# Patient Record
Sex: FEMALE | Race: WHITE | NOT HISPANIC OR LATINO | Employment: FULL TIME | URBAN - METROPOLITAN AREA
[De-identification: names, ages, dates, MRNs, and addresses within clinical notes are randomized per-mention and may not be internally consistent; named-entity substitution may affect disease eponyms.]

---

## 2017-03-20 DIAGNOSIS — R10.2 PELVIC AND PERINEAL PAIN: ICD-10-CM

## 2017-03-21 ENCOUNTER — APPOINTMENT (OUTPATIENT)
Dept: LAB | Facility: HOSPITAL | Age: 27
End: 2017-03-21
Payer: COMMERCIAL

## 2017-03-21 DIAGNOSIS — R10.2 PELVIC AND PERINEAL PAIN: ICD-10-CM

## 2017-03-21 LAB — B-HCG SERPL-ACNC: <2 MIU/ML

## 2017-03-21 PROCEDURE — 36415 COLL VENOUS BLD VENIPUNCTURE: CPT

## 2017-03-21 PROCEDURE — 84702 CHORIONIC GONADOTROPIN TEST: CPT

## 2017-03-25 ENCOUNTER — HOSPITAL ENCOUNTER (OUTPATIENT)
Dept: ULTRASOUND IMAGING | Facility: HOSPITAL | Age: 27
Discharge: HOME | End: 2017-03-25
Attending: FAMILY MEDICINE
Payer: COMMERCIAL

## 2017-03-25 DIAGNOSIS — R10.2 PELVIC AND PERINEAL PAIN: ICD-10-CM

## 2017-03-25 PROCEDURE — 76830 TRANSVAGINAL US NON-OB: CPT

## 2017-03-25 PROCEDURE — 76856 US EXAM PELVIC COMPLETE: CPT

## 2017-03-29 ENCOUNTER — ALLSCRIPTS OFFICE VISIT (OUTPATIENT)
Dept: OTHER | Facility: OTHER | Age: 27
End: 2017-03-29

## 2017-04-04 PROCEDURE — G0145 SCR C/V CYTO,THINLAYER,RESCR: HCPCS | Performed by: PHYSICIAN ASSISTANT

## 2017-04-06 ENCOUNTER — LAB REQUISITION (OUTPATIENT)
Dept: LAB | Facility: HOSPITAL | Age: 27
End: 2017-04-06
Payer: COMMERCIAL

## 2017-04-06 DIAGNOSIS — Z12.4 ENCOUNTER FOR SCREENING FOR MALIGNANT NEOPLASM OF CERVIX: ICD-10-CM

## 2017-04-11 LAB
LAB AP GYN PRIMARY INTERPRETATION: NORMAL
Lab: NORMAL

## 2017-04-28 ENCOUNTER — HOSPITAL ENCOUNTER (EMERGENCY)
Facility: HOSPITAL | Age: 27
Discharge: HOME | End: 2017-04-28
Attending: EMERGENCY MEDICINE
Payer: COMMERCIAL

## 2017-04-28 ENCOUNTER — APPOINTMENT (EMERGENCY)
Dept: RADIOLOGY | Facility: HOSPITAL | Age: 27
End: 2017-04-28
Payer: COMMERCIAL

## 2017-04-28 VITALS
HEART RATE: 84 BPM | OXYGEN SATURATION: 98 % | HEIGHT: 64 IN | BODY MASS INDEX: 23.94 KG/M2 | SYSTOLIC BLOOD PRESSURE: 126 MMHG | RESPIRATION RATE: 18 BRPM | TEMPERATURE: 98.6 F | WEIGHT: 140.21 LBS | DIASTOLIC BLOOD PRESSURE: 84 MMHG

## 2017-04-28 DIAGNOSIS — R10.33 COLICKY PERIUMBILICAL ABDOMINAL PAIN: Primary | ICD-10-CM

## 2017-04-28 DIAGNOSIS — R11.2 NAUSEA AND VOMITING: ICD-10-CM

## 2017-04-28 LAB
ALBUMIN SERPL BCP-MCNC: 3.7 G/DL (ref 3.5–5)
ALP SERPL-CCNC: 76 U/L (ref 46–116)
ALT SERPL W P-5'-P-CCNC: 34 U/L (ref 12–78)
ANION GAP SERPL CALCULATED.3IONS-SCNC: 7 MMOL/L (ref 4–13)
AST SERPL W P-5'-P-CCNC: 35 U/L (ref 5–45)
BACTERIA UR QL AUTO: ABNORMAL /HPF
BASOPHILS # BLD AUTO: 0.02 THOUSANDS/ΜL (ref 0–0.1)
BASOPHILS NFR BLD AUTO: 0 % (ref 0–1)
BILIRUB SERPL-MCNC: 0.4 MG/DL (ref 0.2–1)
BILIRUB UR QL STRIP: ABNORMAL
BUN SERPL-MCNC: 11 MG/DL (ref 5–25)
CALCIUM SERPL-MCNC: 8.9 MG/DL (ref 8.3–10.1)
CAOX CRY URNS QL MICRO: ABNORMAL /HPF
CHLORIDE SERPL-SCNC: 101 MMOL/L (ref 100–108)
CLARITY UR: ABNORMAL
CO2 SERPL-SCNC: 26 MMOL/L (ref 21–32)
COLOR UR: YELLOW
CREAT SERPL-MCNC: 0.84 MG/DL (ref 0.6–1.3)
EOSINOPHIL # BLD AUTO: 0.08 THOUSAND/ΜL (ref 0–0.61)
EOSINOPHIL NFR BLD AUTO: 1 % (ref 0–6)
ERYTHROCYTE [DISTWIDTH] IN BLOOD BY AUTOMATED COUNT: 12.7 % (ref 11.6–15.1)
GFR SERPL CREATININE-BSD FRML MDRD: >60 ML/MIN/1.73SQ M
GLUCOSE SERPL-MCNC: 109 MG/DL (ref 65–140)
GLUCOSE UR STRIP-MCNC: NEGATIVE MG/DL
HCG UR QL: NEGATIVE
HCT VFR BLD AUTO: 49 % (ref 34.8–46.1)
HGB BLD-MCNC: 16.3 G/DL (ref 11.5–15.4)
HGB UR QL STRIP.AUTO: NEGATIVE
KETONES UR STRIP-MCNC: ABNORMAL MG/DL
LEUKOCYTE ESTERASE UR QL STRIP: ABNORMAL
LIPASE SERPL-CCNC: 148 U/L (ref 73–393)
LYMPHOCYTES # BLD AUTO: 1.5 THOUSANDS/ΜL (ref 0.6–4.47)
LYMPHOCYTES NFR BLD AUTO: 26 % (ref 14–44)
MCH RBC QN AUTO: 31.1 PG (ref 26.8–34.3)
MCHC RBC AUTO-ENTMCNC: 33.3 G/DL (ref 31.4–37.4)
MCV RBC AUTO: 94 FL (ref 82–98)
MONOCYTES # BLD AUTO: 0.56 THOUSAND/ΜL (ref 0.17–1.22)
MONOCYTES NFR BLD AUTO: 10 % (ref 4–12)
MUCOUS THREADS UR QL AUTO: ABNORMAL
NEUTROPHILS # BLD AUTO: 3.54 THOUSANDS/ΜL (ref 1.85–7.62)
NEUTS SEG NFR BLD AUTO: 63 % (ref 43–75)
NITRITE UR QL STRIP: NEGATIVE
NON-SQ EPI CELLS URNS QL MICRO: ABNORMAL /HPF
PH UR STRIP.AUTO: 5.5 [PH] (ref 4.5–8)
PLATELET # BLD AUTO: 275 THOUSANDS/UL (ref 149–390)
PMV BLD AUTO: 10 FL (ref 8.9–12.7)
POTASSIUM SERPL-SCNC: 3.4 MMOL/L (ref 3.5–5.3)
PROT SERPL-MCNC: 7.6 G/DL (ref 6.4–8.2)
PROT UR STRIP-MCNC: ABNORMAL MG/DL
RBC # BLD AUTO: 5.24 MILLION/UL (ref 3.81–5.12)
RBC #/AREA URNS AUTO: ABNORMAL /HPF
SODIUM SERPL-SCNC: 134 MMOL/L (ref 136–145)
SP GR UR STRIP.AUTO: 1.02 (ref 1–1.03)
UROBILINOGEN UR QL STRIP.AUTO: 2 E.U./DL
WBC # BLD AUTO: 5.7 THOUSAND/UL (ref 4.31–10.16)
WBC #/AREA URNS AUTO: ABNORMAL /HPF

## 2017-04-28 PROCEDURE — 81025 URINE PREGNANCY TEST: CPT | Performed by: EMERGENCY MEDICINE

## 2017-04-28 PROCEDURE — 87147 CULTURE TYPE IMMUNOLOGIC: CPT | Performed by: EMERGENCY MEDICINE

## 2017-04-28 PROCEDURE — 36415 COLL VENOUS BLD VENIPUNCTURE: CPT | Performed by: EMERGENCY MEDICINE

## 2017-04-28 PROCEDURE — 96374 THER/PROPH/DIAG INJ IV PUSH: CPT

## 2017-04-28 PROCEDURE — 83690 ASSAY OF LIPASE: CPT | Performed by: EMERGENCY MEDICINE

## 2017-04-28 PROCEDURE — 74022 RADEX COMPL AQT ABD SERIES: CPT

## 2017-04-28 PROCEDURE — 80053 COMPREHEN METABOLIC PANEL: CPT | Performed by: EMERGENCY MEDICINE

## 2017-04-28 PROCEDURE — 96361 HYDRATE IV INFUSION ADD-ON: CPT

## 2017-04-28 PROCEDURE — 81001 URINALYSIS AUTO W/SCOPE: CPT | Performed by: EMERGENCY MEDICINE

## 2017-04-28 PROCEDURE — 87086 URINE CULTURE/COLONY COUNT: CPT | Performed by: EMERGENCY MEDICINE

## 2017-04-28 PROCEDURE — 99284 EMERGENCY DEPT VISIT MOD MDM: CPT

## 2017-04-28 PROCEDURE — 85025 COMPLETE CBC W/AUTO DIFF WBC: CPT | Performed by: EMERGENCY MEDICINE

## 2017-04-28 RX ORDER — TRAMADOL HYDROCHLORIDE 50 MG/1
50 TABLET ORAL EVERY 6 HOURS PRN
Qty: 30 TABLET | Refills: 0 | Status: SHIPPED | OUTPATIENT
Start: 2017-04-28 | End: 2017-05-08

## 2017-04-28 RX ORDER — MAGNESIUM HYDROXIDE/ALUMINUM HYDROXICE/SIMETHICONE 120; 1200; 1200 MG/30ML; MG/30ML; MG/30ML
15 SUSPENSION ORAL ONCE
Status: COMPLETED | OUTPATIENT
Start: 2017-04-28 | End: 2017-04-28

## 2017-04-28 RX ORDER — KETOROLAC TROMETHAMINE 30 MG/ML
30 INJECTION, SOLUTION INTRAMUSCULAR; INTRAVENOUS ONCE
Status: COMPLETED | OUTPATIENT
Start: 2017-04-28 | End: 2017-04-28

## 2017-04-28 RX ORDER — ONDANSETRON 4 MG/1
4 TABLET, FILM COATED ORAL EVERY 8 HOURS PRN
COMMUNITY
End: 2020-09-03

## 2017-04-28 RX ADMIN — LIDOCAINE HYDROCHLORIDE 10 ML: 20 SOLUTION ORAL; TOPICAL at 04:27

## 2017-04-28 RX ADMIN — KETOROLAC TROMETHAMINE 30 MG: 30 INJECTION, SOLUTION INTRAMUSCULAR at 04:04

## 2017-04-28 RX ADMIN — ALUMINUM HYDROXIDE, MAGNESIUM HYDROXIDE, AND SIMETHICONE 15 ML: 200; 200; 20 SUSPENSION ORAL at 04:27

## 2017-04-28 RX ADMIN — SODIUM CHLORIDE 1000 ML: 0.9 INJECTION, SOLUTION INTRAVENOUS at 04:50

## 2017-04-29 LAB
BACTERIA UR CULT: NORMAL
BACTERIA UR CULT: NORMAL

## 2017-06-21 ENCOUNTER — HOSPITAL ENCOUNTER (OUTPATIENT)
Dept: ULTRASOUND IMAGING | Facility: HOSPITAL | Age: 27
Discharge: HOME | End: 2017-06-21
Attending: FAMILY MEDICINE
Payer: COMMERCIAL

## 2017-06-21 DIAGNOSIS — M79.89 OTHER DISORDERS OF SOFT TISSUE: ICD-10-CM

## 2017-06-21 PROCEDURE — 93971 EXTREMITY STUDY: CPT

## 2017-07-27 ENCOUNTER — LAB REQUISITION (OUTPATIENT)
Dept: LAB | Facility: HOSPITAL | Age: 27
End: 2017-07-27
Payer: COMMERCIAL

## 2017-07-27 DIAGNOSIS — Z00.8 ENCOUNTER FOR OTHER GENERAL EXAMINATION: ICD-10-CM

## 2017-07-27 LAB
CHOLEST SERPL-MCNC: 184 MG/DL (ref 50–200)
EST. AVERAGE GLUCOSE BLD GHB EST-MCNC: 108 MG/DL
HBA1C MFR BLD: 5.4 % (ref 4.2–6.3)
HDLC SERPL-MCNC: 54 MG/DL (ref 40–60)
LDLC SERPL CALC-MCNC: 99 MG/DL (ref 0–100)
TRIGL SERPL-MCNC: 155 MG/DL

## 2017-07-27 PROCEDURE — 83036 HEMOGLOBIN GLYCOSYLATED A1C: CPT | Performed by: PREVENTIVE MEDICINE

## 2017-07-27 PROCEDURE — 80061 LIPID PANEL: CPT | Performed by: PREVENTIVE MEDICINE

## 2018-01-12 VITALS
HEART RATE: 74 BPM | HEIGHT: 64 IN | WEIGHT: 134 LBS | RESPIRATION RATE: 16 BRPM | BODY MASS INDEX: 22.88 KG/M2 | OXYGEN SATURATION: 98 % | TEMPERATURE: 96.9 F | DIASTOLIC BLOOD PRESSURE: 80 MMHG | SYSTOLIC BLOOD PRESSURE: 122 MMHG

## 2018-01-18 NOTE — PROGRESS NOTES
Chief Complaint  Pt needs vitals for employee health screening  Active Problems    1  Abscess (682 9) (L02 91)   2  Anxiety (300 00) (F41 9)   3  Cat allergies (477 8) (J30 81)   4  Depression (311) (F32 9)   5  Fatigue (780 79) (R53 83)   6  Hair loss (704 00) (L65 9)   7  Hematuria (599 70) (R31 9)   8  History of kidney stones (V13 01) (Z87 442)   9  Infection due to Lactococcus species (041 89) (B96 89)   10  Screening for condition (V82 9) (Z13 9)   11  Seasonal allergies (477 9) (J30 2)   12  Sinusitis (473 9) (J32 9)   13  Urine frequency (788 41) (R35 0)   14  URTI (acute upper respiratory infection) (465 9) (J06 9)    Current Meds   1  ALPRAZolam 0 5 MG Oral Tablet; TAKE 1 TABLET TWICE DAILY; Therapy: 70Apg9110 to (Evaluate:13Jmt0306); Last Rx:07Jun2016 Ordered   2  Amoxicillin-Pot Clavulanate 875-125 MG Oral Tablet; TAKE 1 TABLET EVERY 12   HOURS DAILY; Therapy: 84VJD4397 to ([facility])  Requested for: 01RNY4755; Last   Rx:89Prm2001 Ordered   3  Azithromycin 250 MG Oral Tablet; TAKE 2 TABLETS ON DAY 1 THEN TAKE 1 TABLET A   DAY FOR 4 DAYS; Therapy: 96IZW3889 to (Last Rx:31Mar2016)  Requested for: 21XGR3782 Ordered   4  Ciprofloxacin HCl - 500 MG Oral Tablet; TAKE 1 TABLET EVERY 12 HOURS DAILY; Therapy: 43Fol2369 to (Evaluate:34Nyx7714)  Requested for: 83Fya9975; Last   Rx:81Vtf8766 Ordered   5  Clindamycin HCl - 300 MG Oral Capsule; TAKE 1 CAPSULE EVERY 6 HOURS DAILY X   10 days; Therapy: 96HOG1996 to (Last CJ:93NEF0068)  Requested for: 34JKE6430 Ordered   6  Diazepam 5 MG Oral Tablet; Take 1 tablet daily; Therapy: 71Fvp4241 to (Evaluate:21Wju4610); Last Rx:41Mie1161 Ordered   7  Fluconazole 100 MG Oral Tablet; TAKE 1 TABLET BY MOUTH ONCE DAILY MAY   REPEAT IN 2 DAYS; Therapy: 28Lze9255 to (Evaluate:83Fvc1735)  Requested for: 12Guc1116; Last   Rx:94Yhe9018 Ordered   8  Fluticasone Propionate 50 MCG/ACT Nasal Suspension; USE 2 SPRAYS IN EACH   NOSTRIL ONCE DAILY;    Therapy: 09WXO2847 to ([facility])  Requested for: 99OPV9442; Last   Rx:10Nov2015 Ordered   9  MethylPREDNISolone (Simon) 4 MG TABS; TAKE AS DIRECTED; Therapy: 76XSQ7228 to (Last Rx:31Mar2016)  Requested for: 63OXG5999 Ordered   10  Ondansetron HCl - 4 MG Oral Tablet; take as directed; Therapy: 96PRS7842 to (Last Rx:07Jun2016)  Requested for: 26QCN7636 Ordered   11  Promethazine-Codeine 6 25-10 MG/5ML Oral Syrup; TAKE 5 ML EVERY 4 TO 6 HOURS    AS NEEDED FOR COUGH; Therapy: 34RVX0848 to (Evaluate:66Iwe2245); Last Rx:31Mar2016 Ordered   12  Proventil  (90 Base) MCG/ACT Inhalation Aerosol Solution; 2 PUFFS Q 4 HRS; Therapy: 06JQI5184 to (Last Rx:31Mar2016)  Requested for: 95ZFS3011 Ordered   13  Sertraline HCl - 25 MG Oral Tablet; Take 1 tablet daily; Therapy: 31Rcr3878 to (Evaluate:29Mtw1554)  Requested for: 47Zah7616; Last    Rx:04Apr2016 Ordered   14  Sertraline HCl - 50 MG Oral Tablet; TAKE 1 TABLET DAILY AS DIRECTED; Therapy: 85Ljj7977 to (Evaluate:45Xsm4712)  Requested for: 78Ngk2407; Last    Rx:04Apr2016 Ordered    Allergies    1  Bactrim TABS    Vitals  Signs    Systolic: 985  Diastolic: 78  Heart Rate: 75  Respiration: 16  O2 Saturation: 98  Height: 5 ft 4 in  Weight: 130 lb   BMI Calculated: 22 31  BSA Calculated: 1 63    Assessment    1  Encounter for preventive health examination (V70 0) (Z00 00)    Signatures   Electronically signed by : KATIA Corral;  Aug  8 2016  4:39PM EST                       (Author)    Electronically signed by : Ana Luisa Humphries MD; Sep 23 2016  9:15PM EST                       (Author)

## 2018-05-08 ENCOUNTER — LAB REQUISITION (OUTPATIENT)
Dept: LAB | Facility: HOSPITAL | Age: 28
End: 2018-05-08
Payer: COMMERCIAL

## 2018-05-08 DIAGNOSIS — Z12.4 ENCOUNTER FOR SCREENING FOR MALIGNANT NEOPLASM OF CERVIX: ICD-10-CM

## 2018-05-08 PROCEDURE — G0145 SCR C/V CYTO,THINLAYER,RESCR: HCPCS | Performed by: PHYSICIAN ASSISTANT

## 2018-05-11 LAB
LAB AP GYN PRIMARY INTERPRETATION: NORMAL
Lab: NORMAL

## 2018-06-12 DIAGNOSIS — J32.9 SINUSITIS, UNSPECIFIED CHRONICITY, UNSPECIFIED LOCATION: Primary | ICD-10-CM

## 2018-06-12 RX ORDER — AZITHROMYCIN 250 MG/1
TABLET, FILM COATED ORAL
Qty: 6 TABLET | Refills: 0 | Status: SHIPPED | OUTPATIENT
Start: 2018-06-12 | End: 2018-06-16

## 2018-08-18 ENCOUNTER — APPOINTMENT (OUTPATIENT)
Dept: LAB | Facility: HOSPITAL | Age: 28
End: 2018-08-18

## 2018-08-18 ENCOUNTER — TRANSCRIBE ORDERS (OUTPATIENT)
Dept: ADMINISTRATIVE | Facility: HOSPITAL | Age: 28
End: 2018-08-18

## 2018-08-18 DIAGNOSIS — Z00.8 HEALTH EXAMINATION IN POPULATION SURVEYS: Primary | ICD-10-CM

## 2018-08-18 DIAGNOSIS — Z00.8 HEALTH EXAMINATION IN POPULATION SURVEYS: ICD-10-CM

## 2018-08-18 LAB
CHOLEST SERPL-MCNC: 224 MG/DL (ref 50–200)
EST. AVERAGE GLUCOSE BLD GHB EST-MCNC: 111 MG/DL
HBA1C MFR BLD: 5.5 % (ref 4.2–6.3)
HDLC SERPL-MCNC: 63 MG/DL (ref 40–60)
LDLC SERPL CALC-MCNC: 135 MG/DL (ref 0–100)
NONHDLC SERPL-MCNC: 161 MG/DL
TRIGL SERPL-MCNC: 131 MG/DL

## 2018-08-18 PROCEDURE — 36415 COLL VENOUS BLD VENIPUNCTURE: CPT

## 2018-08-18 PROCEDURE — 83036 HEMOGLOBIN GLYCOSYLATED A1C: CPT

## 2018-08-18 PROCEDURE — 80061 LIPID PANEL: CPT

## 2019-06-27 ENCOUNTER — APPOINTMENT (OUTPATIENT)
Dept: LAB | Facility: HOSPITAL | Age: 29
End: 2019-06-27
Attending: FAMILY MEDICINE
Payer: COMMERCIAL

## 2019-06-27 ENCOUNTER — OFFICE VISIT (OUTPATIENT)
Dept: FAMILY MEDICINE CLINIC | Facility: CLINIC | Age: 29
End: 2019-06-27
Payer: COMMERCIAL

## 2019-06-27 ENCOUNTER — HOSPITAL ENCOUNTER (OUTPATIENT)
Dept: CT IMAGING | Facility: HOSPITAL | Age: 29
Discharge: HOME | End: 2019-06-27
Attending: FAMILY MEDICINE
Payer: COMMERCIAL

## 2019-06-27 VITALS
BODY MASS INDEX: 23.97 KG/M2 | RESPIRATION RATE: 14 BRPM | OXYGEN SATURATION: 98 % | DIASTOLIC BLOOD PRESSURE: 64 MMHG | HEART RATE: 95 BPM | SYSTOLIC BLOOD PRESSURE: 114 MMHG | HEIGHT: 64 IN | WEIGHT: 140.4 LBS

## 2019-06-27 DIAGNOSIS — R59.1 LAD (LYMPHADENOPATHY): ICD-10-CM

## 2019-06-27 DIAGNOSIS — R59.1 LAD (LYMPHADENOPATHY): Primary | ICD-10-CM

## 2019-06-27 LAB
ALBUMIN SERPL BCP-MCNC: 3.9 G/DL (ref 3.5–5)
ALP SERPL-CCNC: 103 U/L (ref 46–116)
ALT SERPL W P-5'-P-CCNC: 38 U/L (ref 12–78)
ANION GAP SERPL CALCULATED.3IONS-SCNC: 7 MMOL/L (ref 4–13)
AST SERPL W P-5'-P-CCNC: 20 U/L (ref 5–45)
BASOPHILS # BLD AUTO: 0.03 THOUSANDS/ΜL (ref 0–0.1)
BASOPHILS NFR BLD AUTO: 1 % (ref 0–1)
BILIRUB SERPL-MCNC: 0.4 MG/DL (ref 0.2–1)
BUN SERPL-MCNC: 10 MG/DL (ref 5–25)
CALCIUM SERPL-MCNC: 8.7 MG/DL (ref 8.3–10.1)
CHLORIDE SERPL-SCNC: 105 MMOL/L (ref 100–108)
CO2 SERPL-SCNC: 26 MMOL/L (ref 21–32)
CREAT SERPL-MCNC: 0.72 MG/DL (ref 0.6–1.3)
EOSINOPHIL # BLD AUTO: 0.06 THOUSAND/ΜL (ref 0–0.61)
EOSINOPHIL NFR BLD AUTO: 1 % (ref 0–6)
ERYTHROCYTE [DISTWIDTH] IN BLOOD BY AUTOMATED COUNT: 12 % (ref 11.6–15.1)
GFR SERPL CREATININE-BSD FRML MDRD: 114 ML/MIN/1.73SQ M
GLUCOSE P FAST SERPL-MCNC: 92 MG/DL (ref 65–99)
HCT VFR BLD AUTO: 42.5 % (ref 34.8–46.1)
HGB BLD-MCNC: 13.9 G/DL (ref 11.5–15.4)
IMM GRANULOCYTES # BLD AUTO: 0.07 THOUSAND/UL (ref 0–0.2)
IMM GRANULOCYTES NFR BLD AUTO: 1 % (ref 0–2)
LDH SERPL-CCNC: 171 U/L (ref 81–234)
LYMPHOCYTES # BLD AUTO: 1.13 THOUSANDS/ΜL (ref 0.6–4.47)
LYMPHOCYTES NFR BLD AUTO: 20 % (ref 14–44)
MCH RBC QN AUTO: 31 PG (ref 26.8–34.3)
MCHC RBC AUTO-ENTMCNC: 32.7 G/DL (ref 31.4–37.4)
MCV RBC AUTO: 95 FL (ref 82–98)
MONOCYTES # BLD AUTO: 0.36 THOUSAND/ΜL (ref 0.17–1.22)
MONOCYTES NFR BLD AUTO: 6 % (ref 4–12)
NEUTROPHILS # BLD AUTO: 4.05 THOUSANDS/ΜL (ref 1.85–7.62)
NEUTS SEG NFR BLD AUTO: 71 % (ref 43–75)
NRBC BLD AUTO-RTO: 0 /100 WBCS
PLATELET # BLD AUTO: 266 THOUSANDS/UL (ref 149–390)
PMV BLD AUTO: 9.3 FL (ref 8.9–12.7)
POTASSIUM SERPL-SCNC: 4.1 MMOL/L (ref 3.5–5.3)
PROT SERPL-MCNC: 7.4 G/DL (ref 6.4–8.2)
RBC # BLD AUTO: 4.48 MILLION/UL (ref 3.81–5.12)
SODIUM SERPL-SCNC: 138 MMOL/L (ref 136–145)
TSH SERPL DL<=0.05 MIU/L-ACNC: 1.56 UIU/ML (ref 0.36–3.74)
WBC # BLD AUTO: 5.7 THOUSAND/UL (ref 4.31–10.16)

## 2019-06-27 PROCEDURE — 88184 FLOWCYTOMETRY/ TC 1 MARKER: CPT

## 2019-06-27 PROCEDURE — 83615 LACTATE (LD) (LDH) ENZYME: CPT

## 2019-06-27 PROCEDURE — 86611 BARTONELLA ANTIBODY: CPT

## 2019-06-27 PROCEDURE — 3008F BODY MASS INDEX DOCD: CPT | Performed by: FAMILY MEDICINE

## 2019-06-27 PROCEDURE — 85025 COMPLETE CBC W/AUTO DIFF WBC: CPT

## 2019-06-27 PROCEDURE — 84443 ASSAY THYROID STIM HORMONE: CPT

## 2019-06-27 PROCEDURE — 88185 FLOWCYTOMETRY/TC ADD-ON: CPT

## 2019-06-27 PROCEDURE — 80053 COMPREHEN METABOLIC PANEL: CPT

## 2019-06-27 PROCEDURE — 1036F TOBACCO NON-USER: CPT | Performed by: FAMILY MEDICINE

## 2019-06-27 PROCEDURE — 99213 OFFICE O/P EST LOW 20 MIN: CPT | Performed by: FAMILY MEDICINE

## 2019-06-27 PROCEDURE — 36415 COLL VENOUS BLD VENIPUNCTURE: CPT

## 2019-06-27 PROCEDURE — 70491 CT SOFT TISSUE NECK W/DYE: CPT

## 2019-06-27 RX ORDER — AZITHROMYCIN 250 MG/1
500 TABLET, FILM COATED ORAL EVERY 24 HOURS
Qty: 10 TABLET | Refills: 0 | Status: SHIPPED | OUTPATIENT
Start: 2019-06-27 | End: 2019-07-02

## 2019-06-27 RX ADMIN — IOHEXOL 85 ML: 350 INJECTION, SOLUTION INTRAVENOUS at 17:33

## 2019-07-01 LAB
B HENSELAE IGG TITR SER IF: NEGATIVE TITER
B HENSELAE IGM TITR SER IF: NEGATIVE TITER
B QUINTANA IGG TITR SER IF: NEGATIVE TITER
B QUINTANA IGM TITR SER IF: NEGATIVE TITER

## 2019-07-11 LAB — SCAN RESULT: NORMAL

## 2019-09-05 ENCOUNTER — LAB REQUISITION (OUTPATIENT)
Dept: LAB | Facility: HOSPITAL | Age: 29
End: 2019-09-05
Payer: COMMERCIAL

## 2019-09-05 DIAGNOSIS — Z12.4 ENCOUNTER FOR SCREENING FOR MALIGNANT NEOPLASM OF CERVIX: ICD-10-CM

## 2019-09-05 PROCEDURE — G0145 SCR C/V CYTO,THINLAYER,RESCR: HCPCS | Performed by: PHYSICIAN ASSISTANT

## 2019-09-10 LAB
LAB AP GYN PRIMARY INTERPRETATION: NORMAL
Lab: NORMAL

## 2019-12-10 DIAGNOSIS — N97.9 INFERTILITY, FEMALE: Primary | ICD-10-CM

## 2019-12-17 ENCOUNTER — TRANSCRIBE ORDERS (OUTPATIENT)
Dept: ADMINISTRATIVE | Facility: HOSPITAL | Age: 29
End: 2019-12-17

## 2019-12-17 ENCOUNTER — APPOINTMENT (OUTPATIENT)
Dept: LAB | Facility: HOSPITAL | Age: 29
End: 2019-12-17
Payer: COMMERCIAL

## 2019-12-17 DIAGNOSIS — D64.9 ANEMIA, UNSPECIFIED TYPE: ICD-10-CM

## 2019-12-17 DIAGNOSIS — Z31.41 FERTILITY TESTING: ICD-10-CM

## 2019-12-17 DIAGNOSIS — E66.8 OBESITY OF ENDOCRINE ORIGIN: ICD-10-CM

## 2019-12-17 DIAGNOSIS — R76.0 RAISED ANTIBODY TITER: ICD-10-CM

## 2019-12-17 DIAGNOSIS — Z22.4 CARRIER OR SUSPECTED CARRIER OF GONORRHEA: ICD-10-CM

## 2019-12-17 DIAGNOSIS — Z11.8 SCREENING EXAMINATION FOR TRACHOMA: ICD-10-CM

## 2019-12-17 DIAGNOSIS — E07.9 DISEASE OF THYROID GLAND: ICD-10-CM

## 2019-12-17 DIAGNOSIS — Z11.3 SCREENING EXAMINATION FOR VENEREAL DISEASE: ICD-10-CM

## 2019-12-17 DIAGNOSIS — Z01.83 ENCOUNTER FOR BLOOD TYPING: Primary | ICD-10-CM

## 2019-12-17 DIAGNOSIS — Z01.83 ENCOUNTER FOR BLOOD TYPING: ICD-10-CM

## 2019-12-17 LAB
ABO GROUP BLD: NORMAL
BASOPHILS # BLD AUTO: 0.06 THOUSANDS/ΜL (ref 0–0.1)
BASOPHILS NFR BLD AUTO: 1 % (ref 0–1)
BLD GP AB SCN SERPL QL: NEGATIVE
EOSINOPHIL # BLD AUTO: 0.13 THOUSAND/ΜL (ref 0–0.61)
EOSINOPHIL NFR BLD AUTO: 2 % (ref 0–6)
ERYTHROCYTE [DISTWIDTH] IN BLOOD BY AUTOMATED COUNT: 12.3 % (ref 11.6–15.1)
HBV CORE IGM SER QL: NORMAL
HBV SURFACE AG SER QL: NORMAL
HCT VFR BLD AUTO: 44.1 % (ref 34.8–46.1)
HCV AB SER QL: NORMAL
HGB BLD-MCNC: 14.5 G/DL (ref 11.5–15.4)
IMM GRANULOCYTES # BLD AUTO: 0.02 THOUSAND/UL (ref 0–0.2)
IMM GRANULOCYTES NFR BLD AUTO: 0 % (ref 0–2)
LYMPHOCYTES # BLD AUTO: 2.43 THOUSANDS/ΜL (ref 0.6–4.47)
LYMPHOCYTES NFR BLD AUTO: 39 % (ref 14–44)
MCH RBC QN AUTO: 31.3 PG (ref 26.8–34.3)
MCHC RBC AUTO-ENTMCNC: 32.9 G/DL (ref 31.4–37.4)
MCV RBC AUTO: 95 FL (ref 82–98)
MONOCYTES # BLD AUTO: 0.39 THOUSAND/ΜL (ref 0.17–1.22)
MONOCYTES NFR BLD AUTO: 6 % (ref 4–12)
NEUTROPHILS # BLD AUTO: 3.16 THOUSANDS/ΜL (ref 1.85–7.62)
NEUTS SEG NFR BLD AUTO: 52 % (ref 43–75)
NRBC BLD AUTO-RTO: 0 /100 WBCS
PLATELET # BLD AUTO: 307 THOUSANDS/UL (ref 149–390)
PMV BLD AUTO: 9.2 FL (ref 8.9–12.7)
PROLACTIN SERPL-MCNC: 10.2 NG/ML
RBC # BLD AUTO: 4.63 MILLION/UL (ref 3.81–5.12)
RH BLD: POSITIVE
RUBV IGG SERPL IA-ACNC: >175 IU/ML
SPECIMEN EXPIRATION DATE: NORMAL
T3FREE SERPL-MCNC: 3.43 PG/ML (ref 2.3–4.2)
T4 FREE SERPL-MCNC: 1.01 NG/DL (ref 0.76–1.46)
TSH SERPL DL<=0.05 MIU/L-ACNC: 1.74 UIU/ML (ref 0.36–3.74)
WBC # BLD AUTO: 6.19 THOUSAND/UL (ref 4.31–10.16)

## 2019-12-17 PROCEDURE — 86645 CMV ANTIBODY IGM: CPT

## 2019-12-17 PROCEDURE — 84481 FREE ASSAY (FT-3): CPT

## 2019-12-17 PROCEDURE — 86787 VARICELLA-ZOSTER ANTIBODY: CPT

## 2019-12-17 PROCEDURE — 80081 OBSTETRIC PANEL INC HIV TSTG: CPT

## 2019-12-17 PROCEDURE — 83516 IMMUNOASSAY NONANTIBODY: CPT

## 2019-12-17 PROCEDURE — 86705 HEP B CORE ANTIBODY IGM: CPT

## 2019-12-17 PROCEDURE — 36415 COLL VENOUS BLD VENIPUNCTURE: CPT

## 2019-12-17 PROCEDURE — 84443 ASSAY THYROID STIM HORMONE: CPT

## 2019-12-17 PROCEDURE — 84439 ASSAY OF FREE THYROXINE: CPT

## 2019-12-17 PROCEDURE — 87491 CHLMYD TRACH DNA AMP PROBE: CPT

## 2019-12-17 PROCEDURE — 86790 VIRUS ANTIBODY NOS: CPT

## 2019-12-17 PROCEDURE — 87661 TRICHOMONAS VAGINALIS AMPLIF: CPT

## 2019-12-17 PROCEDURE — 87591 N.GONORRHOEAE DNA AMP PROB: CPT

## 2019-12-17 PROCEDURE — 86632 CHLAMYDIA IGM ANTIBODY: CPT

## 2019-12-17 PROCEDURE — 86644 CMV ANTIBODY: CPT

## 2019-12-17 PROCEDURE — 86803 HEPATITIS C AB TEST: CPT

## 2019-12-17 PROCEDURE — 84146 ASSAY OF PROLACTIN: CPT

## 2019-12-18 LAB
C TRACH DNA SPEC QL NAA+PROBE: NEGATIVE
CMV IGG SERPL IA-ACNC: <0.6 U/ML (ref 0–0.59)
CMV IGM SERPL IA-ACNC: <30 AU/ML (ref 0–29.9)
HIV 1+2 AB+HIV1 P24 AG SERPL QL IA: NORMAL
N GONORRHOEA DNA SPEC QL NAA+PROBE: NEGATIVE
RPR SER QL: NORMAL

## 2019-12-19 LAB
C TRACH IGM TITR SER IF: <0.8 INDEX (ref 0–0.7)
HTLV I+II AB SER QL IA: NEGATIVE
VZV IGG SER IA-ACNC: NORMAL

## 2019-12-20 LAB — T VAGINALIS RRNA SPEC QL NAA+PROBE: NEGATIVE

## 2019-12-21 LAB — MIS SERPL-MCNC: 1.29 NG/ML

## 2020-01-08 ENCOUNTER — APPOINTMENT (OUTPATIENT)
Dept: LAB | Facility: HOSPITAL | Age: 30
End: 2020-01-08
Payer: COMMERCIAL

## 2020-01-08 ENCOUNTER — TRANSCRIBE ORDERS (OUTPATIENT)
Dept: ADMINISTRATIVE | Facility: HOSPITAL | Age: 30
End: 2020-01-08

## 2020-01-08 DIAGNOSIS — Z11.59 SPECIAL SCREENING EXAMINATION FOR VIRAL AND CHLAMYDIAL DISEASE: Primary | ICD-10-CM

## 2020-01-08 DIAGNOSIS — Z11.59 SCREENING FOR VIRAL AND CHLAMYDIAL DISEASES: ICD-10-CM

## 2020-01-08 DIAGNOSIS — Z11.8 SPECIAL SCREENING EXAMINATION FOR VIRAL AND CHLAMYDIAL DISEASE: ICD-10-CM

## 2020-01-08 DIAGNOSIS — Z11.8 SPECIAL SCREENING EXAMINATION FOR VIRAL AND CHLAMYDIAL DISEASE: Primary | ICD-10-CM

## 2020-01-08 DIAGNOSIS — Z11.8 SCREENING FOR VIRAL AND CHLAMYDIAL DISEASES: ICD-10-CM

## 2020-01-08 DIAGNOSIS — Z11.59 SPECIAL SCREENING EXAMINATION FOR VIRAL AND CHLAMYDIAL DISEASE: ICD-10-CM

## 2020-01-08 LAB — HBV CORE AB SER QL: NORMAL

## 2020-01-08 PROCEDURE — 86704 HEP B CORE ANTIBODY TOTAL: CPT

## 2020-01-08 PROCEDURE — 86631 CHLAMYDIA ANTIBODY: CPT

## 2020-01-08 PROCEDURE — 36415 COLL VENOUS BLD VENIPUNCTURE: CPT

## 2020-01-08 PROCEDURE — 86632 CHLAMYDIA IGM ANTIBODY: CPT

## 2020-01-10 LAB
C PNEUM IGA TITR SER IF: NORMAL {TITER}
C PNEUM IGA+IGG+IGM SER-IMP: NORMAL
C PNEUM IGG TITR SER IF: NORMAL {TITER}
C PNEUM IGM TITR SER IF: NORMAL {TITER}

## 2020-01-21 PROCEDURE — 88305 TISSUE EXAM BY PATHOLOGIST: CPT | Performed by: PATHOLOGY

## 2020-01-21 PROCEDURE — 88342 IMHCHEM/IMCYTCHM 1ST ANTB: CPT | Performed by: PATHOLOGY

## 2020-01-21 PROCEDURE — 86632 CHLAMYDIA IGM ANTIBODY: CPT | Performed by: OBSTETRICS & GYNECOLOGY

## 2020-01-21 PROCEDURE — 86631 CHLAMYDIA ANTIBODY: CPT | Performed by: OBSTETRICS & GYNECOLOGY

## 2020-01-22 ENCOUNTER — LAB REQUISITION (OUTPATIENT)
Dept: LAB | Facility: HOSPITAL | Age: 30
End: 2020-01-22
Payer: COMMERCIAL

## 2020-01-22 DIAGNOSIS — Z11.8 ENCOUNTER FOR SCREENING FOR OTHER INFECTIOUS AND PARASITIC DISEASES: ICD-10-CM

## 2020-01-22 DIAGNOSIS — N71.1 CHRONIC INFLAMMATORY DISEASE OF UTERUS: ICD-10-CM

## 2020-01-24 LAB
C TRACH IGM TITR SER IF: <0.8 INDEX (ref 0–0.7)
CHLAMYDIA IGG SER-ACNC: <0.91 RATIO (ref 0–0.9)

## 2020-02-12 ENCOUNTER — TRANSCRIBE ORDERS (OUTPATIENT)
Dept: LAB | Facility: HOSPITAL | Age: 30
End: 2020-02-12

## 2020-09-03 ENCOUNTER — TELEMEDICINE (OUTPATIENT)
Dept: FAMILY MEDICINE CLINIC | Facility: CLINIC | Age: 30
End: 2020-09-03
Payer: COMMERCIAL

## 2020-09-03 DIAGNOSIS — F41.9 ANXIETY: Primary | ICD-10-CM

## 2020-09-03 PROCEDURE — 99442 PR PHYS/QHP TELEPHONE EVALUATION 11-20 MIN: CPT | Performed by: FAMILY MEDICINE

## 2020-09-03 RX ORDER — BUSPIRONE HYDROCHLORIDE 5 MG/1
5 TABLET ORAL 2 TIMES DAILY
Qty: 60 TABLET | Refills: 5 | Status: SHIPPED | OUTPATIENT
Start: 2020-09-03 | End: 2021-05-18

## 2020-09-03 NOTE — PROGRESS NOTES
It was my intent to perform this visit via video technology but the patient was not able to do a video connection so the visit was completed via audio telephone only  Virtual Brief Visit    Assessment/Plan:    Problem List Items Addressed This Visit        Other    Anxiety - Primary    Relevant Medications    busPIRone (BUSPAR) 5 mg tablet                Reason for visit is No chief complaint on file  Encounter provider Anirudh Walker MD    Provider located at [address] [place] [address]    Recent Visits  No visits were found meeting these conditions  Showing recent visits within past 7 days and meeting all other requirements     Today's Visits  Date Type Provider Dept   09/03/20 Telemedicine MD Rell Orozco   Showing today's visits and meeting all other requirements     Future Appointments  No visits were found meeting these conditions  Showing future appointments within next 150 days and meeting all other requirements        After connecting through telephone, the patient was identified by name and date of birth  Momo Walker was informed that this is a telemedicine visit and that the visit is being conducted through telephone  My office door was closed  No one else was in the room  She acknowledged consent and understanding of privacy and security of the platform  The patient has agreed to participate and understands she can discontinue the visit at any time  Patient is aware this is a billable service  Subjective    Momo Walker is a 35 y o  female   The patient is experiencing significant anxiety and depression  She is having a difficult time functioning at work due to the inability to concentrate secondary to her symptoms  She has no homicidal or suicidal ideation  She would like to avoid sedating medications such as benzodiazepines  There is significant psychosocial factors contributing to her symptoms    These are situational but significant  I do not expect them to last much greater than 6 months, however  In the meantime, she would benefit from pharmacotherapy and some time away from work  No past medical history on file  Past Surgical History:   Procedure Laterality Date    KIDNEY STONE SURGERY      TONSILLECTOMY      WISDOM TOOTH EXTRACTION         Current Outpatient Medications   Medication Sig Dispense Refill    busPIRone (BUSPAR) 5 mg tablet Take 1 tablet (5 mg total) by mouth 2 (two) times a day 60 tablet 5     No current facility-administered medications for this visit  Allergies   Allergen Reactions    Sulfamethoxazole-Trimethoprim        Review of Systems   Psychiatric/Behavioral: The patient is nervous/anxious  There were no vitals filed for this visit  I spent 15 minutes directly with the patient during this visit    VIRTUAL VISIT DISCLAIMER    Melinda Cabrera acknowledges that she has consented to an online visit or consultation  She understands that the online visit is based solely on information provided by her, and that, in the absence of a face-to-face physical evaluation by the physician, the diagnosis she receives is both limited and provisional in terms of accuracy and completeness  This is not intended to replace a full medical face-to-face evaluation by the physician  Melinda Cabrera understands and accepts these terms

## 2020-09-24 ENCOUNTER — APPOINTMENT (OUTPATIENT)
Dept: LAB | Facility: HOSPITAL | Age: 30
End: 2020-09-24
Payer: COMMERCIAL

## 2020-09-24 ENCOUNTER — TRANSCRIBE ORDERS (OUTPATIENT)
Dept: ADMINISTRATIVE | Facility: HOSPITAL | Age: 30
End: 2020-09-24

## 2020-09-24 DIAGNOSIS — Z12.4 SCREENING FOR MALIGNANT NEOPLASM OF THE CERVIX: Primary | ICD-10-CM

## 2020-09-24 DIAGNOSIS — Z12.4 SCREENING FOR MALIGNANT NEOPLASM OF THE CERVIX: ICD-10-CM

## 2020-09-24 PROCEDURE — G0145 SCR C/V CYTO,THINLAYER,RESCR: HCPCS

## 2020-09-30 LAB
LAB AP GYN PRIMARY INTERPRETATION: NORMAL
Lab: NORMAL

## 2020-11-16 ENCOUNTER — OFFICE VISIT (OUTPATIENT)
Dept: FAMILY MEDICINE CLINIC | Facility: CLINIC | Age: 30
End: 2020-11-16
Payer: COMMERCIAL

## 2020-11-16 DIAGNOSIS — F41.9 ANXIETY: Primary | ICD-10-CM

## 2020-11-16 PROCEDURE — 99212 OFFICE O/P EST SF 10 MIN: CPT | Performed by: FAMILY MEDICINE

## 2020-11-30 ENCOUNTER — TELEPHONE (OUTPATIENT)
Dept: FAMILY MEDICINE CLINIC | Facility: CLINIC | Age: 30
End: 2020-11-30

## 2020-12-31 ENCOUNTER — LAB REQUISITION (OUTPATIENT)
Dept: LAB | Facility: HOSPITAL | Age: 30
End: 2020-12-31
Payer: COMMERCIAL

## 2020-12-31 DIAGNOSIS — Z31.41 ENCOUNTER FOR FERTILITY TESTING: ICD-10-CM

## 2020-12-31 LAB
BASOPHILS # BLD AUTO: 0.04 THOUSANDS/ΜL (ref 0–0.1)
BASOPHILS NFR BLD AUTO: 1 % (ref 0–1)
EOSINOPHIL # BLD AUTO: 0.09 THOUSAND/ΜL (ref 0–0.61)
EOSINOPHIL NFR BLD AUTO: 2 % (ref 0–6)
ERYTHROCYTE [DISTWIDTH] IN BLOOD BY AUTOMATED COUNT: 12.6 % (ref 11.6–15.1)
HCT VFR BLD AUTO: 46.1 % (ref 34.8–46.1)
HGB BLD-MCNC: 14.7 G/DL (ref 11.5–15.4)
IMM GRANULOCYTES # BLD AUTO: 0.01 THOUSAND/UL (ref 0–0.2)
IMM GRANULOCYTES NFR BLD AUTO: 0 % (ref 0–2)
LYMPHOCYTES # BLD AUTO: 2.1 THOUSANDS/ΜL (ref 0.6–4.47)
LYMPHOCYTES NFR BLD AUTO: 35 % (ref 14–44)
MCH RBC QN AUTO: 30.7 PG (ref 26.8–34.3)
MCHC RBC AUTO-ENTMCNC: 31.9 G/DL (ref 31.4–37.4)
MCV RBC AUTO: 96 FL (ref 82–98)
MONOCYTES # BLD AUTO: 0.36 THOUSAND/ΜL (ref 0.17–1.22)
MONOCYTES NFR BLD AUTO: 6 % (ref 4–12)
NEUTROPHILS # BLD AUTO: 3.39 THOUSANDS/ΜL (ref 1.85–7.62)
NEUTS SEG NFR BLD AUTO: 56 % (ref 43–75)
NRBC BLD AUTO-RTO: 0 /100 WBCS
PLATELET # BLD AUTO: 277 THOUSANDS/UL (ref 149–390)
PMV BLD AUTO: 9.8 FL (ref 8.9–12.7)
RBC # BLD AUTO: 4.79 MILLION/UL (ref 3.81–5.12)
WBC # BLD AUTO: 5.99 THOUSAND/UL (ref 4.31–10.16)

## 2020-12-31 PROCEDURE — 85025 COMPLETE CBC W/AUTO DIFF WBC: CPT | Performed by: OBSTETRICS & GYNECOLOGY

## 2020-12-31 PROCEDURE — 83516 IMMUNOASSAY NONANTIBODY: CPT | Performed by: OBSTETRICS & GYNECOLOGY

## 2021-01-06 LAB — MIS SERPL-MCNC: 1.52 NG/ML

## 2021-01-16 DIAGNOSIS — R50.9 FEVER AND CHILLS: Primary | ICD-10-CM

## 2021-01-18 DIAGNOSIS — R50.9 FEVER AND CHILLS: ICD-10-CM

## 2021-01-18 PROCEDURE — U0003 INFECTIOUS AGENT DETECTION BY NUCLEIC ACID (DNA OR RNA); SEVERE ACUTE RESPIRATORY SYNDROME CORONAVIRUS 2 (SARS-COV-2) (CORONAVIRUS DISEASE [COVID-19]), AMPLIFIED PROBE TECHNIQUE, MAKING USE OF HIGH THROUGHPUT TECHNOLOGIES AS DESCRIBED BY CMS-2020-01-R: HCPCS

## 2021-01-18 PROCEDURE — U0005 INFEC AGEN DETEC AMPLI PROBE: HCPCS

## 2021-01-19 LAB — SARS-COV-2 RNA RESP QL NAA+PROBE: NEGATIVE

## 2021-03-02 ENCOUNTER — LAB REQUISITION (OUTPATIENT)
Dept: LAB | Facility: HOSPITAL | Age: 31
End: 2021-03-02
Payer: COMMERCIAL

## 2021-03-02 DIAGNOSIS — Z13.228 ENCOUNTER FOR SCREENING FOR OTHER METABOLIC DISORDERS: ICD-10-CM

## 2021-03-02 LAB
BASOPHILS # BLD AUTO: 0.05 THOUSANDS/ΜL (ref 0–0.1)
BASOPHILS NFR BLD AUTO: 1 % (ref 0–1)
EOSINOPHIL # BLD AUTO: 0.07 THOUSAND/ΜL (ref 0–0.61)
EOSINOPHIL NFR BLD AUTO: 1 % (ref 0–6)
ERYTHROCYTE [DISTWIDTH] IN BLOOD BY AUTOMATED COUNT: 12.2 % (ref 11.6–15.1)
HCT VFR BLD AUTO: 45.1 % (ref 34.8–46.1)
HGB BLD-MCNC: 14.7 G/DL (ref 11.5–15.4)
IMM GRANULOCYTES # BLD AUTO: 0.02 THOUSAND/UL (ref 0–0.2)
IMM GRANULOCYTES NFR BLD AUTO: 0 % (ref 0–2)
LYMPHOCYTES # BLD AUTO: 2 THOUSANDS/ΜL (ref 0.6–4.47)
LYMPHOCYTES NFR BLD AUTO: 34 % (ref 14–44)
MCH RBC QN AUTO: 31.1 PG (ref 26.8–34.3)
MCHC RBC AUTO-ENTMCNC: 32.6 G/DL (ref 31.4–37.4)
MCV RBC AUTO: 96 FL (ref 82–98)
MONOCYTES # BLD AUTO: 0.43 THOUSAND/ΜL (ref 0.17–1.22)
MONOCYTES NFR BLD AUTO: 7 % (ref 4–12)
NEUTROPHILS # BLD AUTO: 3.37 THOUSANDS/ΜL (ref 1.85–7.62)
NEUTS SEG NFR BLD AUTO: 57 % (ref 43–75)
NRBC BLD AUTO-RTO: 0 /100 WBCS
PLATELET # BLD AUTO: 301 THOUSANDS/UL (ref 149–390)
PMV BLD AUTO: 10.3 FL (ref 8.9–12.7)
RBC # BLD AUTO: 4.72 MILLION/UL (ref 3.81–5.12)
WBC # BLD AUTO: 5.94 THOUSAND/UL (ref 4.31–10.16)

## 2021-03-02 PROCEDURE — 85025 COMPLETE CBC W/AUTO DIFF WBC: CPT | Performed by: OBSTETRICS & GYNECOLOGY

## 2021-04-27 ENCOUNTER — LAB REQUISITION (OUTPATIENT)
Dept: LAB | Facility: HOSPITAL | Age: 31
End: 2021-04-27
Payer: COMMERCIAL

## 2021-04-27 DIAGNOSIS — Z13.228 ENCOUNTER FOR SCREENING FOR OTHER METABOLIC DISORDERS: ICD-10-CM

## 2021-04-27 DIAGNOSIS — E28.9 OVARIAN DYSFUNCTION, UNSPECIFIED: ICD-10-CM

## 2021-04-27 LAB
BASOPHILS # BLD AUTO: 0.04 THOUSANDS/ΜL (ref 0–0.1)
BASOPHILS NFR BLD AUTO: 1 % (ref 0–1)
EOSINOPHIL # BLD AUTO: 0.09 THOUSAND/ΜL (ref 0–0.61)
EOSINOPHIL NFR BLD AUTO: 2 % (ref 0–6)
ERYTHROCYTE [DISTWIDTH] IN BLOOD BY AUTOMATED COUNT: 12.7 % (ref 11.6–15.1)
HCT VFR BLD AUTO: 45 % (ref 34.8–46.1)
HGB BLD-MCNC: 14.4 G/DL (ref 11.5–15.4)
IMM GRANULOCYTES # BLD AUTO: 0.01 THOUSAND/UL (ref 0–0.2)
IMM GRANULOCYTES NFR BLD AUTO: 0 % (ref 0–2)
LYMPHOCYTES # BLD AUTO: 1.55 THOUSANDS/ΜL (ref 0.6–4.47)
LYMPHOCYTES NFR BLD AUTO: 33 % (ref 14–44)
MCH RBC QN AUTO: 31.3 PG (ref 26.8–34.3)
MCHC RBC AUTO-ENTMCNC: 32 G/DL (ref 31.4–37.4)
MCV RBC AUTO: 98 FL (ref 82–98)
MONOCYTES # BLD AUTO: 0.32 THOUSAND/ΜL (ref 0.17–1.22)
MONOCYTES NFR BLD AUTO: 7 % (ref 4–12)
NEUTROPHILS # BLD AUTO: 2.68 THOUSANDS/ΜL (ref 1.85–7.62)
NEUTS SEG NFR BLD AUTO: 57 % (ref 43–75)
NRBC BLD AUTO-RTO: 0 /100 WBCS
PLATELET # BLD AUTO: 286 THOUSANDS/UL (ref 149–390)
PMV BLD AUTO: 10.4 FL (ref 8.9–12.7)
RBC # BLD AUTO: 4.6 MILLION/UL (ref 3.81–5.12)
WBC # BLD AUTO: 4.69 THOUSAND/UL (ref 4.31–10.16)

## 2021-04-27 PROCEDURE — 85025 COMPLETE CBC W/AUTO DIFF WBC: CPT | Performed by: OBSTETRICS & GYNECOLOGY

## 2021-05-01 ENCOUNTER — APPOINTMENT (OUTPATIENT)
Dept: LAB | Facility: MEDICAL CENTER | Age: 31
End: 2021-05-01

## 2021-05-01 ENCOUNTER — TRANSCRIBE ORDERS (OUTPATIENT)
Dept: LAB | Facility: MEDICAL CENTER | Age: 31
End: 2021-05-01

## 2021-05-01 DIAGNOSIS — Z00.8 ENCOUNTER FOR OTHER GENERAL EXAMINATION: ICD-10-CM

## 2021-05-01 DIAGNOSIS — Z00.8 ENCOUNTER FOR OTHER GENERAL EXAMINATION: Primary | ICD-10-CM

## 2021-05-01 LAB
CHOLEST SERPL-MCNC: 171 MG/DL (ref 50–200)
EST. AVERAGE GLUCOSE BLD GHB EST-MCNC: 100 MG/DL
HBA1C MFR BLD: 5.1 %
HDLC SERPL-MCNC: 54 MG/DL
LDLC SERPL CALC-MCNC: 91 MG/DL (ref 0–100)
NONHDLC SERPL-MCNC: 117 MG/DL
TRIGL SERPL-MCNC: 130 MG/DL

## 2021-05-01 PROCEDURE — 36415 COLL VENOUS BLD VENIPUNCTURE: CPT

## 2021-05-01 PROCEDURE — 80061 LIPID PANEL: CPT

## 2021-05-01 PROCEDURE — 83036 HEMOGLOBIN GLYCOSYLATED A1C: CPT

## 2021-05-18 ENCOUNTER — OFFICE VISIT (OUTPATIENT)
Dept: FAMILY MEDICINE CLINIC | Facility: CLINIC | Age: 31
End: 2021-05-18
Payer: COMMERCIAL

## 2021-05-18 VITALS
WEIGHT: 136 LBS | TEMPERATURE: 98.3 F | HEART RATE: 75 BPM | DIASTOLIC BLOOD PRESSURE: 68 MMHG | BODY MASS INDEX: 23.22 KG/M2 | OXYGEN SATURATION: 98 % | SYSTOLIC BLOOD PRESSURE: 106 MMHG | HEIGHT: 64 IN

## 2021-05-18 DIAGNOSIS — Z00.00 WELL ADULT EXAM: Primary | ICD-10-CM

## 2021-05-18 PROCEDURE — 99395 PREV VISIT EST AGE 18-39: CPT | Performed by: FAMILY MEDICINE

## 2021-05-18 RX ORDER — AZITHROMYCIN 250 MG/1
TABLET, FILM COATED ORAL
COMMUNITY
Start: 2021-03-03 | End: 2021-05-18

## 2021-05-18 RX ORDER — ESTRADIOL 2 MG/1
TABLET ORAL
COMMUNITY
Start: 2021-03-12 | End: 2021-08-24 | Stop reason: ALTCHOICE

## 2021-05-18 RX ORDER — PROGESTERONE 50 MG/ML
INJECTION, SOLUTION INTRAMUSCULAR
COMMUNITY
Start: 2021-03-12 | End: 2021-08-24 | Stop reason: ALTCHOICE

## 2021-05-18 RX ORDER — LEUPROLIDE ACETATE 1 MG/0.2ML
KIT SUBCUTANEOUS
COMMUNITY
Start: 2021-03-12 | End: 2021-08-24 | Stop reason: ALTCHOICE

## 2021-05-18 RX ORDER — PEN NEEDLE, DIABETIC 29 G X1/2"
NEEDLE, DISPOSABLE MISCELLANEOUS
COMMUNITY
Start: 2021-03-12 | End: 2021-08-24 | Stop reason: ALTCHOICE

## 2021-05-18 RX ORDER — SYRINGE WITH NEEDLE, 1 ML 25GX5/8"
SYRINGE, EMPTY DISPOSABLE MISCELLANEOUS
COMMUNITY
Start: 2021-03-12 | End: 2021-08-24 | Stop reason: ALTCHOICE

## 2021-05-18 RX ORDER — SYRINGE WITH NEEDLE, 1 ML 28GX1/2"
SYRINGE, EMPTY DISPOSABLE MISCELLANEOUS
COMMUNITY
Start: 2021-03-12 | End: 2021-08-24 | Stop reason: ALTCHOICE

## 2021-05-18 RX ORDER — NEEDLES, DISPOSABLE 25GX5/8"
NEEDLE, DISPOSABLE MISCELLANEOUS
COMMUNITY
Start: 2021-03-12 | End: 2021-09-07

## 2021-05-18 RX ORDER — CETRORELIX ACETATE 0.25 MG
KIT SUBCUTANEOUS
COMMUNITY
Start: 2021-03-29 | End: 2021-08-24 | Stop reason: ALTCHOICE

## 2021-05-18 RX ORDER — CHORIOGONADOTROPIN ALFA 10000 UNIT
KIT INTRAMUSCULAR
COMMUNITY
Start: 2021-03-12 | End: 2021-08-24 | Stop reason: ALTCHOICE

## 2021-05-18 RX ORDER — DIAZEPAM 5 MG/1
TABLET ORAL
COMMUNITY
Start: 2021-03-03 | End: 2021-05-18

## 2021-05-18 RX ORDER — DESOGESTREL AND ETHINYL ESTRADIOL 0.15-0.03
1 KIT ORAL DAILY
COMMUNITY
Start: 2021-03-01 | End: 2021-08-24 | Stop reason: ALTCHOICE

## 2021-05-18 RX ORDER — FOLIC ACID 1 MG/1
TABLET ORAL
COMMUNITY
End: 2021-08-24 | Stop reason: ALTCHOICE

## 2021-05-18 RX ORDER — NEEDLES, DISPOSABLE 25GX5/8"
NEEDLE, DISPOSABLE MISCELLANEOUS
COMMUNITY
Start: 2021-03-12 | End: 2021-08-24 | Stop reason: ALTCHOICE

## 2021-05-18 NOTE — PROGRESS NOTES
Assessment/Plan     Healthy female exam        2  Patient Counseling:  --Nutrition: Stressed importance of moderation in sodium/caffeine intake, saturated fat and cholesterol, caloric balance, sufficient intake of fresh fruits, vegetables, fiber, calcium, iron, and 1 mg of folate supplement per day (for females capable of pregnancy)  --Discussed the issue of estrogen replacement, calcium supplement, and the daily use of baby aspirin  --Exercise: Stressed the importance of regular exercise  --Substance Abuse: Discussed cessation/primary prevention of tobacco, alcohol, or other drug use; driving or other dangerous activities under the influence; availability of treatment for abuse  --Sexuality: Discussed sexually transmitted diseases, partner selection, use of condoms, avoidance of unintended pregnancy  and contraceptive alternatives  --Injury prevention: Discussed safety belts, safety helmets, smoke detector, smoking near bedding or upholstery  --Dental health: Discussed importance of regular tooth brushing, flossing, and dental visits  --Immunizations reviewed  --Discussed benefits of screening colonoscopy  --After hours service discussed with patient    3  Discussed the patient's BMI with her  The BMI is in the acceptable range  4  Follow up as needed for acute illness  Subjective     Jaylene Epps is a 35 y o  female and is here for a comprehensive physical exam  The patient reports no problems  Do you take any herbs or supplements that were not prescribed by a doctor? no  Are you taking calcium supplements? no  Are you taking aspirin daily? no     History:  n/a    The following portions of the patient's history were reviewed and updated as appropriate:   She  has no past medical history on file  She   Patient Active Problem List    Diagnosis Date Noted    Anxiety 09/03/2020     She  has a past surgical history that includes [place] tooth extraction;  Tonsillectomy; and Kidney stone surgery  Her family history includes Alzheimer's disease in her family; Breast cancer in her maternal grandmother and paternal grandmother; Cervical cancer in her maternal aunt; Lymphoma in her mother; Osteoporosis in her family; Ovarian cancer in her maternal grandmother  She  reports that she has never smoked  She has never used smokeless tobacco  She reports current alcohol use  She reports that she does not use drugs  Current Outpatient Medications   Medication Sig Dispense Refill    BD [employer] Home MISC Use as directed      folic acid (FOLVITE) 1 mg tablet Take by mouth      B-D 3CC LUER-MARY SYR 12ZI9-1/2 22G X 1-1/2" 3 ML MISC USE WITH PROGESTERONE IN OIL      BD Disp Needles 18G X 1-1/2" MISC USE TO DRAW UP PROGESTERONE IN OIL      BD Disp Needles 27G X 1/2" MISC USE WITH HCG TRIGGER      BD Insulin Syringe U/F 30G X 1/2" 0 5 ML MISC Use as directed      Cetrotide 0 25 MG KIT INJECT 1 SYRINGE UNDER THE SKIN EVERY 24 HOURS      Enskyce 0 15-30 MG-MCG per tablet Take 1 tablet by mouth daily      estradiol (ESTRACE) 2 MG tablet TAKE 1 TABLET BY MOUTH 2 TIMES A DAY START DAY AFTER TRANSFER      Gonal-f RFF Rediject 300 UNIT/0 5ML SOLN INJECT 300IU UNDER THE SKIN EVERY EVENING (SHIP 6 PENS)      leuprolide 1 mg/0 2 mL INJECT UNDER THE SKIN 36 HOURS PRIOR TO RETRIEVAL AS DIRECTED      Pregnyl 37791 units injection INJECT UNDER THE SKIN 36 HOURS PRIOR TO RETRIEVAL      progesterone 50 mg/mL injection INJECT 1 ML EVERY OTHER DAY (IN THE MORNING)       No current facility-administered medications for this visit        Current Outpatient Medications on File Prior to Visit   Medication Sig    BD [employer] Home MISC Use as directed    folic acid (FOLVITE) 1 mg tablet Take by mouth    B-D 3CC LUER-MARY SYR 34LX3-1/2 22G X 1-1/2" 3 ML MISC USE WITH PROGESTERONE IN OIL    BD Disp Needles 18G X 1-1/2" MISC USE TO DRAW UP PROGESTERONE IN OIL    BD Disp Needles 27G X 1/2" MISC USE WITH HCG TRIGGER    BD Insulin Syringe U/F 30G X 1/2" 0 5 ML MISC Use as directed    Cetrotide 0 25 MG KIT INJECT 1 SYRINGE UNDER THE SKIN EVERY 24 HOURS    Enskyce 0 15-30 MG-MCG per tablet Take 1 tablet by mouth daily    estradiol (ESTRACE) 2 MG tablet TAKE 1 TABLET BY MOUTH 2 TIMES A DAY START DAY AFTER TRANSFER    Gonal-f RFF Rediject 300 UNIT/0 5ML SOLN INJECT 300IU UNDER THE SKIN EVERY EVENING (SHIP 6 PENS)    leuprolide 1 mg/0 2 mL INJECT UNDER THE SKIN 36 HOURS PRIOR TO RETRIEVAL AS DIRECTED    Pregnyl 34300 units injection INJECT UNDER THE SKIN 36 HOURS PRIOR TO RETRIEVAL    progesterone 50 mg/mL injection INJECT 1 ML EVERY OTHER DAY (IN THE MORNING)    [DISCONTINUED] azithromycin (ZITHROMAX) 250 mg tablet take 2 tablets by mouth twice a day with meals    [DISCONTINUED] busPIRone (BUSPAR) 5 mg tablet Take 1 tablet (5 mg total) by mouth 2 (two) times a day (Patient not taking: Reported on 5/18/2021)    [DISCONTINUED] diazepam (VALIUM) 5 mg tablet TAKE 1 TABLET BY MOUTH 30 MINUTES PRIOR TO PROCEDURE     No current facility-administered medications on file prior to visit  She is allergic to sulfamethoxazole-trimethoprim       Review of Systems  Do you have pain that bothers you in your daily life? no  Pertinent items are noted in HPI       Objective     /68 (BP Location: Left arm, Patient Position: Sitting, Cuff Size: Standard)   Pulse 75   Temp 98 3 °F (36 8 °C)   Ht 5' 4" (1 626 m)   Wt 61 7 kg (136 lb)   SpO2 98%   BMI 23 34 kg/m²     General Appearance:    Alert, cooperative, no distress, appears stated age   Head:    Normocephalic, without obvious abnormality, atraumatic   Eyes:    PERRL, conjunctiva/corneas clear, EOM's intact, fundi     benign, both eyes   Ears:    Normal TM's and external ear canals, both ears   Nose:   Nares normal, septum midline, mucosa normal, no drainage    or sinus tenderness   Throat:   Lips, mucosa, and tongue normal; teeth and gums normal   Neck:   Supple, symmetrical, trachea midline, no adenopathy;     thyroid:  no enlargement/tenderness/nodules; no carotid    bruit or JVD   Back:     Symmetric, no curvature, ROM normal, no CVA tenderness   Lungs:     Clear to auscultation bilaterally, respirations unlabored   Chest Wall:    No tenderness or deformity    Heart:    Regular rate and rhythm, S1 and S2 normal, no murmur, rub   or gallop   Breast Exam:    No tenderness, masses, or nipple abnormality   Abdomen:     Soft, non-tender, bowel sounds active all four quadrants,     no masses, no organomegaly           Extremities:   Extremities normal, atraumatic, no cyanosis or edema   Pulses:   2+ and symmetric all extremities   Skin:   Skin color, texture, turgor normal, no rashes or lesions   Lymph nodes:   Cervical, supraclavicular, and axillary nodes normal   Neurologic:   CNII-XII intact, normal strength, sensation and reflexes     throughout

## 2021-07-19 ENCOUNTER — APPOINTMENT (OUTPATIENT)
Dept: LAB | Facility: HOSPITAL | Age: 31
End: 2021-07-19
Payer: COMMERCIAL

## 2021-07-19 DIAGNOSIS — E28.9 DISORDER OF ENDOCRINE OVARY: ICD-10-CM

## 2021-07-19 DIAGNOSIS — Z32.00 PREGNANCY EXAMINATION OR TEST, PREGNANCY UNCONFIRMED: ICD-10-CM

## 2021-07-19 DIAGNOSIS — N97.9 PRIMARY FEMALE INFERTILITY: ICD-10-CM

## 2021-07-19 LAB
B-HCG SERPL-ACNC: 6641 MIU/ML
PROGEST SERPL-MCNC: 34.8 NG/ML

## 2021-07-19 PROCEDURE — 36415 COLL VENOUS BLD VENIPUNCTURE: CPT

## 2021-07-19 PROCEDURE — 84702 CHORIONIC GONADOTROPIN TEST: CPT

## 2021-07-19 PROCEDURE — 84144 ASSAY OF PROGESTERONE: CPT

## 2021-07-19 PROCEDURE — 82672 ASSAY OF ESTROGEN: CPT

## 2021-07-24 LAB — ESTROGEN SERPL-MCNC: 3940 PG/ML

## 2021-08-05 ENCOUNTER — TELEPHONE (OUTPATIENT)
Dept: OBGYN CLINIC | Facility: MEDICAL CENTER | Age: 31
End: 2021-08-05

## 2021-08-19 NOTE — PATIENT INSTRUCTIONS
Pregnancy at 11 to 14 Weeks   AMBULATORY CARE:   Changes happening to your body: You are now at the end of your first trimester and entering your second trimester  Morning sickness usually goes away by this time  You may have other symptoms such as fatigue, frequent urination, and headaches  You may have gained 2 to 4 pounds by now  Seek care immediately if:   · You have pain or cramping in your abdomen or low back  · You have heavy vaginal bleeding or clotting  · You pass material that looks like tissue or large clots  Collect the material and bring it with you  Call your doctor or obstetrician if:   · You cannot keep food or drinks down, and you are losing weight  · You have light vaginal bleeding  · You have chills or a fever  · You have vaginal itching, burning, or pain  · You have yellow, green, white, or foul-smelling vaginal discharge  · You have pain or burning when you urinate, less urine than usual, or pink or bloody urine  · You have questions or concerns about your condition or care  How to care for yourself at this stage of your pregnancy:   · Get plenty of rest   You may feel more tired than normal  You may need to take naps or go to bed earlier  · Manage nausea and vomiting  Avoid fatty and spicy foods  Eat small meals throughout the day instead of large meals  Ashlee may help to decrease nausea  Ask your healthcare provider about other ways of decreasing nausea and vomiting  · Eat a variety of healthy foods  Healthy foods include fruits, vegetables, whole-grain breads, low-fat dairy foods, beans, lean meats, and fish  Drink liquids as directed  Ask how much liquid to drink each day and which liquids are best for you  Limit caffeine to less than 200 milligrams each day  Limit your intake of fish to 2 servings each week  Choose fish low in mercury such as canned light tuna, shrimp, salmon, cod, or tilapia   Do not  eat fish high in mercury such as swordfish, tilefish, citlaly mackerel, and shark  · Take prenatal vitamins as directed  Your need for certain vitamins and minerals, such as folic acid, increases during pregnancy  Prenatal vitamins provide some of the extra vitamins and minerals you need  Prenatal vitamins may also help to decrease the risk of certain birth defects  · Do not smoke  Smoking increases your risk of a miscarriage and other health problems during your pregnancy  Smoking can cause your baby to be born too early or weigh less at birth  Ask your healthcare provider for information if you need help quitting  · Do not drink alcohol  Alcohol passes from your body to your baby through the placenta  It can affect your baby's brain development and cause fetal alcohol syndrome (FAS)  FAS is a group of conditions that causes mental, behavior, and growth problems  · Talk to your healthcare provider before you take any medicines  Many medicines may harm your baby if you take them when you are pregnant  Do not take any medicines, vitamins, herbs, or supplements without first talking to your healthcare provider  Never use illegal or street drugs (such as marijuana or cocaine) while you are pregnant  Safety tips during pregnancy:   · Avoid hot tubs and saunas  Do not use a hot tub or sauna while you are pregnant, especially during your first trimester  Hot tubs and saunas may raise your baby's temperature and increase the risk of birth defects  · Avoid toxoplasmosis  This is an infection caused by eating raw meat or being around infected cat feces  It can cause birth defects, miscarriages, and other problems  Wash your hands after you touch raw meat  Make sure any meat is well-cooked before you eat it  Avoid raw eggs and unpasteurized milk  Use gloves or ask someone else to clean your cat's litter box while you are pregnant  Changes happening with your baby: Your baby has fully formed fingernails and toenails   Your baby's heartbeat can now be heard  Ask your healthcare provider if you can listen to your baby's heartbeat  By week 14, your baby is over 4 inches long from the top of the head to the rump (baby's bottom)  Your baby weighs over 3 ounces  Prenatal care:  Prenatal care is a series of visits with your healthcare provider throughout your pregnancy  During the first 28 weeks of your pregnancy, you will see your healthcare provider 1 time each month  Prenatal care can help prevent problems during pregnancy and childbirth  Your healthcare provider will check your blood pressure and weight  Your baby's heart rate will also be checked  You may also need the following at some visits:  · A pelvic exam  allows your healthcare provider to see your cervix (the bottom part of your uterus)  Your healthcare provider will use a speculum to open your vagina  He or she will check the size and shape of your uterus  · Blood tests  may be done to check for any of the following:     ? Gestational diabetes or anemia (low iron level)    ? Blood type or Rh factor, or certain birth defects    ? Immunity to certain diseases, such as chickenpox or rubella    ? An infection, such as a sexually transmitted infection, HIV, or hepatitis B    · Hepatitis B  may need to be prevented or treated  Hepatitis B is inflammation of the liver caused by the hepatitis B virus (HBV)  HBV can spread from a mother to her baby during delivery  You will be checked for HBV as early as possible in the first trimester of each pregnancy  You need the test even if you received the hepatitis B vaccine or were tested before  You may need to have an HBV infection treated before you give birth  · Urine tests  may also be done to check for sugar and protein  These can be signs of gestational diabetes or preeclampsia  Urine tests may also be done to check for signs of infection  · A fetal ultrasound  shows pictures of your baby inside your uterus   The pictures are used to check your baby's development, movement, and position  · Genetic disorder screening tests  may be offered to you  These tests check your baby's risk for genetic disorders such as Down syndrome  A screening test includes a blood test and ultrasound  Follow up with your doctor or obstetrician as directed:  Go to all prenatal visits  Write down your questions so you remember to ask them during your visits  © Copyright [employer] 2021 Information is for End User's use only and may not be sold, redistributed or otherwise used for commercial purposes  All illustrations and images included in CareNotes® are the copyrighted property of [employer]  or [facility]   The above information is an  only  It is not intended as medical advice for individual conditions or treatments  Talk to your doctor, nurse or pharmacist before following any medical regimen to see if it is safe and effective for you

## 2021-08-24 ENCOUNTER — INITIAL PRENATAL (OUTPATIENT)
Dept: OBGYN CLINIC | Facility: MEDICAL CENTER | Age: 31
End: 2021-08-24

## 2021-08-24 DIAGNOSIS — Z34.91 ENCOUNTER FOR PREGNANCY RELATED EXAMINATION IN FIRST TRIMESTER: Primary | ICD-10-CM

## 2021-08-24 PROCEDURE — OBC: Performed by: OBSTETRICS & GYNECOLOGY

## 2021-08-24 RX ORDER — MULTIVIT-MIN/IRON/FOLIC ACID/K 18-600-40
1 CAPSULE ORAL DAILY
COMMUNITY
End: 2021-09-07

## 2021-08-24 RX ORDER — ASPIRIN 81 MG/1
81 TABLET, CHEWABLE ORAL DAILY
COMMUNITY
End: 2021-09-07

## 2021-08-24 RX ORDER — DOXYLAMINE SUCCINATE AND PYRIDOXINE HYDROCHLORIDE 20; 20 MG/1; MG/1
1 TABLET, EXTENDED RELEASE ORAL DAILY
COMMUNITY
End: 2021-10-30 | Stop reason: SDUPTHER

## 2021-08-24 NOTE — PROGRESS NOTES
OB INTAKE INTERVIEW      Pt presents for OB intake  Pre pregnancy weight= 130 pounds    OB History    Para Term  AB Living   1             SAB TAB Ectopic Multiple Live Births                  # Outcome Date GA Lbr Carlos/2nd Weight Sex Delivery Anes PTL Lv   1 Current                  Hx of  delivery prior to 36 weeks 6 days:  NO     Last Menstrual Period:   No LMP recorded  Patient is pregnant  Ultrasound date:  2021 5 weeks 6 days  Estimated date of delivery:   Estimated Date of Delivery: 2022  confirmed by embryo transfer date  ? History of Diabetes: no  History of Hypertension: no      Infection Screening: Does the pt have a hx of MRSA? no    H&P visit scheduled  ?  Interview education  Handouts given: How to Access Pregnancy Essentials Guide   Warning Signs During Pregnancy   Taking Medications During Pregnancy   Childbirth and Parenting Classes brochure  [facility] support center information sheet  [facility] information sheet  COVID-19: has received 2 doses of Moderna Covid Vaccine      [facility]  Discussed genetic testing-    - referral to [facility] given         - Has completed expanded carrier screening with [facility] provider  All results negative    Discussed Tdap and Influenza vaccines           SBIRT Screen: negative  Depression Screening Follow-up Plan: Patient's depression screening was Negative with an Amalia score of  3                  The patient was oriented to our practice and all questions were answered    Interviewed by: Bren Maxwell RN 21

## 2021-09-02 ENCOUNTER — APPOINTMENT (OUTPATIENT)
Dept: LAB | Facility: MEDICAL CENTER | Age: 31
End: 2021-09-02
Payer: COMMERCIAL

## 2021-09-02 DIAGNOSIS — Z34.91 ENCOUNTER FOR PREGNANCY RELATED EXAMINATION IN FIRST TRIMESTER: ICD-10-CM

## 2021-09-02 LAB
ABO GROUP BLD: NORMAL
BACTERIA UR QL AUTO: ABNORMAL /HPF
BASOPHILS # BLD AUTO: 0.05 THOUSANDS/ΜL (ref 0–0.1)
BASOPHILS NFR BLD AUTO: 1 % (ref 0–1)
BILIRUB UR QL STRIP: NEGATIVE
BLD GP AB SCN SERPL QL: NEGATIVE
CAOX CRY URNS QL MICRO: ABNORMAL /HPF
CLARITY UR: ABNORMAL
COLOR UR: YELLOW
EOSINOPHIL # BLD AUTO: 0.07 THOUSAND/ΜL (ref 0–0.61)
EOSINOPHIL NFR BLD AUTO: 1 % (ref 0–6)
ERYTHROCYTE [DISTWIDTH] IN BLOOD BY AUTOMATED COUNT: 12.4 % (ref 11.6–15.1)
GLUCOSE UR STRIP-MCNC: NEGATIVE MG/DL
HBV SURFACE AG SER QL: NORMAL
HCT VFR BLD AUTO: 42.8 % (ref 34.8–46.1)
HGB BLD-MCNC: 13.9 G/DL (ref 11.5–15.4)
HGB UR QL STRIP.AUTO: NEGATIVE
IMM GRANULOCYTES # BLD AUTO: 0.04 THOUSAND/UL (ref 0–0.2)
IMM GRANULOCYTES NFR BLD AUTO: 0 % (ref 0–2)
KETONES UR STRIP-MCNC: NEGATIVE MG/DL
LEUKOCYTE ESTERASE UR QL STRIP: ABNORMAL
LYMPHOCYTES # BLD AUTO: 1.96 THOUSANDS/ΜL (ref 0.6–4.47)
LYMPHOCYTES NFR BLD AUTO: 22 % (ref 14–44)
MCH RBC QN AUTO: 31 PG (ref 26.8–34.3)
MCHC RBC AUTO-ENTMCNC: 32.5 G/DL (ref 31.4–37.4)
MCV RBC AUTO: 96 FL (ref 82–98)
MONOCYTES # BLD AUTO: 0.41 THOUSAND/ΜL (ref 0.17–1.22)
MONOCYTES NFR BLD AUTO: 5 % (ref 4–12)
NEUTROPHILS # BLD AUTO: 6.38 THOUSANDS/ΜL (ref 1.85–7.62)
NEUTS SEG NFR BLD AUTO: 71 % (ref 43–75)
NITRITE UR QL STRIP: NEGATIVE
NON-SQ EPI CELLS URNS QL MICRO: ABNORMAL /HPF
NRBC BLD AUTO-RTO: 0 /100 WBCS
PH UR STRIP.AUTO: 7 [PH]
PLATELET # BLD AUTO: 286 THOUSANDS/UL (ref 149–390)
PMV BLD AUTO: 10.4 FL (ref 8.9–12.7)
PROT UR STRIP-MCNC: NEGATIVE MG/DL
RBC # BLD AUTO: 4.48 MILLION/UL (ref 3.81–5.12)
RBC #/AREA URNS AUTO: ABNORMAL /HPF
RH BLD: POSITIVE
RUBV IGG SERPL IA-ACNC: >175 IU/ML
SP GR UR STRIP.AUTO: 1.02 (ref 1–1.03)
SPECIMEN EXPIRATION DATE: NORMAL
UROBILINOGEN UR QL STRIP.AUTO: 1 E.U./DL
WBC # BLD AUTO: 8.91 THOUSAND/UL (ref 4.31–10.16)
WBC #/AREA URNS AUTO: ABNORMAL /HPF

## 2021-09-02 PROCEDURE — 81001 URINALYSIS AUTO W/SCOPE: CPT

## 2021-09-02 PROCEDURE — 87086 URINE CULTURE/COLONY COUNT: CPT

## 2021-09-02 PROCEDURE — 80081 OBSTETRIC PANEL INC HIV TSTG: CPT

## 2021-09-02 PROCEDURE — 36415 COLL VENOUS BLD VENIPUNCTURE: CPT

## 2021-09-03 LAB
BACTERIA UR CULT: NORMAL
HIV 1+2 AB+HIV1 P24 AG SERPL QL IA: NORMAL
RPR SER QL: NORMAL

## 2021-09-03 NOTE — PROGRESS NOTES
Prenatal H&P     Denies loss of fluid, vaginal discharge and abdominal pain  Confirms fetal movement, flutters  Tolerating prenatal vitamin well  Prenatal panel reviewed-WNL  Plans for genetic screening [facility] appointment 9/17/21  Planned  pregnancy  OB history:     G1 -  embryo transfer with Dr Boni Doshi     Dating:          Embryo transfer date 7/1/21     US on 7/23/21 @  5w6d [facility] 3/19/22  Working [facility] 3/19/22    Surgical history:      Exploratory laparotomy, hysteroscopy, lithotripsy, tonsil and wisdom teeth removal     Medical History:     Anxiety, infertility and abnormal pap smear (2014)     Social history:     Alcohol/ tobacco/ illicit drug -rare alcohol use prior to pregnancy/denies tobacco and drug use     Denies history of STD/STI     Work/ housing/ support - [employer] / house - stable/ FOB, family and friends supportive     PCP/ Dental- May PCP/ last dental cleaning 9 months      SBIRT- screen negative     She denies a hx of recent cough, chills, fever,  STD/STI, denies a personal history  of TB, COVID-19 and Zika virus or close contacts with persons with TB or COVID -19  She denies a family history of inheritable conditions such as physical or intellectual disabilities, birth defects, blood disorders, heart or neural tube defects  She has never had MRSA  She denies recent travel or travel planned in the near future  Plan:  1  Continue prenatal vitamin daily  2  Genetic screening/ [facility]  Appointment 9/17/21   3  Weight gain in pregnancy-  Who BMI recommends  25-35 lb weight gain this pregnancy ; Healthy diet and daily exercise reviewed and encouraged  4  Unit regimen reviewed visit every 4 weeks until 28 weeks, every 2 weeks until 36 weeks and then weekly until delivery  Reviewed with patient urine will be obtained at every visit  5  Gonorrhea/ chlamydia collected  Pap UTD   6   Reviewed with patient [place] considers pregnancy high risk therefore  pregnant patients are eligible for COVID-19 vaccinations  Had vaccine  7  Reviewed with patient  risk factors for preeclampsia include primip  no longer necessary to take low-dose aspirin  8  Common discomforts of pregnancy and precautions reviewed  Signs and symptoms to report reviewed  Encouraged social distancing, good hand hygiene, masking, avoiding crowds and written information provided about COVID-19    RTO 4 weeks

## 2021-09-03 NOTE — PATIENT INSTRUCTIONS
First off, a study released 3/25/21 demonstrating positive vaccine-generated antibodies present in umbilical cord blood and breastmilk of vaccinated pregnant women  This is great news  The research group evaluated 131 reproductive-age women (84 pregnant, 31 lactating, and 16 non-pregnant) and evaluated several antibody types, measuring them in blood and breastmilk, at several time points (baseline, second vaccine dose, 2-6 weeks post second vaccine, and at delivery)  They also evaluated the cord blood of 10 babies  They compared these to women who had antibodies after coronavirus infection  Antibody levels were equivalent when pregnant and lactating women were compared to non-pregnant women, suggesting a good immune response in pregnancy and lactation  Antibody levels were higher after vaccination than after infection  Vaccine-generated antibodies were present in all cord blood and breastmilk samples  The link to the abstract can be found here:  https://www [email or web address]    Other resources are here:    1  The V-safe program   [employer]  V-safe is a smartphone-based tool that uses text messaging and web surveys to provide personalized health check-ins after you receive a COVID-19 vaccine  As of 4/5/21, a total of [id number],960 pregnant women have enrolled  2   The v-safe COVID-19 Vaccine Pregnancy Registry  [employer]  This registry is for v-safe participants who report vaccination in the periconception period or during pregnancy  The registry activities are in addition to the v-safe after vaccination health check-ins that participants receive via text message  Pregnant participants in the registry will be contacted to answer questions about their pregnancy and medical history    Participants will also be asked for permission to contact their healthcare provider(s)  3   As an FYI, [employer]) is currently running a randomized placebo-controlled trial of pregnant women: https://clinicaltrials gov/ct2/show/AAB07801443        4   This study (COVID-PRICE): https://clinicaltrials gov/ct2/show/ERI81074293 is being conducted nearby ([facility] in [place])  It involves collection of maternal blood, cord blood, and breastmilk  We [facility] are not a study site, but if you are interested in participating, I can assist with connecting you with study staff  5   This study (C-VIPER) NotebookPreviews it is available though doesn't appear to have yet started recruiting  These are just studies listed through clinicaltrials gov  There are likely other studies available that may not be nationally registered  Lastly, here is a special video from two prominent [place]  physicians all about the vaccine in pregnancy: [email or web address]      COVID-19 and Pregnancy   AMBULATORY CARE:   What you need to know about coronavirus disease 2019 (COVID-19) and pregnancy:  Pregnancy increases your risk for severe COVID-19 illness  COVID-19 can also lead to  delivery of your baby  Most babies who become infected with the new virus do not develop serious effects, but some do  It is important for you and your baby to stay safe during pregnancy and delivery  Signs and symptoms of COVID-19 in newborns: The following signs and symptoms may be from COVID-19, but they are also common in newborns  Your 's healthcare provider may recommend testing to confirm or rule out COVID-19  Your  may need a second test if the first is negative    · Fever    · Not moving arms or legs much, or being too sleepy to feed    · A runny nose or cough    · Fast breathing, or trouble breathing    · Vomiting, diarrhea, or not feeding well    If you think you, your baby, or someone in your home may be infected:  Do the following to protect others:  · If emergency care is needed,  tell the  about the possible infection, or call ahead and tell the emergency department  · Call a healthcare provider  for instructions if symptoms are mild  Anyone who may be infected should not  arrive without calling first  The provider will need to protect staff members and other patients  · The person who may be infected needs to wear a face covering  while getting medical care  This will help lower the risk of infecting others  Coverings are not used for anyone who is younger than 2 years, has breathing problems, or cannot remove it  The provider can give you instructions for anyone who cannot wear a covering  Call your local emergency number (911 in the [address] [place] [address],[address]) or go to the emergency department if:   · You have trouble breathing or shortness of breath at rest     · You have chest pain or pressure that lasts longer than 5 minutes  · You become confused or hard to wake  · Your lips or face are blue  · You have a fever of 104°F (40°C) or higher  Call your doctor if:   · You have signs or symptoms of COVID-19  Try to call within 24 hours of when you start to feel sick  · You do not  have symptoms of COVID-19 but had close physical contact within 14 days with someone who tested positive  · You have questions or concerns about your condition or care  How the 2019 coronavirus spreads: The virus spreads quickly and easily  The virus can be passed starting 2 days before symptoms begin or before a positive test if symptoms never begin  The following are ways the virus is thought to spread, but more information may be coming:  · Droplets are the main way all coronaviruses spread  The virus travels in droplets that form when a person talks, coughs, or sneezes  The droplets can also float in the air for minutes or hours  Infection happens when you breathe in the droplets or get them in your eyes or nose  Close personal contact with an infected person increases your risk for infection  This means being within 6 feet (2 meters) of the person for at least 15 minutes over 24 hours  · Person-to-person contact can spread the virus  For example, a person with the virus on his or her hands can spread it by shaking hands with someone  · The virus can stay on objects and surfaces for a short time  You may become infected by touching the object or surface and then touching your eyes or mouth  · An infected animal may be able to infect a person who touches it  This may happen at live markets or on a farm  Protect yourself and your baby while you are pregnant: If you have COVID-19 during your pregnancy, healthcare providers will monitor you and your baby closely  Work with your healthcare provider or obstetrician  If you do not have either, experts recommend you contact a local [facility] health center or health department  The best way to prevent infection is to avoid anyone who is infected, but this can be hard to do  An infected person can spread the virus before signs or symptoms develop, or even if signs or symptoms never develop  The following can help keep you and your baby safe:     · Wash your hands throughout the day  Use soap and water  Rub your soapy hands together, lacing your fingers  Wash the front and back of each hand, and in between your fingers  Use the fingers of one hand to scrub under the fingernails of the other hand  Wash for at least 20 seconds  Rinse with warm, running water for several seconds  Dry your hands with a clean towel or paper towel  Use hand  that contains alcohol if soap and water are not available  If you must go out, wash your hands before you leave your home and when you get home  Wash your hands after you put items away  Be careful about what you touch while you are out  · Protect yourself from sneezes and coughs    Turn your face away and cover your mouth and nose if you are around someone who is sneezing or coughing  This helps protect you from the person's droplets  Cover your mouth and nose with a tissue when you need to sneeze or cough  Use the bend of your arm if you do not have a tissue  Throw the tissue away  Then wash your hands or use hand   · Make a habit of not touching your face  If you get the virus on your hands, you can transfer it to your eyes, nose, or mouth and become infected  · Follow worldwide, national, and local social distancing guidelines  Social distancing means staying far enough away physically from others that the virus cannot spread from one person to another  If you must go out, avoid crowds and large gatherings  Gatherings or crowds of 10 or more individuals can cause the virus to spread  Avoid places such as g[place]vara, beaches, sporting events, and tourist attractions  For events such as parties, holiday meals, [employer]ism services, and conferences, attend virtually (on a computer), if possible  · Wear a face covering (mask) around anyone who does not live in your home  A covering helps protect the person wearing it from being infected or passing the virus to others  Do not  wear a plastic face shield instead of a covering  You can use both together for extra protection  Use a disposable non-medical mask, or make a cloth covering with at least 2 layers  You can also create layers by putting a cloth covering over a disposable non-medical mask  Cover your mouth and your nose  Securely fasten it under your chin and on the sides of your face  A face covering is not a substitute for other safety measures  Continue social distancing and washing your hands often  Do not put a face shield or covering on your   These increase the risk for sudden infant death syndrome (SIDS)  · Stay at least 6 feet (2 meters) away from anyone who does not live in your home    Keep this distance every time you go out of your home and are around another person  Do not shake hands with, hug, or kiss a person as a greeting  Stand or walk as far from others as possible, especially around anyone who is sneezing or coughing  If you must use public transportation (such as a bus or subway), try to sit or stand away from others  Do not go to someone else's home unless it is necessary  Do not go over to visit, even if you are lonely, or the person is  Only go if you need to help him or her  · Stay safe if you must go out to work  Keep physical distance between you and other workers as much as possible  Follow your employer's rules so everyone stays safe  · Clean and disinfect high-touch surfaces and objects in your home often  Use disinfecting wipes or make a solution of 4 teaspoons of bleach in 1 quart (4 cups) of water  Clean surfaces and objects in the room where your baby will be sleeping, especially right before you give birth  Wash your hands after you clean and disinfect  Be careful with cleaning products  Read the labels to make sure they are safe to use during pregnancy  Open windows to make sure you have good ventilation  What you can do to have a healthy pregnancy during the COVID-19 outbreak:   · Keep all prenatal and  appointments  You may be able to have certain prenatal appointments without having to go into the provider's office  Some providers offer phone, video, or other types of appointments  You may also be able to get prescriptions for a few months at a time  This will help lower the number of trips you need to make to the pharmacy for refills  If you do need to go into your provider's office, take precautions  Put a face covering on before you go into the office  Do not stand or sit within 6 feet (2 meters) of anyone in the waiting room, if possible  Do not stand or sit near anyone who is not wearing a face covering  · Get recommended vaccines    Your healthcare provider can tell you if you need vaccines not listed below, and when to get them  ? Ask about the COVID-19 vaccine  Your healthcare provider may recommend that you get the vaccine now if you are at high risk for COVID-19  Make sure you understand the risks and benefits of getting the vaccine during pregnancy  Do not get a COVID-19 vaccine until you and your healthcare provider decide it is right for you  Even after you get the vaccine, continue wearing a face covering, handwashing, and social distancing  These are still the best ways to prevent infection  ? Get the influenza (flu) vaccine  Try to get the vaccine as soon as recommended, usually starting in September or October  ? Get the Tdap vaccine  The Tdap vaccine protects you from tetanus, diphtheria, and pertussis  If possible, get the vaccine during weeks 27 to 36 of your pregnancy  You should get a dose of Tdap with each pregnancy  · Take prenatal vitamins as directed  Your prenatal vitamins should contain folic acid  You need about 600 micrograms (mcg) of folic acid each day during pregnancy  Folic acid helps to form your baby's brain and spinal cord in early pregnancy  · Eat a variety of healthy foods  Healthy foods are important, even if you take a prenatal vitamin  Healthy foods contain nutrients that help keep your immune system strong  Examples of healthy foods include vegetables, fruits, whole-grain breads and cereals, lean meats and poultry, fish, low-fat dairy products, and cooked beans  Do not have raw, undercooked, or unpasteurized food or drinks  Unpasteurized foods are foods that have not gone through the heating process (pasteurization) that destroys bacteria  Your healthcare provider or a dietitian can help you create healthy meal plans  · Talk to your healthcare provider about exercise  Moderate exercise can help keep your immune system strong  Your healthcare provider can help you plan an exercise program that is safe for you during pregnancy   You may need to exercise at home if you cannot exercise outdoors, such as walking in a park  If you want to do pregnancy yoga or other group activities, be safe  Stay at least 6 feet (2 meters) away from others in the class, and the instructor  Wash your hands before you leave the building  Follow the facility's instructions for preventing infections  · Try to lower your stress  You may be feeling more stressed than usual because of the COVID-19 outbreak  You may also feel stress from not being able to share your pregnancy with others  For example, you may not be able to have someone with you during prenatal visits or ultrasounds  Talk to your healthcare providers about ways to manage stress during this time  Pick 1 or 2 times a day to watch the news  Constant news watching about COVID-19 can increase your stress levels  Set a sleep schedule to go to bed and wake up at the same times each day  · Do not smoke cigarettes, drink alcohol, or use drugs  Nicotine and other chemicals in cigarettes and cigars can harm your baby and your health  Alcohol can increase your risk for a miscarriage  Your baby may also be born too small or have other health problems  Certain drugs can be passed to your baby before he or she is born  Some can be passed through breast milk  It is best to quit cigarettes, alcohol, and drugs before you become pregnant and not start again after your baby is born  Ask your healthcare provider for information if you currently use any of these and need help to quit  Protect your  during delivery and while you are in the hospital:  It is not known for sure if an unborn baby can be infected with the virus that causes COVID-19  Some newborns have tested positive for the virus  The newborns may have been infected before, during, or after birth  The greatest risk is for a  to be in close contact with an infected person   Your baby may be tested for the virus soon after being born if you have COVID-19  He or she may be tested again before you leave the hospital  This depends on whether your baby has any signs or symptoms of COVID-19  You will be able to make choices for you and your baby during your hospital stay  Talk to healthcare providers about the following:  · Ask about temporary separation if you have COVID-19  Temporary separation means your  is moved to a different room from you  You will be able to make the decision if you want to do this  Separation will help lower your 's risk for being infected  You will still be able to give your  breast milk  You may need to pump the milk from your breasts  Someone who does not have COVID-19 will then feed the pumped milk to your   You may instead choose to have your baby brought to you when you want to breastfeed  Take precautions to keep your baby safe  Wash your hands and the skin around your nipples before you hold your baby  Wear a face covering while you breastfeed  · Be careful if you have COVID-19 and do not choose temporary separation  Healthcare providers will keep your  at least 6 feet (2 meters) away from you as much as possible  Your  may be placed in an incubator  The incubator will help protect your  from infection  Always wash your hands and put on a face covering when you hold, touch, or have close contact with your   · Ask about visitors  The facility may not be allowing any visitors to newborns during this time  If you are allowed visitors, you may need to limit how many you can have at a time  Do not allow anyone who has known or suspected COVID-19 to visit  Even without signs or symptoms, the person can infect your  or others in the room  All visitors need to wash their hands and put on clean face coverings before entering your room  The face covering needs to stay on during the whole visit   Do not let anyone take the face covering down to make faces at your baby, talk, sneeze, or cough  Do not let anyone kiss you or your baby  Protect your  at home:   · You can choose to continue temporary separation if you have COVID-19  You can do this if an adult who does not have COVID-19 can care for your   Your healthcare provider can give you instructions on how to do this safely at home  Only have close contact with your  when needed  Remember to wash your hands and put on a clean face covering first  You may need to continue pumping your breast milk  A healthy adult can feed the pumped breast milk to your   You may instead choose to have your baby brought to you when you want to breastfeed  Take precautions to keep your baby safe  Wash your hands and the skin around your nipples before you hold your baby  You will also need to wear a face covering while you breastfeed  · Use face coverings safely  Everyone who has COVID-19 needs to wear a clean face covering while being within 6 feet (2 meters) of your   This includes other children in your home who are 2 years or older  Do not put a face covering or plastic face shield on your   Any covering increases your 's risk for sudden infant death syndrome (SIDS)  Do not use coverings on children younger than 2 years or on anyone who has breathing problems or cannot remove it  · Be careful about visitors  Continue precautions you used in the hospital  Do not allow anyone who has known or suspected COVID-19 to come over to see your   Have visitors put on clean face coverings before they enter your home  Have them wash their hands as soon as they come in  The face covering needs to stay on during the whole visit  · Keep all checkup appointments  You may be able to have some appointments by phone or video meeting  Other appointments will need to be in person, such as for vaccines  Vaccines are normally given to babies at certain ages   Until COVID-19 is under control, your 's provider will give you a vaccine schedule  It is important for your  to get all recommended vaccines  What you need to know about breastfeeding:  Breastfeeding for the first 6 months decreases your baby's risk for respiratory (lung) infections, allergies, asthma, and stomach problems  Breast milk also helps your baby develop a strong immune system  Breast milk is considered safe, even if you have COVID-19  Experts currently believe the virus that causes COVID-19 does not spread in breast milk  Do the following to help protect your baby:  · Wash your hands before every breastfeeding or pumping session  Even if you do not have COVID-19, you can transfer the virus from your hands to your baby or the pump  Use soap and water to wash your hands whenever possible  Use hand  that contains alcohol if soap and water are not available  · Clean and sanitize your breast pump after each use  Follow the 's directions for cleaning and sanitizing the pump  It is important not to use it until it is clean and sanitized  · If you have COVID-19:      ? Wear a face covering while you breastfeed or pump  This will help prevent you from passing the virus through droplets when you talk, cough, sneeze, or laugh  The virus can stay on surfaces such as a breast pump for hours to days  ? Have someone who is not infected bottle feed your baby, if possible  Have the person wash his or her hands with soap and water before each feeding  The person can feed your  pumped breast milk or formula  Follow up with your doctor or obstetrician as directed:  Write down your questions so you remember to ask them during your visits    For more information:   · Centers for Disease Control and Prevention  [address] , [address]  Phone: 0- 870 - 973-5855  Web Address: [employer]     © 4821 [facility]  Information is for End User's use only and may not be sold, redistributed or otherwise used for commercial purposes  All illustrations and images included in CareNotes® are the copyrighted property of A D A M , Inc  or [employer]   The above information is an  only  It is not intended as medical advice for individual conditions or treatments  Talk to your doctor, nurse or pharmacist before following any medical regimen to see if it is safe and effective for you  Pregnancy at 15 to 18 Weeks   [address]:   What changes are happening in my body? Now that you are in your second trimester, you have more energy  You may also feel hungrier than usual  You may start to experience other symptoms, such as heartburn or dizziness  You may be gaining about ½ to 1 pound a week, and your pregnancy is beginning to show  You may need to start wearing maternity clothes  How do I care for myself at this stage of my pregnancy? · Manage heartburn  by eating 4 or 5 small meals each day instead of large meals  Avoid spicy foods  Avoid eating right before bedtime  · Manage nausea and vomiting  Avoid fatty and spicy foods  Eat small meals throughout the day instead of large meals  Ashlee may help to decrease nausea  Ask your healthcare provider about other ways of decreasing nausea and vomiting  · Eat a variety of healthy foods  Healthy foods include fruits, vegetables, whole-grain breads, low-fat dairy foods, beans, lean meats, and fish  Drink liquids as directed  Ask how much liquid to drink each day and which liquids are best for you  Limit caffeine to less than 200 milligrams each day  Limit your intake of fish to 2 servings each week  Choose fish low in mercury such as canned light tuna, shrimp, salmon, cod, or tilapia  Do not  eat fish high in mercury such as swordfish, tilefish, citlaly mackerel, and shark  · Take prenatal vitamins as directed    Your need for certain vitamins and minerals, such as folic acid, increases during pregnancy  Prenatal vitamins provide some of the extra vitamins and minerals you need  Prenatal vitamins may also help to decrease the risk of certain birth defects  · Do not smoke  Smoking increases your risk of a miscarriage and other health problems during your pregnancy  Smoking can cause your baby to be born too early or weigh less at birth  Ask your healthcare provider for information if you need help quitting  · Do not drink alcohol  Alcohol passes from your body to your baby through the placenta  It can affect your baby's brain development and cause fetal alcohol syndrome (FAS)  FAS is a group of conditions that causes mental, behavior, and growth problems  · Talk to your healthcare provider before you take any medicines  Many medicines may harm your baby if you take them when you are pregnant  Do not take any medicines, vitamins, herbs, or supplements without first talking to your healthcare provider  Never use illegal or street drugs (such as marijuana or cocaine) while you are pregnant  What are some safety tips during pregnancy? · Avoid hot tubs and saunas  Do not use a hot tub or sauna while you are pregnant, especially during your first trimester  Hot tubs and saunas may raise your baby's temperature and increase the risk of birth defects  · Avoid toxoplasmosis  This is an infection caused by eating raw meat or being around infected cat feces  It can cause birth defects, miscarriages, and other problems  Wash your hands after you touch raw meat  Make sure any meat is well-cooked before you eat it  Avoid raw eggs and unpasteurized milk  Use gloves or ask someone else to clean your cat's litter box while you are pregnant  What changes are happening with my baby? By 18 weeks, your baby may be about 6 inches long from the top of the head to the rump (baby's bottom)  Your baby may weigh about 11 ounces  You may be able to feel your baby's movement at about 18 weeks or later   The first movements may not be that noticeable  They may feel like a fluttering sensation  Your baby also makes sucking movements and can hear certain sounds  What do I need to know about prenatal care? During the first 28 weeks of your pregnancy, you will see your healthcare provider once a month  Your healthcare provider will check your blood pressure and weight  You may also need any of the following:  · A urine test  may also be done to check for sugar and protein  These can be signs of gestational diabetes or infection  · A blood test  may be done to check for anemia (low iron level)  · Fundal height check  is a measurement of your uterus to check your baby's growth  This number is usually the same as the number of weeks that you have been pregnant  · An ultrasound  may be done to check your baby's development  Your healthcare provider may be able to tell you what your baby's gender is during the ultrasound  · Your baby's heart rate  will be checked  When should I seek immediate care? · You have pain or cramping in your abdomen or low back  · You have heavy vaginal bleeding or clotting  · You pass material that looks like tissue or large clots  Collect the material and bring it with you  When should I contact my healthcare provider? · You cannot keep food or drinks down, and you are losing weight  · You have light bleeding  · You have chills or a fever  · You have vaginal itching, burning, or pain  · You have yellow, green, white, or foul-smelling vaginal discharge  · You have pain or burning when you urinate, less urine than usual, or pink or bloody urine  · You have questions or concerns about your condition or care  CARE AGREEMENT:   You have the right to help plan your care  Learn about your health condition and how it may be treated  Discuss treatment options with your healthcare providers to decide what care you want to receive   You always have the right to refuse treatment  The above information is an  only  It is not intended as medical advice for individual conditions or treatments  Talk to your doctor, nurse or pharmacist before following any medical regimen to see if it is safe and effective for you  © Copyright [employer] 2021 Information is for End User's use only and may not be sold, redistributed or otherwise used for commercial purposes  All illustrations and images included in CareNotes® are the copyrighted property of [employer] , [employer]  or [address]  Pregnancy at 11 to [address]:   What changes are happening in my body? You are now at the end of your first trimester and entering your second trimester  Morning sickness usually goes away by this time  You may have other symptoms such as fatigue, frequent urination, and headaches  You may have gained 2 to 4 pounds by now  How do I care for myself at this stage of my pregnancy? · Get plenty of rest   You may feel more tired than normal  You may need to take naps or go to bed earlier  · Manage nausea and vomiting  Avoid fatty and spicy foods  Eat small meals throughout the day instead of large meals  Ashlee may help to decrease nausea  Ask your healthcare provider about other ways of decreasing nausea and vomiting  · Eat a variety of healthy foods  Healthy foods include fruits, vegetables, whole-grain breads, low-fat dairy foods, beans, lean meats, and fish  Drink liquids as directed  Ask how much liquid to drink each day and which liquids are best for you  Limit caffeine to less than 200 milligrams each day  Limit your intake of fish to 2 servings each week  Choose fish low in mercury such as canned light tuna, shrimp, salmon, cod, or tilapia  Do not  eat fish high in mercury such as swordfish, tilefish, citlaly mackerel, and shark  · Take prenatal vitamins as directed    Your need for certain vitamins and minerals, such as folic acid, increases during pregnancy  Prenatal vitamins provide some of the extra vitamins and minerals you need  Prenatal vitamins may also help to decrease the risk of certain birth defects  · Do not smoke  Smoking increases your risk of a miscarriage and other health problems during your pregnancy  Smoking can cause your baby to be born too early or weigh less at birth  Ask your healthcare provider for information if you need help quitting  · Do not drink alcohol  Alcohol passes from your body to your baby through the placenta  It can affect your baby's brain development and cause fetal alcohol syndrome (FAS)  FAS is a group of conditions that causes mental, behavior, and growth problems  · Talk to your healthcare provider before you take any medicines  Many medicines may harm your baby if you take them when you are pregnant  Do not take any medicines, vitamins, herbs, or supplements without first talking to your healthcare provider  Never use illegal or street drugs (such as marijuana or cocaine) while you are pregnant  What are some safety tips during pregnancy? · Avoid hot tubs and saunas  Do not use a hot tub or sauna while you are pregnant, especially during your first trimester  Hot tubs and saunas may raise your baby's temperature and increase the risk of birth defects  · Avoid toxoplasmosis  This is an infection caused by eating raw meat or being around infected cat feces  It can cause birth defects, miscarriages, and other problems  Wash your hands after you touch raw meat  Make sure any meat is well-cooked before you eat it  Avoid raw eggs and unpasteurized milk  Use gloves or ask someone else to clean your cat's litter box while you are pregnant  What changes are happening with my baby? Your baby has fully formed fingernails and toenails  Your baby's heartbeat can now be heard  Ask your healthcare provider if you can listen to your baby's heartbeat   By week 14, your baby is over 4 inches long from the top of the head to the rump (baby's bottom)  Your baby weighs over 3 ounces  What do I need to know about prenatal care? Prenatal care is a series of visits with your healthcare provider throughout your pregnancy  During the first 28 weeks of your pregnancy, you will see your healthcare provider 1 time each month  Prenatal care can help prevent problems during pregnancy and childbirth  Your healthcare provider will check your blood pressure and weight  Your baby's heart rate will also be checked  You may also need the following at some visits:  · A pelvic exam  allows your healthcare provider to see your cervix (the bottom part of your uterus)  Your healthcare provider will use a speculum to open your vagina  He or she will check the size and shape of your uterus  · Blood tests  may be done to check for any of the following:     ? Gestational diabetes or anemia (low iron level)    ? Blood type or Rh factor, or certain birth defects    ? Immunity to certain diseases, such as chickenpox or rubella    ? An infection, such as a sexually transmitted infection, HIV, or hepatitis B    · Hepatitis B  may need to be prevented or treated  Hepatitis B is inflammation of the liver caused by the hepatitis B virus (HBV)  HBV can spread from a mother to her baby during delivery  You will be checked for HBV as early as possible in the first trimester of each pregnancy  You need the test even if you received the hepatitis B vaccine or were tested before  You may need to have an HBV infection treated before you give birth  · Urine tests  may also be done to check for sugar and protein  These can be signs of gestational diabetes or preeclampsia  Urine tests may also be done to check for signs of infection  · A fetal ultrasound  shows pictures of your baby inside your uterus  The pictures are used to check your baby's development, movement, and position  · Genetic disorder screening tests  may be offered to you  These tests check your baby's risk for genetic disorders such as Down syndrome  A screening test includes a blood test and ultrasound  When should I seek immediate care? · You have pain or cramping in your abdomen or low back  · You have heavy vaginal bleeding or clotting  · You pass material that looks like tissue or large clots  Collect the material and bring it with you  When should I call my doctor or obstetrician? · You cannot keep food or drinks down, and you are losing weight  · You have light bleeding  · You have chills or a fever  · You have vaginal itching, burning, or pain  · You have yellow, green, white, or foul-smelling vaginal discharge  · You have pain or burning when you urinate, less urine than usual, or pink or bloody urine  · You have questions or concerns about your condition or care  CARE AGREEMENT:   You have the right to help plan your care  Learn about your health condition and how it may be treated  Discuss treatment options with your healthcare providers to decide what care you want to receive  You always have the right to refuse treatment  The above information is an  only  It is not intended as medical advice for individual conditions or treatments  Talk to your doctor, nurse or pharmacist before following any medical regimen to see if it is safe and effective for you  © Copyright [employer] 2021 Information is for End User's use only and may not be sold, redistributed or otherwise used for commercial purposes   All illustrations and images included in CareNotes® are the copyrighted property of A D A [employer] , Inc  or [address] [place] [address]

## 2021-09-07 ENCOUNTER — INITIAL PRENATAL (OUTPATIENT)
Dept: OBGYN CLINIC | Facility: MEDICAL CENTER | Age: 31
End: 2021-09-07
Payer: COMMERCIAL

## 2021-09-07 VITALS — WEIGHT: 140 LBS | BODY MASS INDEX: 24.03 KG/M2 | DIASTOLIC BLOOD PRESSURE: 40 MMHG | SYSTOLIC BLOOD PRESSURE: 100 MMHG

## 2021-09-07 DIAGNOSIS — Z11.3 ROUTINE SCREENING FOR STI (SEXUALLY TRANSMITTED INFECTION): ICD-10-CM

## 2021-09-07 DIAGNOSIS — O09.811 ENCOUNTER FOR SUPERVISION OF PREGNANCY RESULTING FROM ASSISTED REPRODUCTIVE TECHNOLOGY IN FIRST TRIMESTER: Primary | ICD-10-CM

## 2021-09-07 DIAGNOSIS — Z3A.12 12 WEEKS GESTATION OF PREGNANCY: ICD-10-CM

## 2021-09-07 PROCEDURE — 87491 CHLMYD TRACH DNA AMP PROBE: CPT | Performed by: NURSE PRACTITIONER

## 2021-09-07 PROCEDURE — 99214 OFFICE O/P EST MOD 30 MIN: CPT | Performed by: NURSE PRACTITIONER

## 2021-09-07 PROCEDURE — 87591 N.GONORRHOEAE DNA AMP PROB: CPT | Performed by: NURSE PRACTITIONER

## 2021-09-08 LAB
C TRACH DNA SPEC QL NAA+PROBE: NEGATIVE
N GONORRHOEA DNA SPEC QL NAA+PROBE: NEGATIVE

## 2021-09-10 ENCOUNTER — TELEPHONE (OUTPATIENT)
Dept: PERINATAL CARE | Facility: CLINIC | Age: 31
End: 2021-09-10

## 2021-09-10 NOTE — TELEPHONE ENCOUNTER
Phone call to patient and confirmed MFM NT US appt  Explained TEE sent an important message via [facility]  Message contains a video link by [facility] that reviews genetic testing verus screening and information on how to check insurance coverage for specialty genetic labs  Our perinatology Doctors encourage patients to review this information prior to C/ Kieran Aguilera appointment, after the ultrasound is complete the Dr  will do a consult with you and discuss lab ordering  After viewing video patient instructed to call [facility] nurse line with any questions @ # [phone]  Patient verbalized understanding of all information

## 2021-09-17 ENCOUNTER — APPOINTMENT (OUTPATIENT)
Dept: LAB | Facility: HOSPITAL | Age: 31
End: 2021-09-17
Attending: OBSTETRICS & GYNECOLOGY
Payer: COMMERCIAL

## 2021-09-17 ENCOUNTER — ROUTINE PRENATAL (OUTPATIENT)
Dept: PERINATAL CARE | Facility: CLINIC | Age: 31
End: 2021-09-17
Payer: COMMERCIAL

## 2021-09-17 VITALS
HEIGHT: 64 IN | DIASTOLIC BLOOD PRESSURE: 60 MMHG | BODY MASS INDEX: 24.04 KG/M2 | SYSTOLIC BLOOD PRESSURE: 102 MMHG | WEIGHT: 140.8 LBS | HEART RATE: 108 BPM

## 2021-09-17 DIAGNOSIS — Z36.82 ENCOUNTER FOR ANTENATAL SCREENING FOR NUCHAL TRANSLUCENCY: ICD-10-CM

## 2021-09-17 DIAGNOSIS — O09.811 PREGNANCY RESULTING FROM IN VITRO FERTILIZATION, FIRST TRIMESTER: Primary | ICD-10-CM

## 2021-09-17 DIAGNOSIS — Z33.1 PREGNANT STATE, INCIDENTAL: ICD-10-CM

## 2021-09-17 DIAGNOSIS — Z36.9 UNSPECIFIED ANTENATAL SCREENING: ICD-10-CM

## 2021-09-17 DIAGNOSIS — Z3A.13 13 WEEKS GESTATION OF PREGNANCY: ICD-10-CM

## 2021-09-17 PROCEDURE — 76813 OB US NUCHAL MEAS 1 GEST: CPT | Performed by: OBSTETRICS & GYNECOLOGY

## 2021-09-17 PROCEDURE — 36415 COLL VENOUS BLD VENIPUNCTURE: CPT

## 2021-09-17 PROCEDURE — 99242 OFF/OP CONSLTJ NEW/EST SF 20: CPT | Performed by: OBSTETRICS & GYNECOLOGY

## 2021-09-17 RX ORDER — ASPIRIN 81 MG/1
162 TABLET, CHEWABLE ORAL DAILY
Qty: 180 TABLET | Refills: 1 | Status: SHIPPED | OUTPATIENT
Start: 2021-09-17 | End: 2022-02-22 | Stop reason: ALTCHOICE

## 2021-09-17 NOTE — PROGRESS NOTES
[employer] employee (or [employer] employee dependent) chose [id number] screen  Patient  provided with [employer] brochure, test kit, [employer] and test requisition form  Patient  instructed [id number] lab must be drawn at an outpatient [facility] laboratory  Patient informed blood specimen is sent via [address] [place] [address] and screen processed/completed by [facility] in- network lab- [facility]  Patient instructed to check with [facility] insurance provider before having lab drawn to check her OOP copay/deductible, she must meet [facility] $250 00 lab deductible  If patient receives bill for more than $250 00 ( [facility] lab deductible) advised to notify M office  Patient verbalized understanding of all instructions and has office # to call back any questions

## 2021-09-17 NOTE — LETTER
September 17, 2021     Terrance Pace MD  [address]    Patient: Martine Nunez   YOB: 1990   Date of Visit: 9/17/2021       Dear Dr Naida Jacobs:    Thank you for referring Martine Nunez to me for evaluation  Below are my notes for this consultation  If you have questions, please do not hesitate to call me  I look forward to following your patient along with you  Sincerely,        Mira Vance MD        CC: No Recipients  Mira Vance MD  9/17/2021  7:24 AM  Sign when Signing Visit   Please refer to the [facility] ultrasound report in Ob Procedures for additional information regarding today's visit

## 2021-09-17 NOTE — PROGRESS NOTES
Please refer to the [facility] ultrasound report in Ob Procedures for additional information regarding today's visit

## 2021-09-19 LAB — MISCELLANEOUS LAB TEST RESULT: NORMAL

## 2021-09-23 ENCOUNTER — TELEPHONE (OUTPATIENT)
Dept: PERINATAL CARE | Facility: CLINIC | Age: 31
End: 2021-09-23

## 2021-09-23 NOTE — TELEPHONE ENCOUNTER
----- Message from Damien Huang MD sent at 9/23/2021  6:46 AM EDT -----   I reviewed the lab study today and the results revealed low risks for trisomy 21, 25, 15, and sex chromosome aneuploidies

## 2021-09-23 NOTE — TELEPHONE ENCOUNTER
Spoke with pt and provided her with the results of the [id number], gender NOT provided  PT instructed on MSAFP being ordered by OB and timing of test   PT receptive on information and declines questions at this time

## 2021-10-10 PROBLEM — O09.812 ENCOUNTER FOR SUPERVISION OF PREGNANCY RESULTING FROM ASSISTED REPRODUCTIVE TECHNOLOGY IN SECOND TRIMESTER, ANTEPARTUM: Status: ACTIVE | Noted: 2021-09-07

## 2021-10-10 PROBLEM — Z3A.17 17 WEEKS GESTATION OF PREGNANCY: Status: ACTIVE | Noted: 2021-09-07

## 2021-10-11 ENCOUNTER — APPOINTMENT (OUTPATIENT)
Dept: LAB | Facility: HOSPITAL | Age: 31
End: 2021-10-11
Payer: COMMERCIAL

## 2021-10-11 ENCOUNTER — ROUTINE PRENATAL (OUTPATIENT)
Dept: OBGYN CLINIC | Facility: CLINIC | Age: 31
End: 2021-10-11

## 2021-10-11 VITALS — WEIGHT: 146 LBS | DIASTOLIC BLOOD PRESSURE: 68 MMHG | BODY MASS INDEX: 25.06 KG/M2 | SYSTOLIC BLOOD PRESSURE: 120 MMHG

## 2021-10-11 DIAGNOSIS — Z34.92 SECOND TRIMESTER PREGNANCY: ICD-10-CM

## 2021-10-11 DIAGNOSIS — Z3A.17 17 WEEKS GESTATION OF PREGNANCY: ICD-10-CM

## 2021-10-11 DIAGNOSIS — Z3A.17 17 WEEKS GESTATION OF PREGNANCY: Primary | ICD-10-CM

## 2021-10-11 DIAGNOSIS — O09.812 ENCOUNTER FOR SUPERVISION OF PREGNANCY RESULTING FROM ASSISTED REPRODUCTIVE TECHNOLOGY IN SECOND TRIMESTER, ANTEPARTUM: ICD-10-CM

## 2021-10-11 PROCEDURE — 36415 COLL VENOUS BLD VENIPUNCTURE: CPT

## 2021-10-11 PROCEDURE — PNV: Performed by: ADVANCED PRACTICE MIDWIFE

## 2021-10-11 PROCEDURE — 82105 ALPHA-FETOPROTEIN SERUM: CPT

## 2021-10-13 LAB
2ND TRIMESTER 4 SCREEN SERPL-IMP: NORMAL
AFP ADJ MOM SERPL: 0.44
AFP INTERP AMN-IMP: NORMAL
AFP INTERP SERPL-IMP: NORMAL
AFP INTERP SERPL-IMP: NORMAL
AFP SERPL-MCNC: 42.6 NG/ML
AGE AT DELIVERY: 30.6 YR
GA METHOD: NORMAL
GA: 23.9 WEEKS
IDDM PATIENT QL: NO
MULTIPLE PREGNANCY: NO
NEURAL TUBE DEFECT RISK FETUS: NORMAL %

## 2021-10-29 ENCOUNTER — TELEPHONE (OUTPATIENT)
Dept: OBGYN CLINIC | Facility: MEDICAL CENTER | Age: 31
End: 2021-10-29

## 2021-10-29 PROBLEM — Z00.00 WELL ADULT EXAM: Status: RESOLVED | Noted: 2021-05-18 | Resolved: 2021-10-29

## 2021-10-29 PROBLEM — Z3A.20 20 WEEKS GESTATION OF PREGNANCY: Status: ACTIVE | Noted: 2021-09-07

## 2021-10-30 ENCOUNTER — TELEPHONE (OUTPATIENT)
Dept: LABOR AND DELIVERY | Facility: HOSPITAL | Age: 31
End: 2021-10-30

## 2021-10-30 ENCOUNTER — NURSE TRIAGE (OUTPATIENT)
Dept: OTHER | Facility: OTHER | Age: 31
End: 2021-10-30

## 2021-10-30 DIAGNOSIS — O21.9 NAUSEA/VOMITING IN PREGNANCY: Primary | ICD-10-CM

## 2021-10-30 RX ORDER — DOXYLAMINE SUCCINATE AND PYRIDOXINE HYDROCHLORIDE 20; 20 MG/1; MG/1
1 TABLET, EXTENDED RELEASE ORAL DAILY
Qty: 30 TABLET | Refills: 1 | Status: SHIPPED | OUTPATIENT
Start: 2021-10-30 | End: 2021-12-29 | Stop reason: SDUPTHER

## 2021-11-02 ENCOUNTER — ROUTINE PRENATAL (OUTPATIENT)
Dept: PERINATAL CARE | Facility: OTHER | Age: 31
End: 2021-11-02
Payer: COMMERCIAL

## 2021-11-02 VITALS
HEIGHT: 64 IN | BODY MASS INDEX: 25.54 KG/M2 | SYSTOLIC BLOOD PRESSURE: 144 MMHG | DIASTOLIC BLOOD PRESSURE: 76 MMHG | HEART RATE: 86 BPM | WEIGHT: 149.6 LBS

## 2021-11-02 DIAGNOSIS — O10.919 CHRONIC HYPERTENSION AFFECTING PREGNANCY: ICD-10-CM

## 2021-11-02 DIAGNOSIS — Z36.86 ENCOUNTER FOR ANTENATAL SCREENING FOR CERVICAL LENGTH: Primary | ICD-10-CM

## 2021-11-02 DIAGNOSIS — Z36.89 ENCOUNTER FOR FETAL ANATOMIC SURVEY: ICD-10-CM

## 2021-11-02 DIAGNOSIS — Z3A.20 20 WEEKS GESTATION OF PREGNANCY: ICD-10-CM

## 2021-11-02 PROBLEM — R03.0 ELEVATED BLOOD PRESSURE READING WITHOUT DIAGNOSIS OF HYPERTENSION: Status: ACTIVE | Noted: 2021-11-02

## 2021-11-02 PROCEDURE — 76805 OB US >/= 14 WKS SNGL FETUS: CPT | Performed by: OBSTETRICS & GYNECOLOGY

## 2021-11-02 PROCEDURE — 99213 OFFICE O/P EST LOW 20 MIN: CPT | Performed by: OBSTETRICS & GYNECOLOGY

## 2021-11-02 PROCEDURE — 76817 TRANSVAGINAL US OBSTETRIC: CPT | Performed by: OBSTETRICS & GYNECOLOGY

## 2021-11-02 RX ORDER — FERROUS SULFATE 325(65) MG
325 TABLET ORAL
COMMUNITY
End: 2022-05-20 | Stop reason: ALTCHOICE

## 2021-11-08 ENCOUNTER — ROUTINE PRENATAL (OUTPATIENT)
Dept: OBGYN CLINIC | Facility: MEDICAL CENTER | Age: 31
End: 2021-11-08

## 2021-11-08 VITALS — WEIGHT: 154 LBS | BODY MASS INDEX: 26.43 KG/M2

## 2021-11-08 DIAGNOSIS — O09.812 ENCOUNTER FOR SUPERVISION OF PREGNANCY RESULTING FROM ASSISTED REPRODUCTIVE TECHNOLOGY IN SECOND TRIMESTER, ANTEPARTUM: ICD-10-CM

## 2021-11-08 DIAGNOSIS — Z3A.21 21 WEEKS GESTATION OF PREGNANCY: Primary | ICD-10-CM

## 2021-11-08 DIAGNOSIS — O10.919 CHRONIC HYPERTENSION AFFECTING PREGNANCY: ICD-10-CM

## 2021-11-08 PROCEDURE — PNV: Performed by: STUDENT IN AN ORGANIZED HEALTH CARE EDUCATION/TRAINING PROGRAM

## 2021-11-26 ENCOUNTER — ROUTINE PRENATAL (OUTPATIENT)
Dept: PERINATAL CARE | Facility: OTHER | Age: 31
End: 2021-11-26
Payer: COMMERCIAL

## 2021-11-26 VITALS
HEART RATE: 101 BPM | DIASTOLIC BLOOD PRESSURE: 83 MMHG | HEIGHT: 64 IN | WEIGHT: 158.6 LBS | SYSTOLIC BLOOD PRESSURE: 125 MMHG | BODY MASS INDEX: 27.08 KG/M2

## 2021-11-26 DIAGNOSIS — Z3A.23 23 WEEKS GESTATION OF PREGNANCY: ICD-10-CM

## 2021-11-26 DIAGNOSIS — O09.812 ENCOUNTER FOR SUPERVISION OF PREGNANCY RESULTING FROM ASSISTED REPRODUCTIVE TECHNOLOGY IN SECOND TRIMESTER, ANTEPARTUM: Primary | ICD-10-CM

## 2021-11-26 DIAGNOSIS — O10.919 CHRONIC HYPERTENSION AFFECTING PREGNANCY: ICD-10-CM

## 2021-11-26 PROCEDURE — 99214 OFFICE O/P EST MOD 30 MIN: CPT | Performed by: OBSTETRICS & GYNECOLOGY

## 2021-11-26 PROCEDURE — 76825 ECHO EXAM OF FETAL HEART: CPT | Performed by: OBSTETRICS & GYNECOLOGY

## 2021-11-26 PROCEDURE — 76827 ECHO EXAM OF FETAL HEART: CPT | Performed by: OBSTETRICS & GYNECOLOGY

## 2021-11-26 PROCEDURE — 93325 DOPPLER ECHO COLOR FLOW MAPG: CPT | Performed by: OBSTETRICS & GYNECOLOGY

## 2021-11-26 RX ORDER — FAMOTIDINE 10 MG
10 TABLET ORAL 2 TIMES DAILY PRN
COMMUNITY
End: 2022-03-16 | Stop reason: HOSPADM

## 2021-12-07 ENCOUNTER — ROUTINE PRENATAL (OUTPATIENT)
Dept: OBGYN CLINIC | Facility: MEDICAL CENTER | Age: 31
End: 2021-12-07

## 2021-12-07 VITALS — DIASTOLIC BLOOD PRESSURE: 76 MMHG | BODY MASS INDEX: 27.81 KG/M2 | WEIGHT: 162 LBS | SYSTOLIC BLOOD PRESSURE: 120 MMHG

## 2021-12-07 DIAGNOSIS — Z3A.25 25 WEEKS GESTATION OF PREGNANCY: Primary | ICD-10-CM

## 2021-12-07 DIAGNOSIS — R12 HEARTBURN DURING PREGNANCY IN THIRD TRIMESTER: ICD-10-CM

## 2021-12-07 DIAGNOSIS — O26.893 HEARTBURN DURING PREGNANCY IN THIRD TRIMESTER: ICD-10-CM

## 2021-12-07 DIAGNOSIS — O10.919 CHRONIC HYPERTENSION AFFECTING PREGNANCY: ICD-10-CM

## 2021-12-07 DIAGNOSIS — O09.812 ENCOUNTER FOR SUPERVISION OF PREGNANCY RESULTING FROM ASSISTED REPRODUCTIVE TECHNOLOGY IN SECOND TRIMESTER, ANTEPARTUM: ICD-10-CM

## 2021-12-07 PROCEDURE — PNV: Performed by: OBSTETRICS & GYNECOLOGY

## 2021-12-07 RX ORDER — LANSOPRAZOLE 30 MG/1
30 CAPSULE, DELAYED RELEASE ORAL DAILY
Qty: 30 CAPSULE | Refills: 3 | Status: SHIPPED | OUTPATIENT
Start: 2021-12-07 | End: 2022-02-01

## 2022-01-04 ENCOUNTER — ROUTINE PRENATAL (OUTPATIENT)
Dept: OBGYN CLINIC | Facility: MEDICAL CENTER | Age: 32
End: 2022-01-04
Payer: COMMERCIAL

## 2022-01-04 VITALS — WEIGHT: 166 LBS | BODY MASS INDEX: 28.49 KG/M2 | DIASTOLIC BLOOD PRESSURE: 80 MMHG | SYSTOLIC BLOOD PRESSURE: 115 MMHG

## 2022-01-04 DIAGNOSIS — Z3A.29 29 WEEKS GESTATION OF PREGNANCY: Primary | ICD-10-CM

## 2022-01-04 DIAGNOSIS — O10.919 CHRONIC HYPERTENSION AFFECTING PREGNANCY: ICD-10-CM

## 2022-01-04 DIAGNOSIS — Z23 NEED FOR TDAP VACCINATION: ICD-10-CM

## 2022-01-04 DIAGNOSIS — O09.813 SUPERVISION OF PREGNANCY RESULTING FROM ASSISTED REPRODUCTIVE TECHNOLOGY IN THIRD TRIMESTER: ICD-10-CM

## 2022-01-04 LAB
BASOPHILS # BLD AUTO: 0.06 THOUSANDS/ΜL (ref 0–0.1)
BASOPHILS NFR BLD AUTO: 1 % (ref 0–1)
EOSINOPHIL # BLD AUTO: 0.06 THOUSAND/ΜL (ref 0–0.61)
EOSINOPHIL NFR BLD AUTO: 1 % (ref 0–6)
ERYTHROCYTE [DISTWIDTH] IN BLOOD BY AUTOMATED COUNT: 13.2 % (ref 11.6–15.1)
GLUCOSE 1H P 50 G GLC PO SERPL-MCNC: 143 MG/DL (ref 40–134)
HCT VFR BLD AUTO: 39.5 % (ref 34.8–46.1)
HGB BLD-MCNC: 12.4 G/DL (ref 11.5–15.4)
IMM GRANULOCYTES # BLD AUTO: 0.25 THOUSAND/UL (ref 0–0.2)
IMM GRANULOCYTES NFR BLD AUTO: 2 % (ref 0–2)
LYMPHOCYTES # BLD AUTO: 1.54 THOUSANDS/ΜL (ref 0.6–4.47)
LYMPHOCYTES NFR BLD AUTO: 14 % (ref 14–44)
MCH RBC QN AUTO: 31.7 PG (ref 26.8–34.3)
MCHC RBC AUTO-ENTMCNC: 31.4 G/DL (ref 31.4–37.4)
MCV RBC AUTO: 101 FL (ref 82–98)
MONOCYTES # BLD AUTO: 0.53 THOUSAND/ΜL (ref 0.17–1.22)
MONOCYTES NFR BLD AUTO: 5 % (ref 4–12)
NEUTROPHILS # BLD AUTO: 8.32 THOUSANDS/ΜL (ref 1.85–7.62)
NEUTS SEG NFR BLD AUTO: 77 % (ref 43–75)
NRBC BLD AUTO-RTO: 0 /100 WBCS
PLATELET # BLD AUTO: 282 THOUSANDS/UL (ref 149–390)
PMV BLD AUTO: 10.4 FL (ref 8.9–12.7)
RBC # BLD AUTO: 3.91 MILLION/UL (ref 3.81–5.12)
WBC # BLD AUTO: 10.76 THOUSAND/UL (ref 4.31–10.16)

## 2022-01-04 PROCEDURE — 36415 COLL VENOUS BLD VENIPUNCTURE: CPT | Performed by: STUDENT IN AN ORGANIZED HEALTH CARE EDUCATION/TRAINING PROGRAM

## 2022-01-04 PROCEDURE — 90715 TDAP VACCINE 7 YRS/> IM: CPT | Performed by: STUDENT IN AN ORGANIZED HEALTH CARE EDUCATION/TRAINING PROGRAM

## 2022-01-04 PROCEDURE — 85025 COMPLETE CBC W/AUTO DIFF WBC: CPT | Performed by: STUDENT IN AN ORGANIZED HEALTH CARE EDUCATION/TRAINING PROGRAM

## 2022-01-04 PROCEDURE — PNV: Performed by: STUDENT IN AN ORGANIZED HEALTH CARE EDUCATION/TRAINING PROGRAM

## 2022-01-04 PROCEDURE — 90471 IMMUNIZATION ADMIN: CPT | Performed by: STUDENT IN AN ORGANIZED HEALTH CARE EDUCATION/TRAINING PROGRAM

## 2022-01-04 PROCEDURE — 82950 GLUCOSE TEST: CPT | Performed by: STUDENT IN AN ORGANIZED HEALTH CARE EDUCATION/TRAINING PROGRAM

## 2022-01-04 NOTE — PROGRESS NOTES
Routine Prenatal Visit  [facility] [address] [place] [address]    Assessment/Plan:  Fabby Kramer is a 28y o  year old  at 28w2d who presents for routine prenatal visit  1  29 weeks gestation of pregnancy  Assessment & Plan:  - Glucose tolerance test and CBC collected in the office today  - Delivery consent discussed in detail and signed in the office today  - Patient was given [facility] Birth Plan Form, list of pediatric providers, and fetal kick count education  - Tdap given      Orders:  -     Glucose, 1H PG  -     CBC and differential  -     Anemia Panel w/Reflex, OB    2  Chronic hypertension affecting pregnancy  Assessment & Plan:  - Based on mild range blood pressures in the first trimester  Well controlled on no medications  3  Supervision of pregnancy resulting from assisted reproductive technology in third trimester  Assessment & Plan:  - Normal fetal echocardiogram  - Serial growth ultrasounds recommended by MFM  - Weekly NSTs recommended by MFM starting at 36 weeks          Subjective:     CC: Prenatal care    Alma Rosa Rodriguez is a 28 y o   female who presents for routine prenatal care at 29w3d  Pregnancy ROS: Denies leakage of fluid, pelvic pain, or vaginal bleeding  Reports normal fetal movement  Having occasional dyspnea of pregnancy and restless legs      The following portions of the patient's history were reviewed and updated as appropriate: allergies, current medications, past family history, past medical history, obstetric history, gynecologic history, past social history, past surgical history and problem list       Objective:  /80   Wt 75 3 kg (166 lb)   BMI 28 49 kg/m²   Pregravid Weight/BMI: 59 9 kg (132 lb) (BMI 22 65)  Current Weight: 75 3 kg (166 lb)   Total Weight Gain: 15 4 kg (34 lb)   Pre- Vitals      Most Recent Value   Prenatal Assessment    Fetal Heart Rate 140   Movement Present   Prenatal Vitals    Blood Pressure 115/80   Weight - Scale 75 3 kg (166 lb)   Urine Albumin/Glucose    Dilation/Effacement/Station    Vaginal Drainage    Edema    LLE Edema None   RLE Edema None           General: Well appearing, no distress  Respiratory: Unlabored breathing  Cardiovascular: Regular rate  Abdomen: Soft, gravid, nontender  Fundal Height: Appropriate for gestational age  Extremities: Warm and well perfused  Non tender      Megan Adorno MD  [address] [place] [address]  1/4/2022 8:32 AM

## 2022-01-04 NOTE — ASSESSMENT & PLAN NOTE
- Glucose tolerance test and CBC collected in the office today  - Delivery consent discussed in detail and signed in the office today  - Patient was given Ashish Fraser, list of pediatric providers, and fetal kick count education  - Tdap given

## 2022-01-04 NOTE — ASSESSMENT & PLAN NOTE
- Normal fetal echocardiogram  - Serial growth ultrasounds recommended by MFM  - Weekly NSTs recommended by M starting at 36 weeks

## 2022-01-04 NOTE — PATIENT INSTRUCTIONS
Dear Shade Watson,    It was a pleasure taking care of you today  I appreciate you and welcome your feedback  If you receive a survey from [employer], please take a few moments to let me know how I am doing      Sincerely,  Dr Jeremy Durand  [phone]

## 2022-01-12 ENCOUNTER — TELEPHONE (OUTPATIENT)
Dept: OBGYN CLINIC | Facility: MEDICAL CENTER | Age: 32
End: 2022-01-12

## 2022-01-13 ENCOUNTER — APPOINTMENT (OUTPATIENT)
Dept: LAB | Facility: HOSPITAL | Age: 32
End: 2022-01-13
Attending: STUDENT IN AN ORGANIZED HEALTH CARE EDUCATION/TRAINING PROGRAM
Payer: COMMERCIAL

## 2022-01-13 DIAGNOSIS — Z3A.29 29 WEEKS GESTATION OF PREGNANCY: ICD-10-CM

## 2022-01-13 LAB
GLUCOSE 1H P 100 G GLC PO SERPL-MCNC: 197 MG/DL (ref 65–179)
GLUCOSE 2H P 100 G GLC PO SERPL-MCNC: 162 MG/DL (ref 65–154)
GLUCOSE 3H P 100 G GLC PO SERPL-MCNC: 151 MG/DL (ref 65–139)
GLUCOSE P FAST SERPL-MCNC: 90 MG/DL (ref 65–99)

## 2022-01-13 PROCEDURE — 82951 GLUCOSE TOLERANCE TEST (GTT): CPT

## 2022-01-13 PROCEDURE — 82952 GTT-ADDED SAMPLES: CPT

## 2022-01-13 PROCEDURE — 36415 COLL VENOUS BLD VENIPUNCTURE: CPT

## 2022-01-18 ENCOUNTER — ROUTINE PRENATAL (OUTPATIENT)
Dept: OBGYN CLINIC | Facility: MEDICAL CENTER | Age: 32
End: 2022-01-18

## 2022-01-18 VITALS — DIASTOLIC BLOOD PRESSURE: 75 MMHG | WEIGHT: 168 LBS | BODY MASS INDEX: 28.84 KG/M2 | SYSTOLIC BLOOD PRESSURE: 115 MMHG

## 2022-01-18 DIAGNOSIS — Z3A.31 31 WEEKS GESTATION OF PREGNANCY: ICD-10-CM

## 2022-01-18 DIAGNOSIS — Z34.93 THIRD TRIMESTER PREGNANCY: Primary | ICD-10-CM

## 2022-01-18 DIAGNOSIS — O09.813 SUPERVISION OF PREGNANCY RESULTING FROM ASSISTED REPRODUCTIVE TECHNOLOGY IN THIRD TRIMESTER: ICD-10-CM

## 2022-01-18 DIAGNOSIS — O10.919 CHRONIC HYPERTENSION AFFECTING PREGNANCY: ICD-10-CM

## 2022-01-18 PROCEDURE — PNV: Performed by: OBSTETRICS & GYNECOLOGY

## 2022-01-18 NOTE — PROGRESS NOTES
Assessment  28 y o   at 31w3d presenting for routine prenatal visit  Plan  Diagnoses and all orders for this visit:    Third trimester pregnancy  31 weeks gestation of pregnancy  -  labor precautions  - FKC  - Discussed normal aches and pains of pregnancy  - Return in 2wks for PN    Chronic hypertension affecting pregnancy  - Continue ASA  - Continue routine BP and symptom monitoring    Supervision of pregnancy resulting from assisted reproductive technology in third trimester  - Follows with MFM  - Continue growth surveillance       ____________________________________________________________        Subjective    Zeny Figueredo is a 28 y o   at 31w3d who presents for routine prenatal visit  She is reporting 1 day with back cramping  Lasted about 2hr before resolving  Also had 1d of suprapubic pain, described like a deep bruise or having been kicked in this area  Not present this AM  Denies contractions, loss of fluid, or vaginal bleeding  She feels regular fetal movements  Pregnancy Problems:  Patient Active Problem List   Diagnosis    Anxiety    Supervision of pregnancy resulting from assisted reproductive technology in third trimester    31 weeks gestation of pregnancy    Chronic hypertension affecting pregnancy         Objective  /75   Wt 76 2 kg (168 lb)   BMI 28 84 kg/m²     FHT: 154 BPM   Uterine Size: 31 cm     Physical Exam:  Physical Exam  Constitutional:       General: She is not in acute distress  Appearance: Normal appearance  She is well-developed  She is not ill-appearing, toxic-appearing or diaphoretic  HENT:      Head: Normocephalic and atraumatic  Eyes:      General: No scleral icterus  Right eye: No discharge  Left eye: No discharge  Conjunctiva/sclera: Conjunctivae normal    Pulmonary:      Effort: Pulmonary effort is normal  No accessory muscle usage or respiratory distress  Abdominal:      General: There is distension (gravid)  Tenderness: There is no abdominal tenderness  There is no guarding or rebound  Comments: No tenderness to palpation over suprapubic area  No pelvic girdle instability or pubic symphysis laxity noted  Skin:     General: Skin is warm and dry  Coloration: Skin is not jaundiced  Findings: No bruising, erythema or rash  Neurological:      Mental Status: She is alert  Psychiatric:         Mood and Affect: Mood normal          Behavior: Behavior normal          Thought Content:  Thought content normal          Judgment: Judgment normal

## 2022-01-19 ENCOUNTER — TELEPHONE (OUTPATIENT)
Dept: PERINATAL CARE | Facility: CLINIC | Age: 32
End: 2022-01-19

## 2022-01-19 PROBLEM — O10.913 MATERNAL CHRONIC HYPERTENSION, THIRD TRIMESTER: Status: ACTIVE | Noted: 2022-01-19

## 2022-01-19 PROBLEM — O09.813 PREGNANCY RESULTING FROM IN VITRO FERTILIZATION, THIRD TRIMESTER: Status: ACTIVE | Noted: 2022-01-19

## 2022-01-19 NOTE — PROGRESS NOTES
Please refer to the [facility] ultrasound report in Ob Procedures for additional information regarding today's visit

## 2022-01-20 ENCOUNTER — ULTRASOUND (OUTPATIENT)
Dept: PERINATAL CARE | Facility: OTHER | Age: 32
End: 2022-01-20
Payer: COMMERCIAL

## 2022-01-20 VITALS
SYSTOLIC BLOOD PRESSURE: 138 MMHG | HEART RATE: 99 BPM | DIASTOLIC BLOOD PRESSURE: 82 MMHG | WEIGHT: 166.6 LBS | BODY MASS INDEX: 28.44 KG/M2 | HEIGHT: 64 IN

## 2022-01-20 DIAGNOSIS — O09.813 PREGNANCY RESULTING FROM IN VITRO FERTILIZATION, THIRD TRIMESTER: ICD-10-CM

## 2022-01-20 DIAGNOSIS — O10.913 MATERNAL CHRONIC HYPERTENSION, THIRD TRIMESTER: ICD-10-CM

## 2022-01-20 DIAGNOSIS — Z3A.31 31 WEEKS GESTATION OF PREGNANCY: Primary | ICD-10-CM

## 2022-01-20 DIAGNOSIS — O24.410 DIET CONTROLLED GESTATIONAL DIABETES MELLITUS (GDM) IN THIRD TRIMESTER: ICD-10-CM

## 2022-01-20 PROCEDURE — 99213 OFFICE O/P EST LOW 20 MIN: CPT | Performed by: OBSTETRICS & GYNECOLOGY

## 2022-01-20 PROCEDURE — 76816 OB US FOLLOW-UP PER FETUS: CPT | Performed by: OBSTETRICS & GYNECOLOGY

## 2022-01-20 NOTE — PATIENT INSTRUCTIONS
Kick Counts in Pregnancy   WHAT YOU NEED TO KNOW:   Kick counts measure how much your baby is moving in your womb  A kick from your baby can be felt as a twist, turn, swish, roll, or jab  It is common to feel your baby kicking at 26 to 28 weeks of pregnancy  You may feel your baby kick as early as 20 weeks of pregnancy  You may want to start counting at 28 weeks  DISCHARGE INSTRUCTIONS:   Contact your doctor immediately if:   · You feel a change in the number of kicks or movements of your baby  · You feel fewer than 10 kicks within 2 hours  · You have questions or concerns about your baby's movements  Why measure kick counts:  Your baby's movement may provide information about your baby's health  He or she may move less, or not at all, if there are problems  Your baby may move less if he or she is not getting enough oxygen or nutrition from the placenta  Do not smoke while you are pregnant  Smoking decreases the amount of oxygen that gets to your baby  Talk to your healthcare provider if you need help to quit smoking  Tell your healthcare provider as soon as you feel a change in your baby's movements  When to measure kick counts:   · Measure kick counts at the same time every day  · Measure kick counts when your baby is awake and most active  Your baby may be most active in the evening  How to measure kick counts:  Check that your baby is awake before you measure kick counts  You can wake up your baby by lightly pushing on your belly, walking, or drinking something cold  Your healthcare provider may tell you different ways to measure kick counts  You may be told to do the following:  · Use a chart or clock to keep track of the time you start and finish counting  · Sit in a chair or lie on your left side  · Place your hands on the largest part of your belly  · Count until you reach 10 kicks  Write down how much time it takes to count 10 kicks       · It may take 30 minutes to 2 hours to count 10 kicks  It should not take more than 2 hours to count 10 kicks  Follow up with your doctor as directed:  Write down your questions so you remember to ask them during your visits  © Copyright [employer] 2021 Information is for End User's use only and may not be sold, redistributed or otherwise used for commercial purposes  All illustrations and images included in CareNotes® are the copyrighted property of A D A M , Inc  or [facility] St[facility] Cross   The above information is an  only  It is not intended as medical advice for individual conditions or treatments  Talk to your doctor, nurse or pharmacist before following any medical regimen to see if it is safe and effective for you

## 2022-01-20 NOTE — LETTER
January 20, 2022     VIJAYA Soriano  [address]  [address]    Patient: Misti Warren   YOB: 1990   Date of Visit: 1/20/2022       Dear Dr Vanessa Lancaster: Thank you for referring Misti Warren to me for evaluation  Below are my notes for this consultation  If you have questions, please do not hesitate to call me  I look forward to following your patient along with you  Sincerely,        Maria Neville MD        CC: No Recipients  Maria Neville MD  1/19/2022  5:24 PM  Sign when Signing Visit  Please refer to the [facility] ultrasound report in Ob Procedures for additional information regarding today's visit

## 2022-01-27 ENCOUNTER — TELEMEDICINE (OUTPATIENT)
Dept: PERINATAL CARE | Facility: CLINIC | Age: 32
End: 2022-01-27
Payer: COMMERCIAL

## 2022-01-27 ENCOUNTER — DOCUMENTATION (OUTPATIENT)
Dept: PERINATAL CARE | Facility: CLINIC | Age: 32
End: 2022-01-27

## 2022-01-27 DIAGNOSIS — O24.419 GESTATIONAL DIABETES MELLITUS (GDM) IN THIRD TRIMESTER, GESTATIONAL DIABETES METHOD OF CONTROL UNSPECIFIED: Primary | ICD-10-CM

## 2022-01-27 DIAGNOSIS — Z3A.32 32 WEEKS GESTATION OF PREGNANCY: ICD-10-CM

## 2022-01-27 DIAGNOSIS — O10.919 CHRONIC HYPERTENSION AFFECTING PREGNANCY: ICD-10-CM

## 2022-01-27 PROCEDURE — G0108 DIAB MANAGE TRN  PER INDIV: HCPCS

## 2022-01-27 RX ORDER — LANCETS
EACH MISCELLANEOUS
Qty: 100 EACH | Refills: 3 | Status: SHIPPED | OUTPATIENT
Start: 2022-01-27 | End: 2022-03-27

## 2022-01-27 RX ORDER — BLOOD SUGAR DIAGNOSTIC
STRIP MISCELLANEOUS
Qty: 100 STRIP | Refills: 3 | Status: SHIPPED | OUTPATIENT
Start: 2022-01-27 | End: 2022-03-16 | Stop reason: HOSPADM

## 2022-01-27 RX ORDER — BLOOD-GLUCOSE METER
EACH MISCELLANEOUS
Qty: 1 KIT | Refills: 0 | Status: SHIPPED | OUTPATIENT
Start: 2022-01-27 | End: 2022-03-27

## 2022-01-27 NOTE — PROGRESS NOTES
Virtual Regular Visit    Verification of patient location:    Patient is located in the following state in which I hold an active license [place]      Assessment/Plan:    Problem List Items Addressed This Visit        Cardiovascular and Mediastinum    Chronic hypertension affecting pregnancy       Other    31 weeks gestation of pregnancy      Other Visit Diagnoses     Gestational diabetes mellitus (GDM) in third trimester, gestational diabetes method of control unspecified    -  Primary               Reason for visit is   Chief Complaint   Patient presents with    Gestational Diabetes    Patient Education    Virtual Regular Visit        Encounter provider Gerald Huang    Provider located at [address] [place] [id number] [phone]      Recent Visits  No visits were found meeting these conditions  Showing recent visits within past 7 days and meeting all other requirements  Today's Visits  Date Type Provider Dept   01/27/22 1401 [address]   Showing today's visits and meeting all other requirements  Future Appointments  No visits were found meeting these conditions  Showing future appointments within next 150 days and meeting all other requirements       The patient was identified by name and date of birth  Misti Warren was informed that this is a telemedicine visit and that the visit is being conducted through [address] [facility] [facility] [address] [facility] and patient was informed that this is a secure, HIPAA-compliant platform  She agrees to proceed     My office door was closed  No one else was in the room  She acknowledged consent and understanding of privacy and security of the video platform  The patient has agreed to participate and understands they can discontinue the visit at any time  Patient is aware this is a billable service  Subjective  Misti Warren is a 28 y  o pregnant female         HPI     Past Medical History:   Diagnosis Date    Abnormal Pap smear of cervix     Chronic hypertension affecting pregnancy     Female infertility     Gestational diabetes     HPV in female     Kidney stone     Varicella        Past Surgical History:   Procedure Laterality Date    COLPOSCOPY      EXPLORATORY LAPAROTOMY      HYSTEROSCOPY W/ POLYPECTOMY      KIDNEY STONE SURGERY      TONSILLECTOMY      WISDOM TOOTH EXTRACTION         Current Outpatient Medications   Medication Sig Dispense Refill    Doxylamine-Pyridoxine ER (Bonjesta) 20-20 MG TBCR Take 1 tablet by mouth daily (Patient taking differently: Take 1 tablet by mouth as needed  ) 30 tablet 2    famotidine (PEPCID) 10 mg tablet Take 10 mg by mouth 2 (two) times a day as needed for heartburn      ferrous sulfate 325 (65 Fe) mg tablet Take 325 mg by mouth daily with breakfast      Prenatal MV-Min-Fe Fum-FA-DHA (PRENATAL 1 PO) Take 1 tablet by mouth      aspirin 81 mg chewable tablet Chew 2 tablets (162 mg total) daily 180 tablet 1    lansoprazole (PREVACID) 30 mg capsule Take 1 capsule (30 mg total) by mouth daily (Patient not taking: Reported on 1/20/2022 ) 30 capsule 3     No current facility-administered medications for this visit  Allergies   Allergen Reactions    Sulfamethoxazole-Trimethoprim Rash       Review of Systems    Video Exam    There were no vitals filed for this visit  Physical Exam     I spent 60 minutes with patient today in which greater than 50% of the time was spent in counseling/coordination of care regarding gestational diabetes  VIRTUAL VISIT DISCLAIMER      Jaylene Epps verbally agrees to participate in [employer]  Pt is aware that [employer] could be limited without vital signs or the ability to perform a full hands-on physical Jethro Adorno understands she or the provider may request at any time to terminate the video visit and request the patient to seek care or treatment in person          Thank you for referring your patient to [facility] Diabetes in Pregnancy Program      Momo Walker is a  28 y o  female who presents today for Class 1  Patient is at 32w5d gestation, Estimated Date of Delivery: 3/19/22  Reviewed and updated the following from patients medical record: PMH, Problem List, Allergies, and Current Medications  Visit Diagnosis:  GDM in pregnancy method of control unspecified    Discussed with patient pathophysiology of GDM, untreated hyperglycemia in pregnancy and maternal fetal complications including fetal macrosomia,  hypoglycemia, polyhydramnios, increased incidence of  section,  labor, and in severe cases fetal demise and still birth   Discussed importance of blood glucose monitoring, nutrition, and medication if necessary in achieving BG goals       Additional Pregnancy Complications:  None    Labs:    Lab Results   Component Value Date    HTL1EBTT57DH 143 (H) 2022       Lab Results   Component Value Date    GLUF 90 2022    [id number] 197 (H) 2022    APQADFH0EX 162 (H) 2022    LLTZDQK1BC 151 (H) 2022        No components found for: HGA1C    Medications:  No diabetes related medications    Anthropometrics:  Ht Readings from Last 3 Encounters:   22 5' 4" (1 626 m)   21 5' 4" (1 626 m)   21 5' 4" (1 626 m)     Wt Readings from Last 3 Encounters:   22 75 6 kg (166 lb 9 6 oz)   22 76 2 kg (168 lb)   22 75 3 kg (166 lb)     Pre-gravid weight: 59 9 kg (132 lb)  Pre-gravid BMI: 22 65  Weight Change: 15 7 kg (34 lb 9 6 oz)  Weight gain recommendations: BMI (18 5-24 9) 25-35 lbs  Comments: Encouraged patient to maintain weight during the remainder of pregnancy    Recent Ultra Sound Results:  Date: 22  Fetal Growth: Normal  CONG: Normal  Next US date: 2/15/22    Blood Glucose Monitoring:   Glucose Meter: Contour Next EZ  Instructed on testing blood sugars: 4 x per day (Fasting, 2 hour after start of each meal)    Gave instruction on site selection, skin preparation, loading strips and lancet device, meter activation, obtaining blood sample, test strip and lancet disposal and storage, and recording log book entries  Patient has good understanding of material covered and was able to test their own blood sugar in office today  Instruction for reporting blood sugar results weekly via:  Phone: [phone]   OR  My Chart (Message with image attachment, or Glucose Flowsheet)    Goal Blood Sugar Ranges:   Fastin-90 mg/dL  1 hour after the start of each meal: 140 mg/dL or less  2 hours after start of each meal: 120 mg/dL or less    Meal Plan (daily calorie and protein needs):  Calories: 2100 calorie    Type of Diet:Regular  Additional Nutrition Concerns: none    Meal Plan Tips:  1  Patient was provided with a meal plan including 3 meals and 3 snacks  2  Discussed appropriate amounts of CHO, PRO, and Fat at each meal and snack  3  Reviewed CHO exchange list, and portion sizes for both CHO and PRO via food models  4  Instruction on how to read a food label  5  Provided suggested meal/snack options to increase nutrition and maintain consistent meal and snack intakes  6  Instructed on how to keep a 3-day food diary to be brought to follow- up appointment  7  Encouraged  patient to eat every 2 0-3 5 hours while awake  8  Encouraged patient to go no longer than 8-10 hours fasting overnight until first meal of the day  Physical Activity:  Discussed benefits of physical activity to optimize blood glucose control, encouraged activity at patient is physically able  Always consult a physician prior to starting an exercise program  Recommend 20-30 minutes daily      Patient Stated Goal: "I will eat 3 meals and 3 snacks each day, including protein at each"    Diabetes Self Management Support Plan outside of ongoing care: Spouse/Family    Learner/s Present:Learners Present: Patient   Barriers to Learning/Change: No Barriers  Expected Compliance: very good    Date to report blood sugars: Requested patient report blood sugars weekly on Wednesday or Thursday     Class 2 (date): 2/3/22    Begin Time: 11:00 am  End Time: 12:00 PM    It was a pleasure working with them today  Please feel free to call with any questions or concerns      Anastasia Montgomery RD,LDN,CDE  Diabetes Educator  [facility]  Diabetes in Pregnancy Program  [facility], [address] [place] [address],[address]  [address]

## 2022-01-27 NOTE — PROGRESS NOTES
Demographics:  Language: English  Ethnicity: White/   Country of Origin: [place]  Highest grade completed: Some College  Occupation: OtherMedical Assistant  Employer Name:[employer]  Hours worked per week: Full Time (40 hours/week)  Shift worked: Other (8 am to 4 pm)    Personal & Family History:  Personal history of diabetes? No  Family members with diabetes: Father  How many total pregnancies have you had? 1  Are you having swelling or fluid retention? no  Have you been placed on bedrest? no    Nutrition & Physical Activity:  Do you exercise? no  How many meals do you eat daily? 3  List times of meals: Breakfast: 6:30 am/ Lunch: noon/ Dinner: 6:00 PM  How many snacks do you eat daily? 2  List times of snacks: 10 am; 2:30 PM  What type of diet are you following at home? Regular  Do you have special Yarsanism or ethnic dietary preferences? no  Do you have any food allergies or intolerances? no  Do you receive WIC or food stamps? no    Learning preferences: How do you learn best? Interactive Small Groups  How do you rate your health? Good  Who is your primary support person? Spouse  How do you cope with stress? Walk or listen to music  How do you feel about being pregnant with diabetes? I am a little concerned per patient

## 2022-01-28 PROBLEM — Z3A.33 33 WEEKS GESTATION OF PREGNANCY: Status: ACTIVE | Noted: 2021-09-07

## 2022-01-28 PROBLEM — O99.810 ABNORMAL GLUCOSE TOLERANCE TEST (GTT) DURING PREGNANCY, ANTEPARTUM: Status: ACTIVE | Noted: 2022-01-28

## 2022-01-28 NOTE — PATIENT INSTRUCTIONS
Thank you for visiting [facility]  You may be invited to complete a survey about you visit  Your responses will help us improve care we provide  A 10 means the care you received at your visit met your expectations  If you are unable to give a 10 please list reasons so we can work on improving your patient experience  COVID-19 and Pregnancy   AMBULATORY CARE:   What you need to know about coronavirus disease 2019 (COVID-19) and pregnancy:  Pregnancy increases your risk for severe COVID-19 illness  COVID-19 can also lead to  delivery of your baby  Most babies who become infected with the new virus do not develop serious effects, but some do  It is important for you and your baby to stay safe during pregnancy and delivery  Signs and symptoms of COVID-19 in newborns: The following signs and symptoms may be from COVID-19, but they are also common in newborns  Your 's healthcare provider may recommend testing to confirm or rule out COVID-19  Your  may need a second test if the first is negative  · Fever    · Not moving arms or legs much, or being too sleepy to feed    · A runny nose or cough    · Fast breathing, or trouble breathing    · Vomiting, diarrhea, or not feeding well    If you think you, your baby, or someone in your home may be infected:  Do the following to protect others:  · If emergency care is needed,  tell the  about the possible infection, or call ahead and tell the emergency department  · Call a healthcare provider  for instructions if symptoms are mild  Anyone who may be infected should not  arrive without calling first  The provider will need to protect staff members and other patients  · The person who may be infected needs to wear a face covering  while getting medical care  This will help lower the risk of infecting others  Coverings are not used for anyone who is younger than 2 years, has breathing problems, or cannot remove it   The provider can give you instructions for anyone who cannot wear a covering  Call your local emergency number (911 in the [address]) or go to the emergency department if:   · You have trouble breathing or shortness of breath at rest     · You have chest pain or pressure that lasts longer than 5 minutes  · You become confused or hard to wake  · Your lips or face are blue  · You have a fever of 104°F (40°C) or higher  Call your doctor if:   · You have signs or symptoms of COVID-19  Try to call within 24 hours of when you start to feel sick  · You do not  have symptoms of COVID-19 but had close physical contact within 14 days with someone who tested positive  · You have questions or concerns about your condition or care  How the 2019 coronavirus spreads: The virus spreads quickly and easily  The virus can be passed starting 2 days before symptoms begin or before a positive test if symptoms never begin  The following are ways the virus is thought to spread, but more information may be coming:  · Droplets are the main way all coronaviruses spread  The virus travels in droplets that form when a person talks, coughs, or sneezes  The droplets can also float in the air for minutes or hours  Infection happens when you breathe in the droplets or get them in your eyes or nose  Close personal contact with an infected person increases your risk for infection  This means being within 6 feet (2 meters) of the person for at least 15 minutes over 24 hours  · Person-to-person contact can spread the virus  For example, a person with the virus on his or her hands can spread it by shaking hands with someone  · The virus can stay on objects and surfaces for a short time  You may become infected by touching the object or surface and then touching your eyes or mouth  · An infected animal may be able to infect a person who touches it  This may happen at live markets or on a farm      Protect yourself and your baby while you are pregnant: If you have COVID-19 during your pregnancy, healthcare providers will monitor you and your baby closely  Work with your healthcare provider or obstetrician  If you do not have either, experts recommend you contact a local [facility] health center or health department  The best way to prevent infection is to avoid anyone who is infected, but this can be hard to do  An infected person can spread the virus before signs or symptoms develop, or even if signs or symptoms never develop  The following can help keep you and your baby safe:     · Wash your hands throughout the day  Use soap and water  Rub your soapy hands together, lacing your fingers  Wash the front and back of each hand, and in between your fingers  Use the fingers of one hand to scrub under the fingernails of the other hand  Wash for at least 20 seconds  Rinse with warm, running water for several seconds  Dry your hands with a clean towel or paper towel  Use hand  that contains alcohol if soap and water are not available  If you must go out, wash your hands before you leave your home and when you get home  Wash your hands after you put items away  Be careful about what you touch while you are out  · Protect yourself from sneezes and coughs  Turn your face away and cover your mouth and nose if you are around someone who is sneezing or coughing  This helps protect you from the person's droplets  Cover your mouth and nose with a tissue when you need to sneeze or cough  Use the bend of your arm if you do not have a tissue  Throw the tissue away  Then wash your hands or use hand   · Make a habit of not touching your face  If you get the virus on your hands, you can transfer it to your eyes, nose, or mouth and become infected  · Follow worldwide, national, and local social distancing guidelines    Social distancing means staying far enough away physically from others that the virus cannot spread from one person to another  If you must go out, avoid crowds and large gatherings  Gatherings or crowds of 10 or more individuals can cause the virus to spread  Avoid places such as g[place], beaches, sporting events, and tourist attractions  For events such as parties, holiday meals, [employer] services, and conferences, attend virtually (on a computer), if possible  · Wear a face covering (mask) around anyone who does not live in your home  A covering helps protect the person wearing it from being infected or passing the virus to others  Do not  wear a plastic face shield instead of a covering  You can use both together for extra protection  Use a disposable non-medical mask, or make a cloth covering with at least 2 layers  You can also create layers by putting a cloth covering over a disposable non-medical mask  Cover your mouth and your nose  Securely fasten it under your chin and on the sides of your face  A face covering is not a substitute for other safety measures  Continue social distancing and washing your hands often  Do not put a face shield or covering on your   These increase the risk for sudden infant death syndrome (SIDS)  · Stay at least 6 feet (2 meters) away from anyone who does not live in your home  Keep this distance every time you go out of your home and are around another person  Do not shake hands with, hug, or kiss a person as a greeting  Stand or walk as far from others as possible, especially around anyone who is sneezing or coughing  If you must use public transportation (such as a bus or subway), try to sit or stand away from others  Do not go to someone else's home unless it is necessary  Do not go over to visit, even if you are lonely, or the person is  Only go if you need to help him or her  · Stay safe if you must go out to work  Keep physical distance between you and other workers as much as possible  Follow your employer's rules so everyone stays safe      · Clean and disinfect high-touch surfaces and objects in your home often  Use disinfecting wipes or make a solution of 4 teaspoons of bleach in 1 quart (4 cups) of water  Clean surfaces and objects in the room where your baby will be sleeping, especially right before you give birth  Wash your hands after you clean and disinfect  Be careful with cleaning products  Read the labels to make sure they are safe to use during pregnancy  Open windows to make sure you have good ventilation  What you can do to have a healthy pregnancy during the COVID-19 outbreak:       · Keep all prenatal and  appointments  You may be able to have certain prenatal appointments without having to go into the provider's office  Some providers offer phone, video, or other types of appointments  You may also be able to get prescriptions for a few months at a time  This will help lower the number of trips you need to make to the pharmacy for refills  If you do need to go into your provider's office, take precautions  Put a face covering on before you go into the office  Do not stand or sit within 6 feet (2 meters) of anyone in the waiting room, if possible  Do not stand or sit near anyone who is not wearing a face covering  · Get recommended vaccines  Your healthcare provider can tell you if you need vaccines not listed below, and when to get them  ? COVID-19 vaccines are recommended for use during pregnancy  Talk to your obstetrician or doctor about the risks and benefits of getting a COVID-19 vaccine during pregnancy  The current vaccines are given as a shot in 1 or 2 doses  A third dose is recommended for adults with a weakened immune system who get a 2-dose vaccine  The third dose is given at least 28 days after the second  Even after you get the vaccine, continue wearing a face covering, handwashing, and social distancing  These are still the best ways to prevent infection  ? Get the influenza (flu) vaccine    Try to get the vaccine as soon as recommended, usually starting in September or October  ? Get the Tdap vaccine  The Tdap vaccine protects you from tetanus, diphtheria, and pertussis  If possible, get the vaccine during weeks 27 to 36 of your pregnancy  You should get a dose of Tdap with each pregnancy  · Take prenatal vitamins as directed  Your prenatal vitamins should contain folic acid  You need about 600 micrograms (mcg) of folic acid each day during pregnancy  Folic acid helps to form your baby's brain and spinal cord in early pregnancy  · Eat a variety of healthy foods  Healthy foods are important, even if you take a prenatal vitamin  Healthy foods contain nutrients that help keep your immune system strong  Examples of healthy foods include vegetables, fruits, whole-grain breads and cereals, lean meats and poultry, fish, low-fat dairy products, and cooked beans  Do not have raw, undercooked, or unpasteurized food or drinks  Unpasteurized foods are foods that have not gone through the heating process (pasteurization) that destroys bacteria  Your healthcare provider or a dietitian can help you create healthy meal plans  · Talk to your healthcare provider about exercise  Moderate exercise can help keep your immune system strong  Your healthcare provider can help you plan an exercise program that is safe for you during pregnancy  You may need to exercise at home if you cannot exercise outdoors, such as walking in a park  If you want to do pregnancy yoga or other group activities, be safe  Stay at least 6 feet (2 meters) away from others in the class, and the instructor  Wash your hands before you leave the building  Follow the facility's instructions for preventing infections  · Try to lower your stress  You may be feeling more stressed than usual because of the COVID-19 outbreak  You may also feel stress from not being able to share your pregnancy with others   For example, you may not be able to have someone with you during prenatal visits or ultrasounds  Talk to your healthcare providers about ways to manage stress during this time  Pick 1 or 2 times a day to watch the news  Constant news watching about COVID-19 can increase your stress levels  Set a sleep schedule to go to bed and wake up at the same times each day  · Do not smoke cigarettes, drink alcohol, or use drugs  Nicotine and other chemicals in cigarettes and cigars can harm your baby and your health  Alcohol can increase your risk for a miscarriage  Your baby may also be born too small or have other health problems  Certain drugs can be passed to your baby before he or she is born  Some can be passed through breast milk  It is best to quit cigarettes, alcohol, and drugs before you become pregnant and not start again after your baby is born  Ask your healthcare provider for information if you currently use any of these and need help to quit  Protect your  during delivery and while you are in the hospital:  It is not known for sure if an unborn baby can be infected with the virus that causes COVID-19  Some newborns have tested positive for the virus  The newborns may have been infected before, during, or after birth  The greatest risk is for a  to be in close contact with an infected person  Your baby may be tested for the virus soon after being born if you have COVID-23  He or she may be tested again before you leave the hospital  This depends on whether your baby has any signs or symptoms of COVID-19  You will be able to make choices for you and your baby during your hospital stay  Talk to healthcare providers about the following:  · Ask about temporary separation if you have COVID-19  Temporary separation means your  is moved to a different room from you  You will be able to make the decision if you want to do this  Separation will help lower your 's risk for being infected   You will still be able to give your  breast milk  You may need to pump the milk from your breasts  Someone who does not have COVID-19 will then feed the pumped milk to your   You may instead choose to have your baby brought to you when you want to breastfeed  Take precautions to keep your baby safe  Wash your hands and the skin around your nipples before you hold your baby  Wear a face covering while you breastfeed  · Be careful if you have COVID-19 and do not choose temporary separation  Healthcare providers will keep your  at least 6 feet (2 meters) away from you as much as possible  Your  may be placed in an incubator  The incubator will help protect your  from infection  Always wash your hands and put on a face covering when you hold, touch, or have close contact with your   · Ask about visitors  The facility may not be allowing any visitors to newborns during this time  If you are allowed visitors, you may need to limit how many you can have at a time  Do not allow anyone who has known or suspected COVID-19 to visit  Even without signs or symptoms, the person can infect your  or others in the room  All visitors need to wash their hands and put on clean face coverings before entering your room  The face covering needs to stay on during the whole visit  Do not let anyone take the face covering down to make faces at your baby, talk, sneeze, or cough  Do not let anyone kiss you or your baby  Protect your  at home:   · You can choose to continue temporary separation if you have COVID-19  You can do this if an adult who does not have COVID-19 can care for your   Your healthcare provider can give you instructions on how to do this safely at home  Only have close contact with your  when needed  Remember to wash your hands and put on a clean face covering first  You may need to continue pumping your breast milk   A healthy adult can feed the pumped breast milk to your   You may instead choose to have your baby brought to you when you want to breastfeed  Take precautions to keep your baby safe  Wash your hands and the skin around your nipples before you hold your baby  You will also need to wear a face covering while you breastfeed  · Use face coverings safely  Everyone who has COVID-19 needs to wear a clean face covering while being within 6 feet (2 meters) of your   This includes other children in your home who are 2 years or older  Do not put a face covering or plastic face shield on your   Any covering increases your 's risk for sudden infant death syndrome (SIDS)  Do not use coverings on children younger than 2 years or on anyone who has breathing problems or cannot remove it  · Be careful about visitors  Continue precautions you used in the hospital  Do not allow anyone who has known or suspected COVID-19 to come over to see your   Have visitors put on clean face coverings before they enter your home  Have them wash their hands as soon as they come in  The face covering needs to stay on during the whole visit  · Keep all checkup appointments  You may be able to have some appointments by phone or video meeting  Other appointments will need to be in person, such as for vaccines  Vaccines are normally given to babies at certain ages  Until COVID-19 is under control, your 's provider will give you a vaccine schedule  It is important for your  to get all recommended vaccines  What you need to know about breastfeeding:  Breastfeeding for the first 6 months decreases your baby's risk for respiratory (lung) infections, allergies, asthma, and stomach problems  Breast milk also helps your baby develop a strong immune system  Breast milk is considered safe, even if you have COVID-19  Experts currently believe the virus that causes COVID-19 does not spread in breast milk   Do the following to help protect your baby:  · Wash your hands before every breastfeeding or pumping session  Even if you do not have COVID-19, you can transfer the virus from your hands to your baby or the pump  Use soap and water to wash your hands whenever possible  Use hand  that contains alcohol if soap and water are not available  · Clean and sanitize your breast pump after each use  Follow the 's directions for cleaning and sanitizing the pump  It is important not to use it until it is clean and sanitized  · If you have COVID-19:      ? Wear a face covering while you breastfeed or pump  This will help prevent you from passing the virus through droplets when you talk, cough, sneeze, or laugh  The virus can stay on surfaces such as a breast pump for hours to days  ? Have someone who is not infected bottle feed your baby, if possible  Have the person wash his or her hands with soap and water before each feeding  The person can feed your  pumped breast milk or formula  Follow up with your doctor or obstetrician as directed:  Write down your questions so you remember to ask them during your visits  For more information:   · Centers for Disease Control and Prevention  [address] , [address]  Phone: 7- 869 - 056-7129  Web Address: [employer]    © [address] [place] [address]  Information is for End User's use only and may not be sold, redistributed or otherwise used for commercial purposes  All illustrations and images included in CareNotes® are the copyrighted property of A D A M , Inc  or [address] [place] [address]  The above information is an  only  It is not intended as medical advice for individual conditions or treatments  Talk to your doctor, nurse or pharmacist before following any medical regimen to see if it is safe and effective for you  Kick Counts in Pregnancy   AMBULATORY CARE:   Kick counts  measure how much your baby is moving in your womb   A kick from your baby can be felt as a twist, turn, swish, roll, or jab  It is common to feel your baby kicking at 26 to 28 weeks of pregnancy  You may feel your baby kick as early as 20 weeks of pregnancy  You may want to start counting at 28 weeks  Contact your doctor immediately if:   · You feel a change in the number of kicks or movements of your baby  · You feel fewer than 10 kicks within 2 hours  · You have questions or concerns about your baby's movements  Why measure kick counts:  Your baby's movement may provide information about your baby's health  He or she may move less, or not at all, if there are problems  Your baby may move less if he or she is not getting enough oxygen or nutrition from the placenta  Do not smoke while you are pregnant  Smoking decreases the amount of oxygen that gets to your baby  Talk to your healthcare provider if you need help to quit smoking  Tell your healthcare provider as soon as you feel a change in your baby's movements  When to measure kick counts:   · Measure kick counts at the same time every day  · Measure kick counts when your baby is awake and most active  Your baby may be most active in the evening  How to measure kick counts:  Check that your baby is awake before you measure kick counts  You can wake up your baby by lightly pushing on your belly, walking, or drinking something cold  Your healthcare provider may tell you different ways to measure kick counts  You may be told to do the following:  · Use a chart or clock to keep track of the time you start and finish counting  · Sit in a chair or lie on your left side  · Place your hands on the largest part of your belly  · Count until you reach 10 kicks  Write down how much time it takes to count 10 kicks  · It may take 30 minutes to 2 hours to count 10 kicks  It should not take more than 2 hours to count 10 kicks      Follow up with your doctor as directed:  Write down your questions so you remember to ask them during your visits  © Copyright [employer] 2021 Information is for End User's use only and may not be sold, redistributed or otherwise used for commercial purposes  All illustrations and images included in CareNotes® are the copyrighted property of A [employer] M [employer] Inc  or Harry Chiu  The above information is an  only  It is not intended as medical advice for individual conditions or treatments  Talk to your doctor, nurse or pharmacist before following any medical regimen to see if it is safe and effective for you  Pregnancy at [address] to [address]:   What changes are happening with my body? You may continue to have symptoms such as shortness of breath, heartburn, contractions, or swelling of your ankles and feet  You may be gaining about 1 pound a week now  How do I care for myself at this stage of my pregnancy? · Eat a variety of healthy foods  Healthy foods include fruits, vegetables, whole-grain breads, low-fat dairy foods, beans, lean meats, and fish  Drink liquids as directed  Ask how much liquid to drink each day and which liquids are best for you  Limit caffeine to less than 200 milligrams each day  Limit your intake of fish to 2 servings each week  Choose fish low in mercury such as canned light tuna, shrimp, salmon, cod, or tilapia  Do not  eat fish high in mercury such as swordfish, tilefish, citlaly mackerel, and shark  · Manage heartburn  by eating 4 or 5 small meals each day instead of large meals  Avoid spicy food  · Manage swelling  by lying down and putting your feet up  · Take prenatal vitamins as directed  Your need for certain vitamins and minerals, such as folic acid, increases during pregnancy  Prenatal vitamins provide some of the extra vitamins and minerals you need  Prenatal vitamins may also help to decrease the risk of certain birth defects  · Talk to your healthcare provider about exercise    Moderate exercise can help you stay fit  Your healthcare provider will help you plan an exercise program that is safe for you during pregnancy  · Do not smoke  Smoking increases your risk of a miscarriage and other health problems during your pregnancy  Smoking can cause your baby to be born too early or weigh less at birth  Ask your healthcare provider for information if you need help quitting  · Do not drink alcohol  Alcohol passes from your body to your baby through the placenta  It can affect your baby's brain development and cause fetal alcohol syndrome (FAS)  FAS is a group of conditions that causes mental, behavior, and growth problems  · Talk to your healthcare provider before you take any medicines  Many medicines may harm your baby if you take them when you are pregnant  Do not take any medicines, vitamins, herbs, or supplements without first talking to your healthcare provider  Never use illegal or street drugs (such as marijuana or cocaine) while you are pregnant  What are some safety tips during pregnancy? · Avoid hot tubs and saunas  Do not use a hot tub or sauna while you are pregnant, especially during your first trimester  Hot tubs and saunas may raise your baby's temperature and increase the risk of birth defects  · Avoid toxoplasmosis  This is an infection caused by eating raw meat or being around infected cat feces  It can cause birth defects, miscarriages, and other problems  Wash your hands after you touch raw meat  Make sure any meat is well-cooked before you eat it  Avoid raw eggs and unpasteurized milk  Use gloves or ask someone else to clean your cat's litter box while you are pregnant  What changes are happening with my baby? By 34 weeks, your baby may weigh more than 5 pounds  Your baby will be about 12 ½ inches long from the top of the head to the rump (baby's bottom)  Your baby is gaining about ½ pound a week  Your baby's eyes open and close now   Your baby's kicks and movements are more forceful at this time  What do I need to know about prenatal care? Your healthcare provider will check your blood pressure and weight  You may also need the following:  · A urine test  may also be done to check for sugar and protein  These can be signs of gestational diabetes or infection  Protein in your urine may also be a sign of preeclampsia  Preeclampsia is a condition that can develop during week 20 or later of your pregnancy  It causes high blood pressure, and it can cause problems with your kidneys and other organs  · A Tdap vaccine  may be recommended by your healthcare provider  · Fundal height  is a measurement of your uterus to check your baby's growth  This number is usually the same as the number of weeks that you have been pregnant  Your healthcare provider may also check your baby's position  · Your baby's heart rate  will be checked  When should I seek immediate care? · You develop a severe headache that does not go away  · You have new or increased vision changes, such as blurred or spotted vision  · You have new or increased swelling in your face or hands  · You have vaginal spotting or bleeding  · Your water broke or you feel warm water gushing or trickling from your vagina  When should I call my obstetrician? · You have more than 5 contractions in 1 hour  · You notice any changes in your baby's movements  · You have abdominal cramps, pressure, or tightening  · You have a change in vaginal discharge  · You have chills or a fever  · You have vaginal itching, burning, or pain  · You have yellow, green, white, or foul-smelling vaginal discharge  · You have pain or burning when you urinate, less urine than usual, or pink or bloody urine  · You have questions or concerns about your condition or care  CARE AGREEMENT:   You have the right to help plan your care   Learn about your health condition and how it may be treated  Discuss treatment options with your healthcare providers to decide what care you want to receive  You always have the right to refuse treatment  The above information is an  only  It is not intended as medical advice for individual conditions or treatments  Talk to your doctor, nurse or pharmacist before following any medical regimen to see if it is safe and effective for you  © Copyright [employer] 2021 Information is for End User's use only and may not be sold, redistributed or otherwise used for commercial purposes   All illustrations and images included in CareNotes® are the copyrighted property of [employer]  or [address] [place] [address] [employer]

## 2022-02-01 ENCOUNTER — ROUTINE PRENATAL (OUTPATIENT)
Dept: OBGYN CLINIC | Facility: MEDICAL CENTER | Age: 32
End: 2022-02-01

## 2022-02-01 VITALS — BODY MASS INDEX: 29.18 KG/M2 | WEIGHT: 170 LBS | DIASTOLIC BLOOD PRESSURE: 70 MMHG | SYSTOLIC BLOOD PRESSURE: 112 MMHG

## 2022-02-01 DIAGNOSIS — O99.810 ABNORMAL GLUCOSE TOLERANCE TEST (GTT) DURING PREGNANCY, ANTEPARTUM: ICD-10-CM

## 2022-02-01 DIAGNOSIS — O09.813 PREGNANCY RESULTING FROM IN VITRO FERTILIZATION, THIRD TRIMESTER: Primary | ICD-10-CM

## 2022-02-01 DIAGNOSIS — Z3A.33 33 WEEKS GESTATION OF PREGNANCY: ICD-10-CM

## 2022-02-01 DIAGNOSIS — O10.913 MATERNAL CHRONIC HYPERTENSION, THIRD TRIMESTER: ICD-10-CM

## 2022-02-01 PROCEDURE — PNV: Performed by: NURSE PRACTITIONER

## 2022-02-01 NOTE — PROGRESS NOTES
Denies loss of fluid, vaginal bleeding and abdominal pain  Confirms frequent fetal movement  Tolerating prenatal vitamin, Pepcid, low-dose aspirin and iron well  Reviewed with patient to take iron at least 4 hours after taking Pepcid  Gestational diabetes fasting blood sugars  and 2 hour post prandials 105-137  Due to send in log tomorrow  Denies questions or concerns at today's visit     Patient plans include epidural for pain control during labor, circumcision if male infant, breastfeeding and no postpartum contraception   Center ultrasound reviewed -[id number] presentation, normal appearing fetal growth, anterior placenta, no placenta previa, CONG-WNL and EFW 1928g/ 4lb 4oz (56%)  Recommendation for follow-up in 4 weeks for growth scan and  fetal surveillance  BP: 112/70  Weight +38lb  - continue prenatal vitamins and low-dose aspirin daily  -continue fetal kick counts daily  -anemia in pregnancy continue iron every other day  -GDM encouraged to continue close contact with diabetes in pregnancy program   Review goal blood sugars fasting less than 90 and 2 hour postprandial less than 120  - center follow-up 2/15/22  -common discomforts of pregnancy and precautions including  labor reviewed  Signs and symptoms report reviewed    Written information provided about COVID 19  RTO 2 weeks f/u US

## 2022-02-03 ENCOUNTER — TELEMEDICINE (OUTPATIENT)
Dept: PERINATAL CARE | Facility: CLINIC | Age: 32
End: 2022-02-03
Payer: COMMERCIAL

## 2022-02-03 DIAGNOSIS — O26.03 EXCESSIVE WEIGHT GAIN DURING PREGNANCY IN THIRD TRIMESTER: ICD-10-CM

## 2022-02-03 DIAGNOSIS — O24.419 GESTATIONAL DIABETES MELLITUS (GDM) IN THIRD TRIMESTER, GESTATIONAL DIABETES METHOD OF CONTROL UNSPECIFIED: Primary | ICD-10-CM

## 2022-02-03 DIAGNOSIS — O09.813 PREGNANCY RESULTING FROM IN VITRO FERTILIZATION, THIRD TRIMESTER: ICD-10-CM

## 2022-02-03 DIAGNOSIS — O99.810 ABNORMAL GLUCOSE TOLERANCE TEST (GTT) DURING PREGNANCY, ANTEPARTUM: ICD-10-CM

## 2022-02-03 DIAGNOSIS — Z3A.33 33 WEEKS GESTATION OF PREGNANCY: ICD-10-CM

## 2022-02-03 PROCEDURE — G0108 DIAB MANAGE TRN  PER INDIV: HCPCS

## 2022-02-03 NOTE — PROGRESS NOTES
Virtual Regular Visit    Verification of patient location:    Patient is located in the following state in which I hold an active license [place]      Assessment/Plan:    Problem List Items Addressed This Visit        Other    33 weeks gestation of pregnancy    Relevant Orders    Comprehensive metabolic panel    Hemoglobin A1C    Pregnancy resulting from in vitro fertilization, third trimester    Abnormal glucose tolerance test (GTT) during pregnancy, antepartum      Other Visit Diagnoses     Gestational diabetes mellitus (GDM) in third trimester, gestational diabetes method of control unspecified    -  Primary    Relevant Orders    Comprehensive metabolic panel    Hemoglobin A1C    Excessive weight gain during pregnancy in third trimester                   Reason for visit is   Chief Complaint   Patient presents with    Gestational Diabetes    Patient Education    Virtual Regular Visit        Encounter provider Siddiqui Needs    Provider located at [address] [place] [id number] [phone]      Recent Visits  Date Type Provider Dept   01/27/22 [address] [place] [address] Street   Showing recent visits within past 7 days and meeting all other requirements  Today's Visits  Date Type Provider Dept   02/03/22 [address] [place] [address]   Showing today's visits and meeting all other requirements  Future Appointments  No visits were found meeting these conditions  Showing future appointments within next 150 days and meeting all other requirements       The patient was identified by name and date of birth  Eber Sigrid was informed that this is a telemedicine visit and that the visit is being conducted through [address] [place] [address] Now and patient was informed that this is a secure, HIPAA-compliant platform  She agrees to proceed     My office door was closed  No one else was in the room    She acknowledged consent and understanding of privacy and security of the video platform  The patient has agreed to participate and understands they can discontinue the visit at any time  Patient is aware this is a billable service  Subjective  Lise Correia is a 28 y  o pregnant female   HPI     Past Medical History:   Diagnosis Date    Abnormal Pap smear of cervix     Chronic hypertension affecting pregnancy     Female infertility     Gestational diabetes     HPV in female     Kidney stone     Varicella        Past Surgical History:   Procedure Laterality Date    COLPOSCOPY      EXPLORATORY LAPAROTOMY      HYSTEROSCOPY W/ POLYPECTOMY      KIDNEY STONE SURGERY      TONSILLECTOMY      WISDOM TOOTH EXTRACTION         Current Outpatient Medications   Medication Sig Dispense Refill    Blood Glucose Monitoring Suppl (Contour Next EZ) w/Device KIT Dispense 1 kit per insurance formulary  Gestational diabetes  1 kit 0    Contour Next Test test strip Test 4 times per day or as instructed  Gestational diabetes  100 strip 3    famotidine (PEPCID) 10 mg tablet Take 10 mg by mouth 2 (two) times a day as needed for heartburn      ferrous sulfate 325 (65 Fe) mg tablet Take 325 mg by mouth daily with breakfast      Microlet Lancets MISC Use 4 a day or as instructed  Gestational diabetes  100 each 3    Prenatal MV-Min-Fe Fum-FA-DHA (PRENATAL 1 PO) Take 1 tablet by mouth      aspirin 81 mg chewable tablet Chew 2 tablets (162 mg total) daily 180 tablet 1    Doxylamine-Pyridoxine ER (Bonjesta) 20-20 MG TBCR Take 1 tablet by mouth daily (Patient taking differently: Take 1 tablet by mouth as needed  ) 30 tablet 2     No current facility-administered medications for this visit  Allergies   Allergen Reactions    Sulfamethoxazole-Trimethoprim Rash       Review of Systems    Video Exam    There were no vitals filed for this visit      Physical Exam     I spent 60 minutes with patient today in which greater than 50% of the time was spent in counseling/coordination of care regarding gestational diabetes  VIRTUAL VISIT DISCLAIMER      Hugo Marino verbally agrees to participate in [employer]  Pt is aware that [employer] could be limited without vital signs or the ability to perform a full hands-on physical Rigobertoemilia Mortensen understands she or the provider may request at any time to terminate the video visit and request the patient to seek care or treatment in person  Thank you for referring your patient to [facility] Maternal Fetal Medicine Diabetes in Pregnancy Program      Hugo Marino is a  28 y o  female who presents today for Class 2 and Insulin Teaching  Patient is at 33w5d gestation, Estimated Date of Delivery: 3/19/22  Reviewed and updated the following from patients medical record: PMH, Problem List, Allergies, and Current Medications  Visit Diagnosis:  GDM in pregnancy method of control unspecified and Abnormal Glucose Tolerance    Additional Pregnancy Complications:  Excessive weight gain in pregnancy    Labs:    Lab Results   Component Value Date    [id number] 143 (H) 01/04/2022       Lab Results   Component Value Date    GLUF 90 01/13/2022    [id number] 197 (H) 01/13/2022    [id number] 162 (H) 01/13/2022    [id number] 151 (H) 01/13/2022        Provided prescriptions for HGA1c and CMP today  Patient to complete lab work this week at a [address] lab  Medications:  No diabetes related medications   Patient is willing to initiate medications if needed on next report  Patient would like to talk with OB regarding medications      Anthropometrics:  Ht Readings from Last 3 Encounters:   01/20/22 5' 4" (1 626 m)   11/26/21 5' 4" (1 626 m)   11/02/21 5' 4" (1 626 m)     Wt Readings from Last 3 Encounters:   02/01/22 77 1 kg (170 lb)   01/20/22 75 6 kg (166 lb 9 6 oz)   01/18/22 76 2 kg (168 lb)     Pre-gravid weight: 59 9 kg (132 lb)  Pre-gravid BMI: 22 65  Weight Change: 17 2 kg (38 lb)  Weight gain recommendations: BMI (18 5-24 9) 25-35 lbs  Comments: Advised patient to maintain weight for duration of pregnancy  Recent Ultra Sound Results:  Date:22  Fetal Growth:Normal  CONG: Normal  Next US date: 2/15/22    Blood Glucose Monitoring:  Reinforcement of blood glucose goals and reporting guidelines  Glucose Meter: Contour Next EZ  Testing blood sugars: 4 x per day (Fasting, 2 hour after start of each meal)  Method of reporting blood sugars:Glucose Flowsheet    Report blood sugar results weekly via:  Phone: [phone]   OR  My Chart (Message with image attachment, or Glucose Flowsheet)    Goal Blood Sugar Ranges:   Fastin-90 mg/dL  1 hour after the start of each meal: 140 mg/dL or less  2 hours after start of each meal: 120 mg/dL or less      BG Log:  Review of blood glucose log  Meal Plan:  Calories: 2100 calorie    Diet Type: Regular  Additional Nutrition concerns: none    Diet review: Patient is following the meal plan very well  Patient reported biggest struggles with following the diet are her love of desserts  Also, patient is experiencing acid reflux  Sometimes finds it difficult to maintain diet when eating out  Sometimes patient feels full and does not want a bedtime snack  Diet Instruction:  The patient was instructed on the followin  Individualized meal plan  2  Importance of consistent carbohydrate intake via 3 meals and 3 snacks per day  Change bedtime snack to 1 serving CHO (15 gms) and 2-3 ounces of protein  3  Importance of protein as it relates to blood glucose control  4  Encouraged  patient to eat every 2 0-3 5 hours while awake  5  Encouraged patient to go no longer than 8-10 hours fasting overnight until first meal of the day  6  Provided suggested meal/snack options to increase nutrition and maintain consistent meal and snack intakes  7  Discussed appropriate dessert ideas that are appropriate following the GDM diet     8  Suggested using [employer] or MyFitness Pal apps to help guide patient for appropriate food selections when dining out  9  Discussed supplements like CIB light start or sugar free, Glucerna or [employer] high protein shakes  Physical Activity:  Discussed benefits of physical activity to optimize blood glucose control, encouraged activity at patient is physically able  Always consult a physician prior to starting an exercise program  Recommend 20-30 minutes daily  Is patient physically active? encouraged today and patient will start walking 20 minutes daily as approved by OB  Sick day Guidelines:   Patient advised that sickness will raise blood sugar and need to continue medication regimen as directed  If blood sugar is > 160 mg/dL twice in one day call doctor  Instructed on what to do when unable to consume normal meal plan  Hypoglycemia & Treatment Guidelines:  Reviewed what hypoglycemia is, signs and symptoms, and how to treat via the 15:15 rule  Post-Partum Guidelines:  Completion of 75 gm CHO 2 hr gtt at 6 weeks post-partum to check for Type 2 DM diagnosis    Breastfeeding Guidelines:  Continue GDM meal plan plus additional 350-500 calories daily  Stay hydrated by drinking 8-10 (8 oz ) fluids daily  Examples of protein and carbohydrate snacks provided  Dining Out & Travel Guidelines:  Patient advised to be prepared with extra diabetes supplies, medications, and snacks, as well as sticking to the same time schedule and portions eaten at home for meals and snacks  Maternal-Fetal Testing:    Ultrasounds- growth scans every 4 weeks  NST- twice weekly starting at 32nd week GA   CONG- weekly starting at 32 weeks GA  Insulin Education:    No diabetes related medications   Patient is willing to start medication if needed on next report  Patient would like to discuss with OB regarding medication options    Patient is familiar with the insulin pen and reported having instructed her patient's at her office on how to use the insulin pen  Patient is a medical assistant  The patient was instructed on the following:   Insulin administration times, insulin action   Hypoglycemia signs, symptoms and treatment  Advised patient to test blood sugar at 3:00 am for first 3 mornings following insulin start to monitor for hypoglycemia   Increase in maternal-fetal surveillance with insulin initiation   Side effects of hyperglycemia in pregnancy including macrosomia,  hypoglycemia, polyhydramnios, pre-term labor and stillbirth   Continue to monitor blood glucoses via fingerstick fasting (goal 60 mg/dl to 90 mg/dl) and two hours post prandial (goal less than 120 mg/dl)   Non-stress testing two times weekly and CONG testing beginning at 32 weeks gestation   Ultrasounds every 4 weeks at the [address] [place] [address] Maternal Fetal Medicine   HbA1c every 6 to 8 weeks      Patient Stated Goal: "I will eat 3 meals and 3 snacks each day, including protein at each"  Goal Assessment: On track    Diabetes Self Management Support Plan outside of ongoing care: Spouse/Family    Learner/s Present:Learners Present: Patient   Barriers to Learning/Change: No Barriers  Expected Compliance: very good    Date to report blood sugars: Requested patient report blood sugars Monday, 22  Follow up date: no follow up appointment scheduled today    Begin Time: 8:00 am  End Time: 9:00 am    It was a pleasure working with them today  Please feel free to call with any questions or concerns      Angela Abreu, RD,LDN,CDE  Diabetes Educator  [facility]  Diabetes in Pregnancy Program  [address] [place] [address],[address]  [address]

## 2022-02-08 ENCOUNTER — APPOINTMENT (OUTPATIENT)
Dept: LAB | Facility: MEDICAL CENTER | Age: 32
End: 2022-02-08
Payer: COMMERCIAL

## 2022-02-08 DIAGNOSIS — Z3A.33 33 WEEKS GESTATION OF PREGNANCY: ICD-10-CM

## 2022-02-08 DIAGNOSIS — O24.419 GESTATIONAL DIABETES MELLITUS (GDM) IN THIRD TRIMESTER, GESTATIONAL DIABETES METHOD OF CONTROL UNSPECIFIED: ICD-10-CM

## 2022-02-08 LAB
ALBUMIN SERPL BCP-MCNC: 2.7 G/DL (ref 3.5–5)
ALP SERPL-CCNC: 165 U/L (ref 46–116)
ALT SERPL W P-5'-P-CCNC: 13 U/L (ref 12–78)
ANION GAP SERPL CALCULATED.3IONS-SCNC: 7 MMOL/L (ref 4–13)
AST SERPL W P-5'-P-CCNC: 14 U/L (ref 5–45)
BILIRUB SERPL-MCNC: 0.39 MG/DL (ref 0.2–1)
BUN SERPL-MCNC: 12 MG/DL (ref 5–25)
CALCIUM ALBUM COR SERPL-MCNC: 11.1 MG/DL (ref 8.3–10.1)
CALCIUM SERPL-MCNC: 10.1 MG/DL (ref 8.3–10.1)
CHLORIDE SERPL-SCNC: 108 MMOL/L (ref 100–108)
CO2 SERPL-SCNC: 23 MMOL/L (ref 21–32)
CREAT SERPL-MCNC: 0.65 MG/DL (ref 0.6–1.3)
EST. AVERAGE GLUCOSE BLD GHB EST-MCNC: 111 MG/DL
GFR SERPL CREATININE-BSD FRML MDRD: 119 ML/MIN/1.73SQ M
GLUCOSE SERPL-MCNC: 150 MG/DL (ref 65–140)
HBA1C MFR BLD: 5.5 %
POTASSIUM SERPL-SCNC: 4.2 MMOL/L (ref 3.5–5.3)
PROT SERPL-MCNC: 6.8 G/DL (ref 6.4–8.2)
SODIUM SERPL-SCNC: 138 MMOL/L (ref 136–145)

## 2022-02-08 PROCEDURE — 36415 COLL VENOUS BLD VENIPUNCTURE: CPT

## 2022-02-08 PROCEDURE — 83036 HEMOGLOBIN GLYCOSYLATED A1C: CPT

## 2022-02-08 PROCEDURE — 80053 COMPREHEN METABOLIC PANEL: CPT

## 2022-02-13 PROBLEM — O24.410 DIET CONTROLLED GESTATIONAL DIABETES MELLITUS (GDM) IN THIRD TRIMESTER: Status: ACTIVE | Noted: 2022-02-13

## 2022-02-14 ENCOUNTER — DOCUMENTATION (OUTPATIENT)
Dept: PERINATAL CARE | Facility: CLINIC | Age: 32
End: 2022-02-14

## 2022-02-14 DIAGNOSIS — O24.419 GESTATIONAL DIABETES MELLITUS (GDM) IN THIRD TRIMESTER, GESTATIONAL DIABETES METHOD OF CONTROL UNSPECIFIED: ICD-10-CM

## 2022-02-14 DIAGNOSIS — O99.810 HYPERGLYCEMIA IN PREGNANCY: ICD-10-CM

## 2022-02-14 DIAGNOSIS — O99.810 HYPERGLYCEMIA IN PREGNANCY: Primary | ICD-10-CM

## 2022-02-14 RX ORDER — INSULIN GLARGINE 100 [IU]/ML
20 INJECTION, SOLUTION SUBCUTANEOUS
Qty: 15 ML | Refills: 0 | Status: SHIPPED | OUTPATIENT
Start: 2022-02-14 | End: 2022-02-14

## 2022-02-14 RX ORDER — INSULIN GLARGINE 100 [IU]/ML
20 INJECTION, SOLUTION SUBCUTANEOUS
Qty: 15 ML | Refills: 0 | Status: SHIPPED | OUTPATIENT
Start: 2022-02-14 | End: 2022-03-16 | Stop reason: HOSPADM

## 2022-02-14 NOTE — PROGRESS NOTES
Date: 02/14/22  Emmett Valadez  1990  Estimated Date of Delivery: 3/19/22  35w2d  OB/GYN: [facility]   Diagnosis: Diet to Insulin controlled GDM third trimester         Plan:  Start Lantus 20 units at 9 or 10 pm daily  Check 3 am blood sugar for the next 3 mornings  - Discussed with KATIA   Report by Friday 02/18/2022     Diet: 2100 Gestational diabetes meal plan; 3 meals and 3 snacks  02/03/2022: bedtime snack to 1 serving CHO (15 gms) and 2-3 ounces of protein  SMBG: Checks blood sugar 4 times per day; fasting and 2 hour start of each meal    Meter: Contour Next EZ glucose meter  Activity: Walks 30 minutes daily, follow OB recommendations  Support System: Significant Other  Patient Goal: I will eat 3 meals and 3 snacks each day, including protein at each  Education:  Class 1 completed with [place] on 01/27/2022  Class 2 completed with Risa Rodriguez on 02/03/2022  - insulin education provided    -Patient is familiar with the insulin pen and reported having instructed her patient's at her office on how to use the insulin pen  Patient is a medical assistant        Labs  02/08/2022 A1c = 5 5%  02/08/2022 completed normal except glucose     Ultrasounds  01/20/2022 Normal growth and CONG    EFW: 56 % AC: 66 % HC: 37 %  Next US scheduled for 02/15/2022    Further fetal surveillance  NST / CONG weekly - to be twice weekly for insulin start     Aviva Millan RN

## 2022-02-15 ENCOUNTER — ROUTINE PRENATAL (OUTPATIENT)
Dept: OBGYN CLINIC | Facility: MEDICAL CENTER | Age: 32
End: 2022-02-15

## 2022-02-15 ENCOUNTER — ULTRASOUND (OUTPATIENT)
Dept: PERINATAL CARE | Facility: OTHER | Age: 32
End: 2022-02-15
Payer: COMMERCIAL

## 2022-02-15 ENCOUNTER — ROUTINE PRENATAL (OUTPATIENT)
Dept: PERINATAL CARE | Facility: OTHER | Age: 32
End: 2022-02-15
Payer: COMMERCIAL

## 2022-02-15 VITALS — SYSTOLIC BLOOD PRESSURE: 110 MMHG | DIASTOLIC BLOOD PRESSURE: 70 MMHG | WEIGHT: 173 LBS | BODY MASS INDEX: 29.7 KG/M2

## 2022-02-15 VITALS
SYSTOLIC BLOOD PRESSURE: 128 MMHG | HEART RATE: 83 BPM | BODY MASS INDEX: 29.71 KG/M2 | WEIGHT: 174 LBS | HEIGHT: 64 IN | DIASTOLIC BLOOD PRESSURE: 86 MMHG

## 2022-02-15 DIAGNOSIS — O09.813 SUPERVISION OF PREGNANCY RESULTING FROM ASSISTED REPRODUCTIVE TECHNOLOGY IN THIRD TRIMESTER: Primary | ICD-10-CM

## 2022-02-15 DIAGNOSIS — Z3A.35 35 WEEKS GESTATION OF PREGNANCY: ICD-10-CM

## 2022-02-15 DIAGNOSIS — O24.410 DIET CONTROLLED GESTATIONAL DIABETES MELLITUS (GDM) IN THIRD TRIMESTER: ICD-10-CM

## 2022-02-15 DIAGNOSIS — O10.913 MATERNAL CHRONIC HYPERTENSION, THIRD TRIMESTER: ICD-10-CM

## 2022-02-15 DIAGNOSIS — O09.813 PREGNANCY RESULTING FROM IN VITRO FERTILIZATION, THIRD TRIMESTER: Primary | ICD-10-CM

## 2022-02-15 DIAGNOSIS — O99.810 ABNORMAL GLUCOSE TOLERANCE TEST (GTT) DURING PREGNANCY, ANTEPARTUM: ICD-10-CM

## 2022-02-15 PROCEDURE — 59025 FETAL NON-STRESS TEST: CPT | Performed by: OBSTETRICS & GYNECOLOGY

## 2022-02-15 PROCEDURE — 99213 OFFICE O/P EST LOW 20 MIN: CPT | Performed by: OBSTETRICS & GYNECOLOGY

## 2022-02-15 PROCEDURE — NC001 PR NO CHARGE: Performed by: OBSTETRICS & GYNECOLOGY

## 2022-02-15 PROCEDURE — 76816 OB US FOLLOW-UP PER FETUS: CPT | Performed by: OBSTETRICS & GYNECOLOGY

## 2022-02-15 PROCEDURE — PNV: Performed by: OBSTETRICS & GYNECOLOGY

## 2022-02-15 NOTE — PROGRESS NOTES
Non-Stress Testing:    Non-Stress test, equipment, procedure, and expected outcomes reviewed  Reviewed fetal kick counts and when to call OB  Sven Moralez Verified patient understanding of fetal kick counts with teach back method  Patient reports feeling daily fetal movements  Patient has no questions or concerns

## 2022-02-15 NOTE — PROGRESS NOTES
Routine Prenatal Visit  [facility] [address] [place] [address]    Assessment/Plan:  Eliane Hess is a 28y o  year old  at 30w2d who presents for routine prenatal visit  1  Supervision of pregnancy resulting from assisted reproductive technology in third trimester    2  35 weeks gestation of pregnancy    3  Abnormal glucose tolerance test (GTT) during pregnancy, antepartum    4  Diet controlled gestational diabetes mellitus (GDM) in third trimester          Subjective:     CC: Prenatal care    Vickey Bailey is a 28 y o   female who presents for routine prenatal care at 35w3d  Pregnancy ROS: no leakage of fluid, pelvic pain, or vaginal bleeding   good fetal movement  GBS at next visit  Patient has follow-up with  center today to discuss treatment of elevated fasting glucose    The following portions of the patient's history were reviewed and updated as appropriate: allergies, current medications, past family history, past medical history, obstetric history, gynecologic history, past social history, past surgical history and problem list       Objective:  /70   Wt 78 5 kg (173 lb)   BMI 29 70 kg/m²   Pregravid Weight/BMI: 59 9 kg (132 lb) (BMI 22 65)  Current Weight: 78 5 kg (173 lb)   Total Weight Gain: 19 1 kg (42 lb)   Pre-Adonay Vitals      Most Recent Value   Prenatal Assessment    Fetal Heart Rate 140   Movement Present   Prenatal Vitals    Blood Pressure 110/70   Weight - Scale 78 5 kg (173 lb)   Urine Albumin/Glucose    Dilation/Effacement/Station    Vaginal Drainage    Edema    LLE Edema Trace   RLE Edema Trace   Facial Edema None           General: Well appearing, no distress  Respiratory: Unlabored breathing  Cardiovascular: Regular rate  Abdomen: Soft, gravid, nontender  Fundal Height: Appropriate for gestational age  Extremities: Warm and well perfused  Non tender

## 2022-02-15 NOTE — PATIENT INSTRUCTIONS
Fetal Movement   WHAT YOU NEED TO KNOW:   Fetal movements are the kicks, rolls, and hiccups of your unborn baby  You may start to feel these movements when you are 20 weeks pregnant  The movements grow stronger and more frequent as your baby grows  Fetal movements show that your unborn baby is getting the oxygen and nutrients he or she needs before birth  Fewer fetal movements may signal a problem with your baby's health  DISCHARGE INSTRUCTIONS:   Follow up with your doctor or obstetrician as directed:  Write down your questions so you remember to ask them during your visits  Normal fetal movement:  Fetal activity can be described by 4 states, from least to most active  During quiet sleep, your unborn baby may be still for up to 2 hours  During active sleep, he or she kicks, rolls, and moves often  During the quiet awake state, he or she may only move his or her eyes  The active awake state includes strong kicks and rolls  What affects fetal movement:  You may feel your baby move more after you eat, or after you drink caffeine  You may feel your baby move less while you are more active, such as when you exercise  You may also feel fewer movements if you are obese  Certain medicines can change your baby's movements  Tell your healthcare provider about the medicines you are taking  Track fetal movements at home:  Fetal movement is most often felt when you lie quietly on your side  Your healthcare provider may ask you to count movements for 2 hours  He or she may ask you to track how long it takes for your baby to move 10 times  Keep a log of your baby's movements  Contact your doctor or obstetrician if:   · It takes longer than usual to feel 8 of your unborn baby's movements  · You do not feel your unborn baby move at least 10 times in 2 hours  · The skin on your hands, feet, and around your eyes is more swollen than usual     · You have a headache for at least 24 hours      · Tiny red dots appear on your skin     · Your belly is tender when you press on it  · You have questions or concerns about your condition or care  Return to the emergency department if:   · You do not feel your unborn baby move for 12 hours  · You feel cramping or constant pain in your abdomen  · You have heavy bleeding from your vagina  · You have a severe headache and cannot see clearly  · You are having trouble breathing or are vomiting  · You have a seizure  © Copyright [employer] 2021 Information is for End User's use only and may not be sold, redistributed or otherwise used for commercial purposes  All illustrations and images included in CareNotes® are the copyrighted property of A D A M , Inc  or Harry Small [employer]   The above information is an  only  It is not intended as medical advice for individual conditions or treatments  Talk to your doctor, nurse or pharmacist before following any medical regimen to see if it is safe and effective for you  Early Labor Signs   WHAT YOU SHOULD KNOW:   Early labor signs are changes in your body that allow your baby to pass through your birth canal   INSTRUCTIONS:   Signs and symptoms of early labor:   · Lightening  occurs when your baby drops inside your pelvis  You may feel increased pressure in your pelvis  This may happen a few weeks to a few hours before your labor begins  · Contractions  are cramps and tightening that occur in your uterus to help move the baby through your birth canal  Contractions occur regularly and more often each time  Each one lasts about 30 to 70 seconds, and gets stronger and more painful until you deliver your baby  Contractions do not go away with movement  They start in your lower back and move to the front in your abdomen  · Effacement  occurs when your cervix softens and thins, so it can easily open for the baby  Your primary healthcare provider [facility] or obstetrician will examine your cervix for effacement  · Dilation  is widening of your cervix, also for the baby's passage  Your PHP or obstetrician will examine your cervix for dilation  Your cervix will be fully opened and ready for delivery when it is dilated to 10 centimeters  · Increased discharge  from your vagina may occur  It may be pink, clear, or slightly bloody  This discharge may also be called bloody show  Bloody show is a mucus plug that forms and blocks your cervix during pregnancy  · Rupture of membranes  is a sudden release of clear fluid from your vagina  It is also known as when your water breaks  Your PHP or obstetrician may need to break your water if it does not break on its own  False labor: You may have false labor signs, which are also called Xavier Brito contractions  False labor is common and may happen several weeks or days before your actual labor  The contractions are not regular, and do not get closer together  The pain is usually mild, does not worsen, and is felt only in front  Xavier Brito contractions may happen later in the day, and stop after you change position, walk, or rest   Contact your PHP or obstetrician if:   · You have pain in your lower back or abdomen  · You have bloody mucus or show  · You have questions or concerns about your condition or care  Return to the emergency department if:   · You have regular, painful contractions that are less than 5 minutes apart, and last 30 to 70 seconds each  · You have heavy vaginal bleeding  · You have a constant trickle or sudden gush of clear fluid from your vagina  · You notice a sudden decrease in your baby's movement  © 2014 [address] [employer] is for End User's use only and may not be sold, redistributed or otherwise used for commercial purposes  All illustrations and images included in CareNotes® are the copyrighted property of A D A M , Inc  or Bryson Faulkner  The above information is an  only   It is not intended as medical advice for individual conditions or treatments  Talk to your doctor, nurse or pharmacist before following any medical regimen to see if it is safe and effective for you

## 2022-02-15 NOTE — LETTER
February 15, 2022     VIJAYA Dueñas  [address]  [address]    Patient: Wilfredo Vaughan   YOB: 1990   Date of Visit: 2/15/2022       Dear Dr Willy Maxwell: Thank you for referring Wilfredo Vaughan to me for evaluation  Below are my notes for this consultation  If you have questions, please do not hesitate to call me  I look forward to following your patient along with you  Sincerely,        Leeanne Pritchett MD        CC: No Recipients  Leeanne Pritchett MD  2/13/2022  2:22 PM  Sign when Signing Visit  Please refer to the [facility] ultrasound report in Ob Procedures for additional information regarding today's visit

## 2022-02-15 NOTE — PATIENT INSTRUCTIONS
Patient seen, full note dictated.  Acute onset of RLQ abdominal pain this morning.  +N/V, denies F/C  TTP in RLQ, no rebound/guarding  WBC 17.7  CT with mildly dilated appendix with fat stranding    A/Recs:  Acute appendicitis.   -  NPO   -  IVF   -  IV antibiotics   -  OR for laparoscopic appendectomy       Kick Counts in Pregnancy   WHAT YOU NEED TO KNOW:   Kick counts measure how much your baby is moving in your womb  A kick from your baby can be felt as a twist, turn, swish, roll, or jab  It is common to feel your baby kicking at 26 to 28 weeks of pregnancy  You may feel your baby kick as early as 20 weeks of pregnancy  You may want to start counting at 28 weeks  DISCHARGE INSTRUCTIONS:   Contact your doctor immediately if:   · You feel a change in the number of kicks or movements of your baby  · You feel fewer than 10 kicks within 2 hours  · You have questions or concerns about your baby's movements  Why measure kick counts:  Your baby's movement may provide information about your baby's health  He or she may move less, or not at all, if there are problems  Your baby may move less if he or she is not getting enough oxygen or nutrition from the placenta  Do not smoke while you are pregnant  Smoking decreases the amount of oxygen that gets to your baby  Talk to your healthcare provider if you need help to quit smoking  Tell your healthcare provider as soon as you feel a change in your baby's movements  When to measure kick counts:   · Measure kick counts at the same time every day  · Measure kick counts when your baby is awake and most active  Your baby may be most active in the evening  How to measure kick counts:  Check that your baby is awake before you measure kick counts  You can wake up your baby by lightly pushing on your belly, walking, or drinking something cold  Your healthcare provider may tell you different ways to measure kick counts  You may be told to do the following:  · Use a chart or clock to keep track of the time you start and finish counting  · Sit in a chair or lie on your left side  · Place your hands on the largest part of your belly  · Count until you reach 10 kicks  Write down how much time it takes to count 10 kicks       · It may take 30 minutes to 2 hours to count 10 kicks  It should not take more than 2 hours to count 10 kicks  Follow up with your doctor as directed:  Write down your questions so you remember to ask them during your visits  © Copyright [employer] 2021 Information is for End User's use only and may not be sold, redistributed or otherwise used for commercial purposes  All illustrations and images included in CareNotes® are the copyrighted property of A D A M , Inc  or [facility] Staci Cross   The above information is an  only  It is not intended as medical advice for individual conditions or treatments  Talk to your doctor, nurse or pharmacist before following any medical regimen to see if it is safe and effective for you

## 2022-02-17 DIAGNOSIS — O99.810 HYPERGLYCEMIA IN PREGNANCY: Primary | ICD-10-CM

## 2022-02-17 DIAGNOSIS — O24.414 INSULIN CONTROLLED GESTATIONAL DIABETES MELLITUS (GDM) IN THIRD TRIMESTER: ICD-10-CM

## 2022-02-17 RX ORDER — INSULIN GLARGINE-YFGN 100 [IU]/ML
20 INJECTION, SOLUTION SUBCUTANEOUS
Qty: 15 ML | Refills: 0 | Status: SHIPPED | OUTPATIENT
Start: 2022-02-17 | End: 2022-03-16 | Stop reason: HOSPADM

## 2022-02-21 ENCOUNTER — DOCUMENTATION (OUTPATIENT)
Dept: PERINATAL CARE | Facility: CLINIC | Age: 32
End: 2022-02-21

## 2022-02-21 NOTE — PROGRESS NOTES
Date: 02/21/22  Haylie Hood  1990  Estimated Date of Delivery: 3/19/22  36w2d  OB/GYN: [facility]   Diagnosis: Insulin controlled GDM third trimester       Plan:  Clarify if patient ever picked up and started her insulin? Change breakfast to 15 grams (1 CHO) with 3 oz protein   Continue bedtime snack 15 g (1 CHO) and 2/3 PRO    -Was advised on 02/14/2022 to start basal insulin 20 units at 9 or 10 pm daily  Check 3 am blood sugar for the next 3 mornings  Report by Friday 02/18/2022     Diet: 2100 Gestational diabetes meal plan; 3 meals and 3 snacks  02/03/2022: bedtime snack to 1 serving CHO (15 gms) and 2-3 ounces of protein  SMBG: Checks blood sugar 4 times per day; fasting and 2 hour start of each meal    Meter: Contour Next EZ glucose meter  Activity: Walks 30 minutes daily, follow OB recommendations  Support System: Significant Other  Patient Goal: I will eat 3 meals and 3 snacks each day, including protein at each  Education:  Class 1 completed with Elizabeth on 01/27/2022  Class 2 completed with Gloria Hatch on 02/03/2022  - insulin education provided    -Patient is familiar with the insulin pen and reported having instructed her patient's at her office on how to use the insulin pen  Patient is a medical assistant        Labs  02/08/2022 A1c = 5 5%  02/08/2022 completed normal except glucose     Ultrasounds  01/20/2022 Normal growth and CONG    EFW: 56 % AC: 66 % HC: 37 %  02/15/2022 Normal growth and CONG    EFW: 80 % AC: 96 % HC: 39 %    Further fetal surveillance  Twice weekly NST / CONG weekly     Saskia Joya RN

## 2022-02-22 ENCOUNTER — ROUTINE PRENATAL (OUTPATIENT)
Dept: PERINATAL CARE | Facility: OTHER | Age: 32
End: 2022-02-22
Payer: COMMERCIAL

## 2022-02-22 VITALS
WEIGHT: 176.2 LBS | HEIGHT: 64 IN | HEART RATE: 96 BPM | SYSTOLIC BLOOD PRESSURE: 122 MMHG | BODY MASS INDEX: 30.08 KG/M2 | DIASTOLIC BLOOD PRESSURE: 86 MMHG

## 2022-02-22 DIAGNOSIS — O24.414 INSULIN CONTROLLED GESTATIONAL DIABETES MELLITUS (GDM) IN THIRD TRIMESTER: Primary | ICD-10-CM

## 2022-02-22 DIAGNOSIS — O09.813 PREGNANCY RESULTING FROM IN VITRO FERTILIZATION, THIRD TRIMESTER: ICD-10-CM

## 2022-02-22 DIAGNOSIS — Z3A.36 36 WEEKS GESTATION OF PREGNANCY: ICD-10-CM

## 2022-02-22 PROCEDURE — 59025 FETAL NON-STRESS TEST: CPT | Performed by: OBSTETRICS & GYNECOLOGY

## 2022-02-22 NOTE — LETTER
February 22, 2022     Shan Gomez MD  [address]    Patient: Lorri Holter   YOB: 1990   Date of Visit: 2/22/2022       Dear Dr Nic Katz:    Thank you for referring Lorri Holter to me for evaluation  Below are my notes for this consultation  If you have questions, please do not hesitate to call me  I look forward to following your patient along with you  Sincerely,        Shannan Vance MD        CC: No Recipients  Shannan Vance MD  2/22/2022  1:25 PM  Sign when Signing Visit  NST is reactive  Fasting blood sugars  and 2 hour post prandials as high as 143  She is awaiting arrival of her insulin at her pharmacy  Recommend increasing her testing to twice weekly NST and weekly CONG till delivery   Shannan Vance MD

## 2022-02-22 NOTE — PROGRESS NOTES
NST is reactive  Fasting blood sugars  and 2 hour post prandials as high as 143  She is awaiting arrival of her insulin at her pharmacy  Recommend increasing her testing to twice weekly NST and weekly CONG till delivery   Yudi Davalos MD

## 2022-02-23 ENCOUNTER — TELEPHONE (OUTPATIENT)
Dept: PERINATAL CARE | Facility: CLINIC | Age: 32
End: 2022-02-23

## 2022-02-23 PROBLEM — Z3A.36 36 WEEKS GESTATION OF PREGNANCY: Status: ACTIVE | Noted: 2021-09-07

## 2022-02-23 NOTE — TELEPHONE ENCOUNTER
Left voicemail asking patient to call us back or send us a message with an update on her Semglee  We want to know if she was able to  her Semglee and if she started injecting her insulin

## 2022-02-23 NOTE — PATIENT INSTRUCTIONS
Kick Counts in Pregnancy   AMBULATORY CARE:   Kick counts  measure how much your baby is moving in your womb  A kick from your baby can be felt as a twist, turn, swish, roll, or jab  It is common to feel your baby kicking at 26 to 28 weeks of pregnancy  You may feel your baby kick as early as 20 weeks of pregnancy  You may want to start counting at 28 weeks  Contact your doctor immediately if:   · You feel a change in the number of kicks or movements of your baby  · You feel fewer than 10 kicks within 2 hours  · You have questions or concerns about your baby's movements  Why measure kick counts:  Your baby's movement may provide information about your baby's health  He or she may move less, or not at all, if there are problems  Your baby may move less if he or she is not getting enough oxygen or nutrition from the placenta  Do not smoke while you are pregnant  Smoking decreases the amount of oxygen that gets to your baby  Talk to your healthcare provider if you need help to quit smoking  Tell your healthcare provider as soon as you feel a change in your baby's movements  When to measure kick counts:   · Measure kick counts at the same time every day  · Measure kick counts when your baby is awake and most active  Your baby may be most active in the evening  How to measure kick counts:  Check that your baby is awake before you measure kick counts  You can wake up your baby by lightly pushing on your belly, walking, or drinking something cold  Your healthcare provider may tell you different ways to measure kick counts  You may be told to do the following:  · Use a chart or clock to keep track of the time you start and finish counting  · Sit in a chair or lie on your left side  · Place your hands on the largest part of your belly  · Count until you reach 10 kicks  Write down how much time it takes to count 10 kicks  · It may take 30 minutes to 2 hours to count 10 kicks   It should not take more than 2 hours to count 10 kicks  Follow up with your doctor as directed:  Write down your questions so you remember to ask them during your visits  © Copyright [employer] 2021 Information is for End User's use only and may not be sold, redistributed or otherwise used for commercial purposes  All illustrations and images included in CareNotes® are the copyrighted property of A D A M , Inc  or Harry Cross   The above information is an  only  It is not intended as medical advice for individual conditions or treatments  Talk to your doctor, nurse or pharmacist before following any medical regimen to see if it is safe and effective for you  Pregnancy at 28 to 38 Weeks   AMBULATORY CARE:   Changes happening with your body: You are considered full term at the beginning of 37 weeks  Your breathing may be easier if your baby has moved down into a head-down position  You may need to urinate more often because the baby may be pressing on your bladder  You may also feel more discomfort and get tired easily  Seek care immediately if:   · You develop a severe headache that does not go away  · You have new or increased vision changes, such as blurred or spotted vision  · You have new or increased swelling in your face or hands  · You have vaginal spotting or bleeding  · Your water broke or you feel warm water gushing or trickling from your vagina  Call your obstetrician if:   · You have more than 5 contractions in 1 hour  · You notice any changes in your baby's movements  · You have abdominal cramps, pressure, or tightening  · You have a change in vaginal discharge  · You have chills or a fever  · You have vaginal itching, burning, or pain  · You have yellow, green, white, or foul-smelling vaginal discharge  · You have pain or burning when you urinate, less urine than usual, or pink or bloody urine      · You have questions or concerns about your condition or care  How to care for yourself at this stage of your pregnancy:       · Eat a variety of healthy foods  Healthy foods include fruits, vegetables, whole-grain breads, low-fat dairy foods, beans, lean meats, and fish  Drink liquids as directed  Ask how much liquid to drink each day and which liquids are best for you  Limit caffeine to less than 200 milligrams each day  Limit your intake of fish to 2 servings each week  Choose fish low in mercury such as canned light tuna, shrimp, salmon, cod, or tilapia  Do not  eat fish high in mercury such as swordfish, tilefish, citlaly mackerel, and shark  · Take prenatal vitamins as directed  Your need for certain vitamins and minerals, such as folic acid, increases during pregnancy  Prenatal vitamins provide some of the extra vitamins and minerals you need  Prenatal vitamins may also help to decrease the risk of certain birth defects  · Rest as needed  Put your feet up if you have swelling in your ankles and feet  · Talk to your healthcare provider about exercise  Moderate exercise can help you stay fit  Your healthcare provider will help you plan an exercise program that is safe for you during pregnancy  · Do not smoke  Smoking increases your risk of a miscarriage and other health problems during your pregnancy  Smoking can cause your baby to be born early or weigh less at birth  Ask your healthcare provider for information if you need help quitting  · Do not drink alcohol  Alcohol passes from your body to your baby through the placenta  It can affect your baby's brain development and cause fetal alcohol syndrome (FAS)  FAS is a group of conditions that causes mental, behavior, and growth problems  · Talk to your healthcare provider before you take any medicines  Many medicines may harm your baby if you take them when you are pregnant   Do not take any medicines, vitamins, herbs, or supplements without first talking to your healthcare provider  Never use illegal or street drugs (such as marijuana or cocaine) while you are pregnant  Safety tips during pregnancy:   · Avoid hot tubs and saunas  Do not use a hot tub or sauna while you are pregnant, especially during your first trimester  Hot tubs and saunas may raise your baby's temperature and increase the risk of birth defects  · Avoid toxoplasmosis  This is an infection caused by eating raw meat or being around infected cat feces  It can cause birth defects, miscarriages, and other problems  Wash your hands after you touch raw meat  Make sure any meat is well-cooked before you eat it  Avoid raw eggs and unpasteurized milk  Use gloves or ask someone else to clean your cat's litter box while you are pregnant  · Ask your healthcare provider about travel  The most comfortable time to travel is during the second trimester  Ask your provider if you can travel after 36 weeks  You may not be able to travel in an airplane after 36 weeks  He or she may also recommend you avoid long road trips  Changes happening with your baby:  By 38 weeks, your baby may weigh between 6 and 9 pounds  Your baby may be about 14 inches long from the top of the head to the rump (baby's bottom)  Your baby hears well enough to know your voice  As your baby gets larger, you may feel fewer kicks and more stretching and rolling  Your baby may move into a head-down position  Your baby will also rest lower in your abdomen  What you need to know about prenatal care: Your healthcare provider will check your blood pressure and weight  You may also need the following:  · A urine test  may also be done to check for sugar and protein  These can be signs of gestational diabetes or infection  Protein in your urine may also be a sign of preeclampsia  Preeclampsia is a condition that can develop during week 20 or later of your pregnancy   It causes high blood pressure, and it can cause problems with your kidneys and other organs  · A blood test  may be done to check for anemia (low iron level)  · A Tdap vaccine  may be recommended by your healthcare provider  · A group B strep test  is a test that is done to check for group B strep infection  Group B strep is a type of bacteria that may be found in the vagina or rectum  It can be passed to your baby during delivery if you have it  Your healthcare provider will take swab your vagina or rectum and send the sample to the lab for tests  · Fundal height  is a measurement of your uterus to check your baby's growth  This number is usually the same as the number of weeks that you have been pregnant  Your healthcare provider may also check your baby's position  · Your baby's heart rate  will be checked  Follow up with your obstetrician as directed:  Write down your questions so you remember to ask them during your visits  © Copyright [employer] 2021 Information is for End User's use only and may not be sold, redistributed or otherwise used for commercial purposes  All illustrations and images included in CareNotes® are the copyrighted property of A D [employer]  or [facility] Staci Cross   The above information is an  only  It is not intended as medical advice for individual conditions or treatments  Talk to your doctor, nurse or pharmacist before following any medical regimen to see if it is safe and effective for you

## 2022-02-23 NOTE — ASSESSMENT & PLAN NOTE
- Reviewed s/s labor, FKC  - GBS today  - twice per week NST's and weekly CONG's - scheduled at [facility]  - Reports sugars to Diabetes and Pregnancy program in the [address] [place] [address] at least once a week for review of her blood glucose values    -Delivery is recommended at 39 0/7 weeks to 39 6/7- will schedule at next visit  - next visit 1 week

## 2022-02-24 ENCOUNTER — ULTRASOUND (OUTPATIENT)
Dept: PERINATAL CARE | Facility: CLINIC | Age: 32
End: 2022-02-24
Payer: COMMERCIAL

## 2022-02-24 ENCOUNTER — ROUTINE PRENATAL (OUTPATIENT)
Dept: OBGYN CLINIC | Facility: MEDICAL CENTER | Age: 32
End: 2022-02-24

## 2022-02-24 VITALS — SYSTOLIC BLOOD PRESSURE: 122 MMHG | WEIGHT: 175.5 LBS | BODY MASS INDEX: 30.12 KG/M2 | DIASTOLIC BLOOD PRESSURE: 80 MMHG

## 2022-02-24 VITALS
WEIGHT: 175.6 LBS | BODY MASS INDEX: 29.98 KG/M2 | HEART RATE: 84 BPM | SYSTOLIC BLOOD PRESSURE: 138 MMHG | HEIGHT: 64 IN | DIASTOLIC BLOOD PRESSURE: 73 MMHG

## 2022-02-24 DIAGNOSIS — O24.414 INSULIN CONTROLLED GESTATIONAL DIABETES MELLITUS (GDM) IN THIRD TRIMESTER: Primary | ICD-10-CM

## 2022-02-24 DIAGNOSIS — O10.913 MATERNAL CHRONIC HYPERTENSION, THIRD TRIMESTER: ICD-10-CM

## 2022-02-24 DIAGNOSIS — O09.813 SUPERVISION OF PREGNANCY RESULTING FROM ASSISTED REPRODUCTIVE TECHNOLOGY IN THIRD TRIMESTER: ICD-10-CM

## 2022-02-24 DIAGNOSIS — O24.414 INSULIN CONTROLLED GESTATIONAL DIABETES MELLITUS (GDM) IN THIRD TRIMESTER: ICD-10-CM

## 2022-02-24 DIAGNOSIS — Z3A.36 36 WEEKS GESTATION OF PREGNANCY: ICD-10-CM

## 2022-02-24 DIAGNOSIS — Z34.93 THIRD TRIMESTER PREGNANCY: Primary | ICD-10-CM

## 2022-02-24 PROCEDURE — 76815 OB US LIMITED FETUS(S): CPT | Performed by: OBSTETRICS & GYNECOLOGY

## 2022-02-24 PROCEDURE — PNV: Performed by: ADVANCED PRACTICE MIDWIFE

## 2022-02-24 PROCEDURE — 87150 DNA/RNA AMPLIFIED PROBE: CPT | Performed by: ADVANCED PRACTICE MIDWIFE

## 2022-02-24 PROCEDURE — 59025 FETAL NON-STRESS TEST: CPT | Performed by: OBSTETRICS & GYNECOLOGY

## 2022-02-24 NOTE — PROGRESS NOTES
Repeat Non-Stress Testing:    Pt verbalizes +FM  Pt denies ALL:               Leaking of fluid   Contractions   Vaginal bleeding   Decreased fetal movement    Patient has no questions or concerns  Dr Bindu Neal viewed NST strip prior to completion of visit

## 2022-02-24 NOTE — PROGRESS NOTES
Routine Prenatal Visit  [facility] [address] [place] [address]    Assessment/Plan:  Dalton Estrella is a 28y o  year old  at 44w9d who presents for routine prenatal visit  1  Third trimester pregnancy  -     Strep B DNA probe, amplification    2  36 weeks gestation of pregnancy  Assessment & Plan:  - Reviewed s/s labor, FKC  - GBS today  - twice per week NST's and weekly CONG's - scheduled at [facility]  - Reports sugars to Diabetes and Pregnancy program in the [address] [place] [address] at least once a week for review of her blood glucose values  -Delivery is recommended at 39 0/7 weeks to 39 6/7- will schedule at next visit  - next visit 1 week    Orders:  -     Strep B DNA probe, amplification    3  Insulin controlled gestational diabetes mellitus (GDM) in third trimester  Assessment & Plan:  Fasting sugars have been running high-  Just got insulin last night taking 20 units Semglee  2 hr pp- higher at breakfast and diet adjusted  - reports to Diabetes in pregnancy weekly  Lab Results   Component Value Date    HGBA1C 5 5 2022         4  Maternal chronic hypertension, third trimester    5  Supervision of pregnancy resulting from assisted reproductive technology in third trimester        Subjective:     CC: Prenatal care    Douglas Gaitan is a 28 y o   female who presents for routine prenatal care at 44w9d  Pregnancy ROS: No leakage of fluid, pelvic pain, or vaginal bleeding  Good fetal movement  Reviewed cervical ripening and induction of labor process       The following portions of the patient's history were reviewed and updated as appropriate: allergies, current medications, past family history, past medical history, obstetric history, gynecologic history, past social history, past surgical history and problem list       Objective:  /80   Wt 79 6 kg (175 lb 8 oz)   BMI 30 12 kg/m²   Pregravid Weight/BMI: 59 9 kg (132 lb) (BMI 22 65)  Current Weight: 79 6 kg (175 lb 8 oz) Total Weight Gain: 19 7 kg (43 lb 8 oz)   Pre-Adonay Vitals      Most Recent Value   Prenatal Assessment    Fetal Heart Rate 158   Fundal Height (cm) 38 cm   Movement Present   Prenatal Vitals    Blood Pressure 122/80   Weight - Scale 79 6 kg (175 lb 8 oz)   Urine Albumin/Glucose    Dilation/Effacement/Station    Vaginal Drainage    Edema    LLE Edema Trace   RLE Edema Trace           General: Well appearing, no distress  Respiratory: Unlabored breathing  Cardiovascular: Regular rate  Abdomen: Soft, gravid, nontender  Fundal Height: Appropriate for gestational age  Extremities: Warm and well perfused  Non tender

## 2022-02-24 NOTE — PATIENT INSTRUCTIONS
Kick Counts in Pregnancy   AMBULATORY CARE:   Kick counts  measure how much your baby is moving in your womb  A kick from your baby can be felt as a twist, turn, swish, roll, or jab  It is common to feel your baby kicking at 26 to 28 weeks of pregnancy  You may feel your baby kick as early as 20 weeks of pregnancy  You may want to start counting at 28 weeks  Contact your doctor immediately if:   · You feel a change in the number of kicks or movements of your baby  · You feel fewer than 10 kicks within 2 hours  · You have questions or concerns about your baby's movements  Why measure kick counts:  Your baby's movement may provide information about your baby's health  He or she may move less, or not at all, if there are problems  Your baby may move less if he or she is not getting enough oxygen or nutrition from the placenta  Do not smoke while you are pregnant  Smoking decreases the amount of oxygen that gets to your baby  Talk to your healthcare provider if you need help to quit smoking  Tell your healthcare provider as soon as you feel a change in your baby's movements  When to measure kick counts:   · Measure kick counts at the same time every day  · Measure kick counts when your baby is awake and most active  Your baby may be most active in the evening  How to measure kick counts:  Check that your baby is awake before you measure kick counts  You can wake up your baby by lightly pushing on your belly, walking, or drinking something cold  Your healthcare provider may tell you different ways to measure kick counts  You may be told to do the following:  · Use a chart or clock to keep track of the time you start and finish counting  · Sit in a chair or lie on your left side  · Place your hands on the largest part of your belly  · Count until you reach 10 kicks  Write down how much time it takes to count 10 kicks  · It may take 30 minutes to 2 hours to count 10 kicks   It should not take more than 2 hours to count 10 kicks  Follow up with your doctor as directed:  Write down your questions so you remember to ask them during your visits  © Copyright [employer] 2021 Information is for End User's use only and may not be sold, redistributed or otherwise used for commercial purposes  All illustrations and images included in CareNotes® are the copyrighted property of A D A M , Inc  or [facility] Staci Cross   The above information is an  only  It is not intended as medical advice for individual conditions or treatments  Talk to your doctor, nurse or pharmacist before following any medical regimen to see if it is safe and effective for you

## 2022-02-24 NOTE — LETTER
NST sleeve cover sheet    Patient name: Misti Warren  : 1990  MRN: [id number]    GUILLE: Estimated Date of Delivery: 3/19/22    Obstetrician: [facility]      Reason(s) for testing: GEST DM   __________________________________________      Testing frequency:    __X_ 2x/wk  ___ 1x/wk  ___ Dopplers  ___ BPP?       Last growth scan: __________________________________________

## 2022-02-24 NOTE — ASSESSMENT & PLAN NOTE
Fasting sugars have been running high-  Just got insulin last night taking 20 units Semglee  2 hr pp- higher at breakfast and diet adjusted  - reports to Diabetes in pregnancy weekly     Lab Results   Component Value Date    HGBA1C 5 5 02/08/2022

## 2022-02-25 LAB — GP B STREP DNA SPEC QL NAA+PROBE: NEGATIVE

## 2022-02-28 ENCOUNTER — DOCUMENTATION (OUTPATIENT)
Dept: PERINATAL CARE | Facility: CLINIC | Age: 32
End: 2022-02-28

## 2022-03-01 ENCOUNTER — ROUTINE PRENATAL (OUTPATIENT)
Dept: OBGYN CLINIC | Facility: MEDICAL CENTER | Age: 32
End: 2022-03-01

## 2022-03-01 ENCOUNTER — ROUTINE PRENATAL (OUTPATIENT)
Dept: PERINATAL CARE | Facility: OTHER | Age: 32
End: 2022-03-01
Payer: COMMERCIAL

## 2022-03-01 VITALS
WEIGHT: 176.8 LBS | HEIGHT: 64 IN | BODY MASS INDEX: 30.19 KG/M2 | SYSTOLIC BLOOD PRESSURE: 127 MMHG | HEART RATE: 81 BPM | DIASTOLIC BLOOD PRESSURE: 85 MMHG

## 2022-03-01 VITALS — WEIGHT: 176.7 LBS | DIASTOLIC BLOOD PRESSURE: 78 MMHG | SYSTOLIC BLOOD PRESSURE: 122 MMHG | BODY MASS INDEX: 30.33 KG/M2

## 2022-03-01 DIAGNOSIS — O09.813 SUPERVISION OF PREGNANCY RESULTING FROM ASSISTED REPRODUCTIVE TECHNOLOGY IN THIRD TRIMESTER: Primary | ICD-10-CM

## 2022-03-01 DIAGNOSIS — Z3A.37 37 WEEKS GESTATION OF PREGNANCY: ICD-10-CM

## 2022-03-01 DIAGNOSIS — O10.913 MATERNAL CHRONIC HYPERTENSION, THIRD TRIMESTER: ICD-10-CM

## 2022-03-01 DIAGNOSIS — O24.414 INSULIN CONTROLLED GESTATIONAL DIABETES MELLITUS (GDM) IN THIRD TRIMESTER: Primary | ICD-10-CM

## 2022-03-01 DIAGNOSIS — O24.414 INSULIN CONTROLLED GESTATIONAL DIABETES MELLITUS (GDM) IN THIRD TRIMESTER: ICD-10-CM

## 2022-03-01 PROCEDURE — PNV: Performed by: STUDENT IN AN ORGANIZED HEALTH CARE EDUCATION/TRAINING PROGRAM

## 2022-03-01 PROCEDURE — 59025 FETAL NON-STRESS TEST: CPT | Performed by: OBSTETRICS & GYNECOLOGY

## 2022-03-01 NOTE — ASSESSMENT & PLAN NOTE
- GBS negative  - Delivery Plan: IOL Scheduled for 39w1d for A2GDM on insulin and CHTN   03/13/2022 8PM cervical ripening  EFW 80th%ile with AC 96th%ile; discussed risk factors and management of shoulder dystocia  - Feeding: Breastfeeding, has pump  - Pediatrician: [facility]  - Immunizations: s/p influenza, Tdap, and COVID-19 vaccines  - Perineal massage education reviewed  - Fetal kick counts and labor precautions reviewed

## 2022-03-01 NOTE — ASSESSMENT & PLAN NOTE
- Current Regimen: Glargine 20 units qhs  - Glucose Log: Demonstrates euglycemia on current regimen  - Continue to maintain contact with the  Diabetes program at least once a week for review of her blood glucose values and adjustment of insulin doses  - Antepartum Fetal Surveillance: Twice weekly NST starting at 32 weeks, growth ultrasound q 4 weeks  - Delivery Timing: Delivery is recommended at 39 0/7 weeks to 39 6/7 weeks gestation, sooner if otherwise clinically indicated

## 2022-03-01 NOTE — PATIENT INSTRUCTIONS
Thank you for choosing us for your  care today  If you have any questions about your ultrasound or care, please do not hesitate to contact us or your primary obstetrician  Some general instructions for your pregnancy are:     Protect against coronavirus: get vaccinated - pregnant women are increased risk of severe COVID  Notify your primary care doctor if you have any symptoms   Exercise: Aim for 22 minutes per day (150 minutes per week) of regular exercise  Walking is great!  Nutrition: aim for calcium-rich and iron-rich foods as well as healthy sources of protein   Learn about Preeclampsia: preeclampsia is a common, serious high blood pressure complication in pregnancy  A blood pressure of [id number] (systolic or top number) or 69JMSN (diastolic or bottom number) is not normal and needs evaluation by your doctor  Aspirin is sometimes prescribed in early pregnancy to prevent preeclampsia in women with risk factors - ask your obstetrician if you should be on this medication   If you smoke, try to reduce how many cigarettes you smoke or try to quit completely  Do not vape   Other warning signs to watch out for in pregnancy or postpartum: chest pain, obstructed breathing or shortness of breath, seizures, thoughts of hurting yourself or your baby, bleeding, a painful or swollen leg, fever, or headache (see [employer]WHON[facility] POST-BIRTH Warning Signs campaign)  If these happen call 911  Itching is also not normal in pregnancy and if you experience this, especially over your hands and feet, potentially worse at night, notify your doctors

## 2022-03-01 NOTE — PROGRESS NOTES
[address]: Ms Jose Guadalupe Grimes was seen today for NST (found under the pregnancy episode) which I reviewed the RN assessment and agree  Please don't hesitate to contact our office with any concerns or questions    Kedar Oconnor MD

## 2022-03-01 NOTE — PROGRESS NOTES
Routine Prenatal Visit  [facility] [address] [place] [address]    Assessment/Plan:  Burgess Dale is a 28y o  year old  at 44w3d who presents for routine prenatal visit  1  Supervision of pregnancy resulting from assisted reproductive technology in third trimester    2  37 weeks gestation of pregnancy  Assessment & Plan:  - GBS negative  - Delivery Plan: IOL Scheduled for 39w1d for A2GDM on insulin and CHTN   2022 8PM cervical ripening  EFW 80th%ile with AC 96th%ile; discussed risk factors and management of shoulder dystocia  - Feeding: Breastfeeding, has pump  - Pediatrician: [facility]  - Immunizations: s/p influenza, Tdap, and COVID-19 vaccines  - Perineal massage education reviewed  - Fetal kick counts and labor precautions reviewed  3  Insulin controlled gestational diabetes mellitus (GDM) in third trimester  Assessment & Plan:  - Current Regimen: Glargine 20 units qhs  - Glucose Log: Demonstrates euglycemia on current regimen  - Continue to maintain contact with the  Diabetes program at least once a week for review of her blood glucose values and adjustment of insulin doses  - Antepartum Fetal Surveillance: Twice weekly NST starting at 32 weeks, growth ultrasound q 4 weeks  - Delivery Timing: Delivery is recommended at 39 0/7 weeks to 39 6/7 weeks gestation, sooner if otherwise clinically indicated  4  Maternal chronic hypertension, third trimester  Assessment & Plan:  - Per MFM, delivery is recommended between [id number] due to no elevations in blood pressure since 20 weeks gestation          Subjective:     CC: Prenatal care    Melita Jeffery is a 28 y o   female who presents for routine prenatal care at 37w3d  Pregnancy ROS: Denies leakage of fluid, pelvic pain, or vaginal bleeding  Reports normal fetal movement      She denies headache, visual disturbances, dyspnea, chest pain, epigastric pain, right upper quadrant pain, or generalized edema       The following portions of the patient's history were reviewed and updated as appropriate: allergies, current medications, past family history, past medical history, obstetric history, gynecologic history, past social history, past surgical history and problem list       Objective:  /78   Wt 80 2 kg (176 lb 11 2 oz)   BMI 30 33 kg/m²   Pregravid Weight/BMI: 59 9 kg (132 lb) (BMI 22 65)  Current Weight: 80 2 kg (176 lb 11 2 oz)   Total Weight Gain: 20 3 kg (44 lb 11 2 oz)   Pre- Vitals      Most Recent Value   Prenatal Assessment    Fetal Heart Rate 154   Movement Present   Prenatal Vitals    Blood Pressure 122/78   Weight - Scale 80 2 kg (176 lb 11 2 oz)   Urine Albumin/Glucose    Dilation/Effacement/Station    Vaginal Drainage    Edema    LLE Edema None   RLE Edema None           General: Well appearing, no distress  Respiratory: Unlabored breathing  Cardiovascular: Regular rate  Abdomen: Soft, gravid, nontender  Fundal Height: Appropriate for gestational age  Extremities: Warm and well perfused  Non tender      Nicole Magana MD  [address] [place] [address]  3/1/2022 8:45 AM

## 2022-03-02 NOTE — PROGRESS NOTES
Please refer to the [facility] ultrasound report in Ob Procedures for additional information regarding today's visit

## 2022-03-03 ENCOUNTER — ULTRASOUND (OUTPATIENT)
Dept: PERINATAL CARE | Facility: OTHER | Age: 32
End: 2022-03-03
Payer: COMMERCIAL

## 2022-03-03 VITALS
BODY MASS INDEX: 30.93 KG/M2 | WEIGHT: 181.2 LBS | SYSTOLIC BLOOD PRESSURE: 126 MMHG | HEIGHT: 64 IN | DIASTOLIC BLOOD PRESSURE: 80 MMHG | HEART RATE: 80 BPM

## 2022-03-03 DIAGNOSIS — O09.813 PREGNANCY RESULTING FROM IN VITRO FERTILIZATION, THIRD TRIMESTER: ICD-10-CM

## 2022-03-03 DIAGNOSIS — Z3A.37 37 WEEKS GESTATION OF PREGNANCY: Primary | ICD-10-CM

## 2022-03-03 DIAGNOSIS — O24.414 INSULIN CONTROLLED GESTATIONAL DIABETES MELLITUS (GDM) IN THIRD TRIMESTER: ICD-10-CM

## 2022-03-03 PROCEDURE — 76815 OB US LIMITED FETUS(S): CPT | Performed by: OBSTETRICS & GYNECOLOGY

## 2022-03-03 PROCEDURE — 59025 FETAL NON-STRESS TEST: CPT | Performed by: OBSTETRICS & GYNECOLOGY

## 2022-03-03 NOTE — LETTER
March 3, 2022     [address] [place]  [address]  [address]    Patient: Jaylene Epps   YOB: 1990   Date of Visit: 3/3/2022       Dear Dr Paola Conner: Thank you for referring Jaylene Epps to me for evaluation  Below are my notes for this consultation  If you have questions, please do not hesitate to call me  I look forward to following your patient along with you  Sincerely,        Navid Ryan MD        CC: No Recipients  Navid Ryan MD  3/2/2022  4:09 PM  Sign when Signing Visit  Please refer to the [facility] ultrasound report in Ob Procedures for additional information regarding today's visit

## 2022-03-08 ENCOUNTER — ROUTINE PRENATAL (OUTPATIENT)
Dept: PERINATAL CARE | Facility: OTHER | Age: 32
End: 2022-03-08
Payer: COMMERCIAL

## 2022-03-08 ENCOUNTER — ROUTINE PRENATAL (OUTPATIENT)
Dept: OBGYN CLINIC | Facility: MEDICAL CENTER | Age: 32
End: 2022-03-08

## 2022-03-08 VITALS — DIASTOLIC BLOOD PRESSURE: 70 MMHG | BODY MASS INDEX: 31.93 KG/M2 | WEIGHT: 186 LBS | SYSTOLIC BLOOD PRESSURE: 110 MMHG

## 2022-03-08 VITALS
BODY MASS INDEX: 31.72 KG/M2 | HEIGHT: 64 IN | DIASTOLIC BLOOD PRESSURE: 82 MMHG | SYSTOLIC BLOOD PRESSURE: 136 MMHG | WEIGHT: 185.8 LBS | HEART RATE: 86 BPM

## 2022-03-08 DIAGNOSIS — O09.813 SUPERVISION OF PREGNANCY RESULTING FROM ASSISTED REPRODUCTIVE TECHNOLOGY IN THIRD TRIMESTER: Primary | ICD-10-CM

## 2022-03-08 DIAGNOSIS — O10.919 CHRONIC HYPERTENSION AFFECTING PREGNANCY: ICD-10-CM

## 2022-03-08 DIAGNOSIS — O24.414 INSULIN CONTROLLED GESTATIONAL DIABETES MELLITUS (GDM) IN THIRD TRIMESTER: ICD-10-CM

## 2022-03-08 DIAGNOSIS — Z3A.38 38 WEEKS GESTATION OF PREGNANCY: ICD-10-CM

## 2022-03-08 DIAGNOSIS — Z3A.38 38 WEEKS GESTATION OF PREGNANCY: Primary | ICD-10-CM

## 2022-03-08 PROBLEM — O99.810 ABNORMAL GLUCOSE TOLERANCE TEST (GTT) DURING PREGNANCY, ANTEPARTUM: Status: RESOLVED | Noted: 2022-01-28 | Resolved: 2022-03-08

## 2022-03-08 PROCEDURE — 59025 FETAL NON-STRESS TEST: CPT | Performed by: OBSTETRICS & GYNECOLOGY

## 2022-03-08 PROCEDURE — PNV: Performed by: STUDENT IN AN ORGANIZED HEALTH CARE EDUCATION/TRAINING PROGRAM

## 2022-03-08 NOTE — ASSESSMENT & PLAN NOTE
- GBS negative  - Delivery Plan: IOL Scheduled for 39w1d for A2GDM on insulin and CHTN   03/13/2022 8PM cervical ripening  EFW 80th%ile with AC 96th%ile;   discussed risk factors and management of shoulder dystocia   - SVE 0 5/20/-3 posterior soft - Discussed cervical ripening with velásquez balloon  - Feeding: Breastfeeding, has pump  - Pediatrician: [facility]  - Immunizations: s/p influenza, Tdap, and COVID-19 vaccines  - Perineal massage education reviewed  - Fetal kick counts and labor precautions reviewed

## 2022-03-08 NOTE — PROGRESS NOTES
Routine Prenatal Visit  [facility] [address] [place] [address]    Assessment/Plan:  Kaci Cole is a 28y o  year old  at 36w4d who presents for routine prenatal visit  1  Supervision of pregnancy resulting from assisted reproductive technology in third trimester    2  38 weeks gestation of pregnancy  Assessment & Plan:  - GBS negative  - Delivery Plan: IOL Scheduled for 39w1d for A2GDM on insulin and CHTN   2022 8PM cervical ripening  EFW 80th%ile with AC 96th%ile;   discussed risk factors and management of shoulder dystocia   - SVE 0 -3 posterior soft - Discussed cervical ripening with velásquez balloon  - Feeding: Breastfeeding, has pump  - Pediatrician: [facility]  - Immunizations: s/p influenza, Tdap, and COVID-19 vaccines  - Perineal massage education reviewed  - Fetal kick counts and labor precautions reviewed  3  Insulin controlled gestational diabetes mellitus (GDM) in third trimester  Assessment & Plan:  - Current Regimen: Glargine 20 units qhs  - Glucose Log: Demonstrates euglycemia on current regimen  - Continue to maintain contact with the  Diabetes program at least once a week for review of her blood glucose values and adjustment of insulin doses  - Antepartum Fetal Surveillance: Twice weekly NST starting at 32 weeks, growth ultrasound q 4 weeks  - Delivery Timing: Delivery is recommended at 39 0/7 weeks to 39 6/7 weeks gestation, sooner if otherwise clinically indicated  4  Chronic hypertension affecting pregnancy        Subjective:     CC: Prenatal care    Acosta Mary is a 28 y o   female who presents for routine prenatal care at 38w3d  Pregnancy ROS: Denies leakage of fluid, pelvic pain, or vaginal bleeding  Reports normal fetal movement      The following portions of the patient's history were reviewed and updated as appropriate: allergies, current medications, past family history, past medical history, obstetric history, gynecologic history, past social history, past surgical history and problem list       Objective:  /70   Wt 84 4 kg (186 lb)   BMI 31 93 kg/m²   Pregravid Weight/BMI: 59 9 kg (132 lb) (BMI 22 65)  Current Weight: 84 4 kg (186 lb)   Total Weight Gain: 24 5 kg (54 lb)   Pre- Vitals      Most Recent Value   Prenatal Assessment    Fetal Heart Rate 143   Fundal Height (cm) 40 cm   Movement Present   Prenatal Vitals    Blood Pressure 110/70   Weight - Scale 84 4 kg (186 lb)   Urine Albumin/Glucose    Dilation/Effacement/Station    Cervical Dilation   5   Cervical Effacement 20   Fetal Station -3   Vaginal Drainage    Edema    LLE Edema Trace   RLE Edema Trace           General: Well appearing, no distress  Respiratory: Unlabored breathing  Cardiovascular: Regular rate  Abdomen: Soft, gravid, nontender  Fundal Height: Appropriate for gestational age  Extremities: Warm and well perfused  Non tender      Virgilio Medel MD  [address] [place] [address]  3/8/2022 8:47 AM

## 2022-03-09 ENCOUNTER — DOCUMENTATION (OUTPATIENT)
Dept: PERINATAL CARE | Facility: CLINIC | Age: 32
End: 2022-03-09

## 2022-03-09 NOTE — PROGRESS NOTES
Date: 03/09/22  Lamar Logan  1990  Estimated Date of Delivery: 3/19/22  38w4d  OB/GYN: [facility]   Diagnosis: Insulin controlled GDM third trimester         Plan: IOL scheduled for 03/13/2022 at 8 pm  Semglee 20 units at 9 or 10 pm daily  Continue Breakfast 15 grams (1 CHO) with 3 oz protein   Lunch - decrease to 45 (3 CHO) and 3/4 oz protein  Continue bedtime snack 15 g (1 CHO) and 2/3 PRO     Diet: 2100 Gestational diabetes meal plan; 3 meals and 3 snacks  02/03/2022: bedtime snack to 1 serving CHO (15 gms) and 2-3 ounces of protein  SMBG: Checks blood sugar 4 times per day; fasting and 2 hour start of each meal    Meter: Contour Next EZ glucose meter  Activity: Walks 30 minutes daily, follow OB recommendations  Support System: Significant Other  Patient Goal: I will eat 3 meals and 3 snacks each day, including protein at each  Education:  Class 1 completed with Elizabeth on 01/27/2022  Class 2 completed with Davey Pacheco on 02/03/2022  - insulin education provided    -Patient is familiar with the insulin pen and reported having instructed her patient's at her office on how to use the insulin pen  Patient is a medical assistant        Labs  02/08/2022 A1c = 5 5%  02/08/2022 completed normal except glucose     Ultrasounds  01/20/2022 Normal growth and CONG    EFW: 56 % AC: 66 % HC: 37 %  02/15/2022 Normal growth and CONG    EFW: 80 % AC: 96 % HC: 39 %  Next US scheduled for 03/10/2022    Further fetal surveillance  Twice weekly NST / CONG weekly     David Maldonado RN

## 2022-03-10 ENCOUNTER — ULTRASOUND (OUTPATIENT)
Dept: PERINATAL CARE | Facility: OTHER | Age: 32
End: 2022-03-10
Payer: COMMERCIAL

## 2022-03-10 ENCOUNTER — ROUTINE PRENATAL (OUTPATIENT)
Dept: PERINATAL CARE | Facility: OTHER | Age: 32
End: 2022-03-10
Payer: COMMERCIAL

## 2022-03-10 VITALS
DIASTOLIC BLOOD PRESSURE: 84 MMHG | WEIGHT: 185.2 LBS | BODY MASS INDEX: 31.62 KG/M2 | HEART RATE: 90 BPM | HEIGHT: 64 IN | SYSTOLIC BLOOD PRESSURE: 131 MMHG

## 2022-03-10 DIAGNOSIS — Z3A.38 38 WEEKS GESTATION OF PREGNANCY: ICD-10-CM

## 2022-03-10 DIAGNOSIS — O24.414 INSULIN CONTROLLED GESTATIONAL DIABETES MELLITUS (GDM) IN THIRD TRIMESTER: ICD-10-CM

## 2022-03-10 DIAGNOSIS — O10.913 MATERNAL CHRONIC HYPERTENSION, THIRD TRIMESTER: ICD-10-CM

## 2022-03-10 DIAGNOSIS — O36.63X0 LARGE FOR GESTATIONAL AGE FETUS AFFECTING MOTHER, ANTEPARTUM, THIRD TRIMESTER, SINGLE GESTATION: ICD-10-CM

## 2022-03-10 DIAGNOSIS — O24.414 INSULIN CONTROLLED GESTATIONAL DIABETES MELLITUS (GDM) IN THIRD TRIMESTER: Primary | ICD-10-CM

## 2022-03-10 DIAGNOSIS — O09.813 PREGNANCY RESULTING FROM IN VITRO FERTILIZATION, THIRD TRIMESTER: Primary | ICD-10-CM

## 2022-03-10 PROCEDURE — 76816 OB US FOLLOW-UP PER FETUS: CPT | Performed by: OBSTETRICS & GYNECOLOGY

## 2022-03-10 PROCEDURE — 59025 FETAL NON-STRESS TEST: CPT | Performed by: OBSTETRICS & GYNECOLOGY

## 2022-03-10 PROCEDURE — NC001 PR NO CHARGE: Performed by: OBSTETRICS & GYNECOLOGY

## 2022-03-10 NOTE — LETTER
March 10, 2022     [address] [place] [address]  [facility]    Patient: Farhana Smith   YOB: 1990   Date of Visit: 3/10/2022       Dear Dr Navya Pappas: Thank you for referring Farhana Smith to me for evaluation  Below are my notes for this consultation  If you have questions, please do not hesitate to call me  I look forward to following your patient along with you  Sincerely,        Jose A Lyles MD        CC: No Recipients  Jose A Lyles MD  3/9/2022  7:59 PM  Sign when Signing Visit  Please refer to the [facility] ultrasound report in Ob Procedures for additional information regarding today's visit

## 2022-03-10 NOTE — PROGRESS NOTES
Nonstress testing was performed for the indications of gestational diabetes and in-vitro fertilization which was reactive

## 2022-03-13 ENCOUNTER — HOSPITAL ENCOUNTER (INPATIENT)
Facility: HOSPITAL | Age: 32
LOS: 3 days | Discharge: HOME | End: 2022-03-16
Attending: OBSTETRICS & GYNECOLOGY | Admitting: OBSTETRICS & GYNECOLOGY
Payer: COMMERCIAL

## 2022-03-13 ENCOUNTER — HOSPITAL ENCOUNTER (OUTPATIENT)
Dept: LABOR AND DELIVERY | Facility: HOSPITAL | Age: 32
Discharge: HOME | End: 2022-03-13
Payer: COMMERCIAL

## 2022-03-13 PROBLEM — Z3A.39 39 WEEKS GESTATION OF PREGNANCY: Status: ACTIVE | Noted: 2021-09-07

## 2022-03-13 LAB
ABO GROUP BLD: NORMAL
ALBUMIN SERPL BCP-MCNC: 2.5 G/DL (ref 3.5–5)
ALP SERPL-CCNC: 229 U/L (ref 46–116)
ALT SERPL W P-5'-P-CCNC: 14 U/L (ref 12–78)
ANION GAP SERPL CALCULATED.3IONS-SCNC: 10 MMOL/L (ref 4–13)
AST SERPL W P-5'-P-CCNC: 23 U/L (ref 5–45)
BILIRUB SERPL-MCNC: 0.32 MG/DL (ref 0.2–1)
BLD GP AB SCN SERPL QL: NEGATIVE
BUN SERPL-MCNC: 11 MG/DL (ref 5–25)
CALCIUM ALBUM COR SERPL-MCNC: 10 MG/DL (ref 8.3–10.1)
CALCIUM SERPL-MCNC: 8.8 MG/DL (ref 8.3–10.1)
CHLORIDE SERPL-SCNC: 107 MMOL/L (ref 100–108)
CO2 SERPL-SCNC: 20 MMOL/L (ref 21–32)
CREAT SERPL-MCNC: 0.8 MG/DL (ref 0.6–1.3)
CREAT UR-MCNC: 65.4 MG/DL
ERYTHROCYTE [DISTWIDTH] IN BLOOD BY AUTOMATED COUNT: 15 % (ref 11.6–15.1)
GFR SERPL CREATININE-BSD FRML MDRD: 99 ML/MIN/1.73SQ M
GLUCOSE SERPL-MCNC: 102 MG/DL (ref 65–140)
GLUCOSE SERPL-MCNC: 108 MG/DL (ref 65–140)
HCT VFR BLD AUTO: 37.6 % (ref 34.8–46.1)
HGB BLD-MCNC: 12.7 G/DL (ref 11.5–15.4)
MCH RBC QN AUTO: 31.3 PG (ref 26.8–34.3)
MCHC RBC AUTO-ENTMCNC: 33.8 G/DL (ref 31.4–37.4)
MCV RBC AUTO: 93 FL (ref 82–98)
PLATELET # BLD AUTO: 217 THOUSANDS/UL (ref 149–390)
PMV BLD AUTO: 11.1 FL (ref 8.9–12.7)
POTASSIUM SERPL-SCNC: 3.4 MMOL/L (ref 3.5–5.3)
PROT SERPL-MCNC: 6.6 G/DL (ref 6.4–8.2)
PROT UR-MCNC: 20 MG/DL
PROT/CREAT UR: 0.31 MG/G{CREAT} (ref 0–0.1)
RBC # BLD AUTO: 4.06 MILLION/UL (ref 3.81–5.12)
RH BLD: POSITIVE
SODIUM SERPL-SCNC: 137 MMOL/L (ref 136–145)
SPECIMEN EXPIRATION DATE: NORMAL
WBC # BLD AUTO: 9.34 THOUSAND/UL (ref 4.31–10.16)

## 2022-03-13 PROCEDURE — 84156 ASSAY OF PROTEIN URINE: CPT | Performed by: OBSTETRICS & GYNECOLOGY

## 2022-03-13 PROCEDURE — 4A1HXCZ MONITORING OF PRODUCTS OF CONCEPTION, CARDIAC RATE, EXTERNAL APPROACH: ICD-10-PCS | Performed by: OBSTETRICS & GYNECOLOGY

## 2022-03-13 PROCEDURE — 85027 COMPLETE CBC AUTOMATED: CPT | Performed by: OBSTETRICS & GYNECOLOGY

## 2022-03-13 PROCEDURE — 86901 BLOOD TYPING SEROLOGIC RH(D): CPT | Performed by: OBSTETRICS & GYNECOLOGY

## 2022-03-13 PROCEDURE — 86900 BLOOD TYPING SEROLOGIC ABO: CPT | Performed by: OBSTETRICS & GYNECOLOGY

## 2022-03-13 PROCEDURE — NC001 PR NO CHARGE: Performed by: OBSTETRICS & GYNECOLOGY

## 2022-03-13 PROCEDURE — 3E0DXGC INTRODUCTION OF OTHER THERAPEUTIC SUBSTANCE INTO MOUTH AND PHARYNX, EXTERNAL APPROACH: ICD-10-PCS | Performed by: OBSTETRICS & GYNECOLOGY

## 2022-03-13 PROCEDURE — 86850 RBC ANTIBODY SCREEN: CPT | Performed by: OBSTETRICS & GYNECOLOGY

## 2022-03-13 PROCEDURE — 80053 COMPREHEN METABOLIC PANEL: CPT | Performed by: OBSTETRICS & GYNECOLOGY

## 2022-03-13 PROCEDURE — 86592 SYPHILIS TEST NON-TREP QUAL: CPT | Performed by: OBSTETRICS & GYNECOLOGY

## 2022-03-13 PROCEDURE — 82948 REAGENT STRIP/BLOOD GLUCOSE: CPT

## 2022-03-13 PROCEDURE — 82570 ASSAY OF URINE CREATININE: CPT | Performed by: OBSTETRICS & GYNECOLOGY

## 2022-03-13 RX ORDER — BUTORPHANOL TARTRATE 1 MG/ML
1 INJECTION, SOLUTION INTRAMUSCULAR; INTRAVENOUS ONCE
Status: COMPLETED | OUTPATIENT
Start: 2022-03-13 | End: 2022-03-13

## 2022-03-13 RX ORDER — SODIUM CHLORIDE 9 MG/ML
125 INJECTION, SOLUTION INTRAVENOUS CONTINUOUS
Status: DISCONTINUED | OUTPATIENT
Start: 2022-03-13 | End: 2022-03-16 | Stop reason: HOSPADM

## 2022-03-13 RX ORDER — INSULIN GLARGINE 100 [IU]/ML
10 INJECTION, SOLUTION SUBCUTANEOUS ONCE
Status: COMPLETED | OUTPATIENT
Start: 2022-03-13 | End: 2022-03-13

## 2022-03-13 RX ORDER — ONDANSETRON 2 MG/ML
4 INJECTION INTRAMUSCULAR; INTRAVENOUS EVERY 6 HOURS PRN
Status: DISCONTINUED | OUTPATIENT
Start: 2022-03-13 | End: 2022-03-15

## 2022-03-13 RX ORDER — SODIUM CHLORIDE, SODIUM LACTATE, POTASSIUM CHLORIDE, CALCIUM CHLORIDE 600; 310; 30; 20 MG/100ML; MG/100ML; MG/100ML; MG/100ML
125 INJECTION, SOLUTION INTRAVENOUS CONTINUOUS
Status: DISCONTINUED | OUTPATIENT
Start: 2022-03-13 | End: 2022-03-13

## 2022-03-13 RX ORDER — PROMETHAZINE HYDROCHLORIDE 25 MG/ML
25 INJECTION, SOLUTION INTRAMUSCULAR; INTRAVENOUS EVERY 6 HOURS PRN
Status: DISCONTINUED | OUTPATIENT
Start: 2022-03-13 | End: 2022-03-14

## 2022-03-13 RX ADMIN — Medication 25 MCG: at 21:22

## 2022-03-13 RX ADMIN — SODIUM CHLORIDE 125 ML/HR: 9 INJECTION, SOLUTION INTRAVENOUS at 21:19

## 2022-03-13 RX ADMIN — PROMETHAZINE HYDROCHLORIDE 25 MG: 25 INJECTION INTRAMUSCULAR; INTRAVENOUS at 21:24

## 2022-03-13 RX ADMIN — INSULIN GLARGINE 10 UNITS: 100 INJECTION, SOLUTION SUBCUTANEOUS at 21:22

## 2022-03-13 RX ADMIN — BUTORPHANOL TARTRATE 1 MG: 1 INJECTION, SOLUTION INTRAMUSCULAR; INTRAVENOUS at 21:20

## 2022-03-14 ENCOUNTER — ANESTHESIA EVENT (INPATIENT)
Dept: LABOR AND DELIVERY | Facility: HOSPITAL | Age: 32
End: 2022-03-14
Payer: COMMERCIAL

## 2022-03-14 ENCOUNTER — ANESTHESIA (INPATIENT)
Dept: LABOR AND DELIVERY | Facility: HOSPITAL | Age: 32
End: 2022-03-14
Payer: COMMERCIAL

## 2022-03-14 PROBLEM — O11.9 CHRONIC HYPERTENSION WITH SUPERIMPOSED PREECLAMPSIA: Status: ACTIVE | Noted: 2021-11-02

## 2022-03-14 LAB
BASE EXCESS BLDCOA CALC-SCNC: -5.4 MMOL/L (ref 3–11)
BASE EXCESS BLDCOV CALC-SCNC: -7.3 MMOL/L (ref 1–9)
GLUCOSE SERPL-MCNC: 103 MG/DL (ref 65–140)
GLUCOSE SERPL-MCNC: 64 MG/DL (ref 65–140)
GLUCOSE SERPL-MCNC: 66 MG/DL (ref 65–140)
GLUCOSE SERPL-MCNC: 68 MG/DL (ref 65–140)
GLUCOSE SERPL-MCNC: 74 MG/DL (ref 65–140)
GLUCOSE SERPL-MCNC: 79 MG/DL (ref 65–140)
GLUCOSE SERPL-MCNC: 81 MG/DL (ref 65–140)
GLUCOSE SERPL-MCNC: 86 MG/DL (ref 65–140)
GLUCOSE SERPL-MCNC: 91 MG/DL (ref 65–140)
GLUCOSE SERPL-MCNC: 94 MG/DL (ref 65–140)
GLUCOSE SERPL-MCNC: 98 MG/DL (ref 65–140)
HCO3 BLDCOA-SCNC: 19.3 MMOL/L (ref 17.3–27.3)
HCO3 BLDCOV-SCNC: 16.9 MMOL/L (ref 12.2–28.6)
O2 CT VFR BLDCOA CALC: 15.8 ML/DL
OXYHGB MFR BLDCOA: 83.4 %
OXYHGB MFR BLDCOV: 84.3 %
PCO2 BLDCOA: 35.3 MM[HG] (ref 30–60)
PCO2 BLDCOV: 30.6 MM HG (ref 27–43)
PH BLDCOA: 7.36 [PH] (ref 7.23–7.43)
PH BLDCOV: 7.36 [PH] (ref 7.19–7.49)
PO2 BLDCOA: 40.3 MM HG (ref 5–25)
PO2 BLDCOV: 40.9 MM HG (ref 15–45)
RPR SER QL: NORMAL
SAO2 % BLDCOV: 15.9 ML/DL

## 2022-03-14 PROCEDURE — 99024 POSTOP FOLLOW-UP VISIT: CPT | Performed by: OBSTETRICS & GYNECOLOGY

## 2022-03-14 PROCEDURE — NC001 PR NO CHARGE: Performed by: OBSTETRICS & GYNECOLOGY

## 2022-03-14 PROCEDURE — 59510 CESAREAN DELIVERY: CPT | Performed by: OBSTETRICS & GYNECOLOGY

## 2022-03-14 PROCEDURE — 82948 REAGENT STRIP/BLOOD GLUCOSE: CPT

## 2022-03-14 PROCEDURE — 10907ZC DRAINAGE OF AMNIOTIC FLUID, THERAPEUTIC FROM PRODUCTS OF CONCEPTION, VIA NATURAL OR ARTIFICIAL OPENING: ICD-10-PCS | Performed by: OBSTETRICS & GYNECOLOGY

## 2022-03-14 PROCEDURE — 3E033VJ INTRODUCTION OF OTHER HORMONE INTO PERIPHERAL VEIN, PERCUTANEOUS APPROACH: ICD-10-PCS | Performed by: OBSTETRICS & GYNECOLOGY

## 2022-03-14 PROCEDURE — 82805 BLOOD GASES W/O2 SATURATION: CPT | Performed by: OBSTETRICS & GYNECOLOGY

## 2022-03-14 RX ORDER — OXYTOCIN/RINGER'S LACTATE 30/500 ML
1-30 PLASTIC BAG, INJECTION (ML) INTRAVENOUS
Status: DISCONTINUED | OUTPATIENT
Start: 2022-03-14 | End: 2022-03-15

## 2022-03-14 RX ORDER — CEFAZOLIN SODIUM 2 G/50ML
2000 SOLUTION INTRAVENOUS ONCE
Status: COMPLETED | OUTPATIENT
Start: 2022-03-14 | End: 2022-03-14

## 2022-03-14 RX ORDER — ROPIVACAINE HYDROCHLORIDE 5 MG/ML
INJECTION, SOLUTION EPIDURAL; INFILTRATION; PERINEURAL AS NEEDED
Status: DISCONTINUED | OUTPATIENT
Start: 2022-03-14 | End: 2022-03-14

## 2022-03-14 RX ORDER — MIDAZOLAM HYDROCHLORIDE 2 MG/2ML
INJECTION, SOLUTION INTRAMUSCULAR; INTRAVENOUS AS NEEDED
Status: DISCONTINUED | OUTPATIENT
Start: 2022-03-14 | End: 2022-03-14

## 2022-03-14 RX ORDER — FENTANYL CITRATE 50 UG/ML
INJECTION, SOLUTION INTRAMUSCULAR; INTRAVENOUS AS NEEDED
Status: DISCONTINUED | OUTPATIENT
Start: 2022-03-14 | End: 2022-03-14

## 2022-03-14 RX ORDER — ROPIVACAINE HYDROCHLORIDE 2 MG/ML
INJECTION, SOLUTION EPIDURAL; INFILTRATION; PERINEURAL
Status: COMPLETED
Start: 2022-03-14 | End: 2022-03-14

## 2022-03-14 RX ORDER — ONDANSETRON 2 MG/ML
INJECTION INTRAMUSCULAR; INTRAVENOUS AS NEEDED
Status: DISCONTINUED | OUTPATIENT
Start: 2022-03-14 | End: 2022-03-14

## 2022-03-14 RX ORDER — ROPIVACAINE HYDROCHLORIDE 2 MG/ML
INJECTION, SOLUTION EPIDURAL; INFILTRATION; PERINEURAL
Status: COMPLETED | OUTPATIENT
Start: 2022-03-14 | End: 2022-03-14

## 2022-03-14 RX ORDER — ROPIVACAINE HYDROCHLORIDE 2 MG/ML
INJECTION, SOLUTION EPIDURAL; INFILTRATION; PERINEURAL CONTINUOUS PRN
Status: DISCONTINUED | OUTPATIENT
Start: 2022-03-14 | End: 2022-03-14

## 2022-03-14 RX ORDER — KETAMINE HYDROCHLORIDE 50 MG/ML
INJECTION, SOLUTION, CONCENTRATE INTRAMUSCULAR; INTRAVENOUS AS NEEDED
Status: DISCONTINUED | OUTPATIENT
Start: 2022-03-14 | End: 2022-03-14

## 2022-03-14 RX ORDER — LIDOCAINE HYDROCHLORIDE AND EPINEPHRINE 20; 5 MG/ML; UG/ML
INJECTION, SOLUTION EPIDURAL; INFILTRATION; INTRACAUDAL; PERINEURAL AS NEEDED
Status: DISCONTINUED | OUTPATIENT
Start: 2022-03-14 | End: 2022-03-14

## 2022-03-14 RX ORDER — MORPHINE SULFATE 1 MG/ML
INJECTION, SOLUTION EPIDURAL; INTRATHECAL; INTRAVENOUS AS NEEDED
Status: DISCONTINUED | OUTPATIENT
Start: 2022-03-14 | End: 2022-03-14

## 2022-03-14 RX ORDER — MORPHINE SULFATE 0.5 MG/ML
INJECTION, SOLUTION EPIDURAL; INTRATHECAL; INTRAVENOUS
Status: DISPENSED
Start: 2022-03-14 | End: 2022-03-15

## 2022-03-14 RX ORDER — FENTANYL CITRATE 50 UG/ML
INJECTION, SOLUTION INTRAMUSCULAR; INTRAVENOUS
Status: COMPLETED
Start: 2022-03-14 | End: 2022-03-14

## 2022-03-14 RX ORDER — MIDAZOLAM HYDROCHLORIDE 2 MG/2ML
INJECTION, SOLUTION INTRAMUSCULAR; INTRAVENOUS
Status: COMPLETED
Start: 2022-03-14 | End: 2022-03-14

## 2022-03-14 RX ORDER — PROMETHAZINE HYDROCHLORIDE 25 MG/ML
25 INJECTION, SOLUTION INTRAMUSCULAR; INTRAVENOUS EVERY 6 HOURS PRN
Status: DISCONTINUED | OUTPATIENT
Start: 2022-03-14 | End: 2022-03-15

## 2022-03-14 RX ORDER — KETAMINE HCL IN NACL, ISO-OSM 100MG/10ML
SYRINGE (ML) INJECTION
Status: DISPENSED
Start: 2022-03-14 | End: 2022-03-15

## 2022-03-14 RX ADMIN — MIDAZOLAM 2 MG: 1 INJECTION INTRAMUSCULAR; INTRAVENOUS at 23:26

## 2022-03-14 RX ADMIN — FENTANYL CITRATE 100 MCG: 50 INJECTION INTRAMUSCULAR; INTRAVENOUS at 16:54

## 2022-03-14 RX ADMIN — ONDANSETRON 4 MG: 2 INJECTION INTRAMUSCULAR; INTRAVENOUS at 21:56

## 2022-03-14 RX ADMIN — Medication 500 MG: at 21:51

## 2022-03-14 RX ADMIN — LIDOCAINE HYDROCHLORIDE AND EPINEPHRINE 5 ML: 20; 5 INJECTION, SOLUTION EPIDURAL; INFILTRATION; INTRACAUDAL; PERINEURAL at 22:57

## 2022-03-14 RX ADMIN — LIDOCAINE HYDROCHLORIDE AND EPINEPHRINE 3 ML: 20; 5 INJECTION, SOLUTION EPIDURAL; INFILTRATION; INTRACAUDAL; PERINEURAL at 15:14

## 2022-03-14 RX ADMIN — LIDOCAINE HYDROCHLORIDE AND EPINEPHRINE 3 ML: 20; 5 INJECTION, SOLUTION EPIDURAL; INFILTRATION; INTRACAUDAL; PERINEURAL at 21:48

## 2022-03-14 RX ADMIN — LIDOCAINE HYDROCHLORIDE AND EPINEPHRINE 5 ML: 20; 5 INJECTION, SOLUTION EPIDURAL; INFILTRATION; INTRACAUDAL; PERINEURAL at 20:46

## 2022-03-14 RX ADMIN — KETAMINE HYDROCHLORIDE 20 MG: 50 INJECTION INTRAMUSCULAR; INTRAVENOUS at 23:25

## 2022-03-14 RX ADMIN — SODIUM CHLORIDE 125 ML/HR: 9 INJECTION, SOLUTION INTRAVENOUS at 13:18

## 2022-03-14 RX ADMIN — SODIUM CHLORIDE 999 ML/HR: 9 INJECTION, SOLUTION INTRAVENOUS at 04:00

## 2022-03-14 RX ADMIN — LIDOCAINE HYDROCHLORIDE AND EPINEPHRINE 5 ML: 20; 5 INJECTION, SOLUTION EPIDURAL; INFILTRATION; INTRACAUDAL; PERINEURAL at 21:37

## 2022-03-14 RX ADMIN — ROPIVACAINE HYDROCHLORIDE 10 ML/HR: 2 INJECTION, SOLUTION EPIDURAL; INFILTRATION; PERINEURAL at 05:04

## 2022-03-14 RX ADMIN — ONDANSETRON 4 MG: 2 INJECTION INTRAMUSCULAR; INTRAVENOUS at 20:34

## 2022-03-14 RX ADMIN — MIDAZOLAM 2 MG: 1 INJECTION INTRAMUSCULAR; INTRAVENOUS at 23:20

## 2022-03-14 RX ADMIN — SODIUM CHLORIDE 125 ML/HR: 9 INJECTION, SOLUTION INTRAVENOUS at 05:18

## 2022-03-14 RX ADMIN — LIDOCAINE HYDROCHLORIDE AND EPINEPHRINE 3 ML: 20; 5 INJECTION, SOLUTION EPIDURAL; INFILTRATION; INTRACAUDAL; PERINEURAL at 23:02

## 2022-03-14 RX ADMIN — ROPIVACAINE HYDROCHLORIDE 8 ML: 5 INJECTION, SOLUTION EPIDURAL; INFILTRATION; PERINEURAL at 16:54

## 2022-03-14 RX ADMIN — KETAMINE HYDROCHLORIDE 10 MG: 50 INJECTION INTRAMUSCULAR; INTRAVENOUS at 23:26

## 2022-03-14 RX ADMIN — LIDOCAINE HYDROCHLORIDE AND EPINEPHRINE 5 ML: 20; 5 INJECTION, SOLUTION EPIDURAL; INFILTRATION; INTRACAUDAL; PERINEURAL at 12:20

## 2022-03-14 RX ADMIN — ROPIVACAINE HYDROCHLORIDE 10 ML: 2 INJECTION, SOLUTION EPIDURAL; INFILTRATION; PERINEURAL at 05:09

## 2022-03-14 RX ADMIN — SODIUM CHLORIDE 125 ML/HR: 9 INJECTION, SOLUTION INTRAVENOUS at 20:46

## 2022-03-14 RX ADMIN — LIDOCAINE HYDROCHLORIDE AND EPINEPHRINE 2 ML: 20; 5 INJECTION, SOLUTION EPIDURAL; INFILTRATION; INTRACAUDAL; PERINEURAL at 23:07

## 2022-03-14 RX ADMIN — KETAMINE HYDROCHLORIDE 20 MG: 50 INJECTION INTRAMUSCULAR; INTRAVENOUS at 23:20

## 2022-03-14 RX ADMIN — CEFAZOLIN SODIUM 2000 MG: 2 SOLUTION INTRAVENOUS at 21:45

## 2022-03-14 RX ADMIN — Medication 2 MILLI-UNITS/MIN: at 02:29

## 2022-03-14 RX ADMIN — ROPIVACAINE HYDROCHLORIDE: 2 INJECTION, SOLUTION EPIDURAL; INFILTRATION at 05:10

## 2022-03-14 RX ADMIN — LIDOCAINE HYDROCHLORIDE AND EPINEPHRINE 2 ML: 20; 5 INJECTION, SOLUTION EPIDURAL; INFILTRATION; INTRACAUDAL; PERINEURAL at 22:52

## 2022-03-14 RX ADMIN — LIDOCAINE HYDROCHLORIDE AND EPINEPHRINE 5 ML: 20; 5 INJECTION, SOLUTION EPIDURAL; INFILTRATION; INTRACAUDAL; PERINEURAL at 15:10

## 2022-03-14 RX ADMIN — MORPHINE SULFATE 3 MG: 1 INJECTION, SOLUTION EPIDURAL; INTRATHECAL; INTRAVENOUS at 23:22

## 2022-03-14 RX ADMIN — SODIUM CHLORIDE: 9 INJECTION, SOLUTION INTRAVENOUS at 22:57

## 2022-03-14 RX ADMIN — LIDOCAINE HYDROCHLORIDE AND EPINEPHRINE 5 ML: 20; 5 INJECTION, SOLUTION EPIDURAL; INFILTRATION; INTRACAUDAL; PERINEURAL at 21:25

## 2022-03-14 NOTE — OB LABOR/OXYTOCIN SAFETY PROGRESS
Oxytocin Safety Progress Check Note - Odellsaadia Medina 28 y o  female MRN: [id number]    Unit/Bed#: L&D 325-01 Encounter: [id number]    Dose (kwadwo-units/min) Oxytocin: 4 kwadwo-units/min  Contraction Frequency (minutes): 2-3 (irritability present)  Contraction Quality: Moderate  Tachysystole: No   Cervical Dilation: 5        Cervical Effacement: 70  Fetal Station: -3  Baseline Rate: 135 bpm  Fetal Heart Rate: 130 BPM  FHR Category: Category I           Vital Signs:   Vitals:    03/14/22 0946   BP: 113/65   Pulse: 68   Resp:    Temp:    SpO2:      Notes/comments:   Pt is feeling pain on her right lower abdomen  She is using PCA at this time  FB dislodged  SVE as above  FHT cat 1 with contractions q2-3 mins  Will allow patient to have pain relief at this time with plan to AROM after she is more comfortable       Dr Truman Alegre aware     Corrine Ventura MD 3/14/2022 9:56 AM

## 2022-03-14 NOTE — ASSESSMENT & PLAN NOTE
Most recent pregnancy regimen was Semglee 20 units at bedtime  Will give Lantus 10 units tonight  Follow up finger sticks per protocol

## 2022-03-14 NOTE — OB LABOR/OXYTOCIN SAFETY PROGRESS
Oxytocin Safety Progress Check Note - Jose Angel Medina 28 y o  female MRN: [id number]    Unit/Bed#: L&D 325-01 Encounter: [id number]    Dose (kwadwo-units/min) Oxytocin: 4 kwadwo-units/min  Contraction Frequency (minutes): (P) 1 5-2  Contraction Quality: (P) Moderate  Tachysystole: (P) No   Cervical Dilation: 6        Cervical Effacement: 80  Fetal Station: -2  Baseline Rate: (P) 125 bpm  Fetal Heart Rate: 130 BPM  FHR Category: Category I           Vital Signs:   Vitals:    03/14/22 1500   BP: 135/87   Pulse: 80   Resp:    Temp:    SpO2:        Notes/comments:   Pt is feeling increasing pain despite using PCA  SVE as above and unchanged from prior exam   FHT cat 1 with contractions q1-3 mins  Anesthesia team to re-bolus at this time for improved pain control      Dr Winter Quarles aware      Wynonia Hatchet, MD 3/14/2022 3:12 PM

## 2022-03-14 NOTE — PROGRESS NOTES
Pt is much more comfortable with the epidural   We had a long discussion about the size of the baby and the risk of should dystocia    We spoke of signs to watch for such as failure to dilate or descend   Cat 2 tracing as the baby can not navigate through the pelvis     We went over the risk and possible injury to the baby with a shoulder    They both show understanding   Think that when she becomes fully dilated to allow the baby to labor down prior to pushing     I do not recommend an operative delivery       Last growth 3/10/22    MEASUREMENTS (* Included In Average GA)     AC              37 5 cm        41 weeks 3 days* (>97%)  BPD              9 5 cm        38 weeks 6 days* (83%)  HC              34 8 cm        40 weeks 3 days* (76%)  Femur            7 3 cm        37 weeks 3 days* (24%)     Cerebellum       5 5 cm        38 weeks 1 day     HC/AC           0 93 [0 92 - 1 05]                 (18%)  FL/AC             19 [20 - 24]  FL/BPD            77 [71 - 87]  EFW Hadlock 4   4021 grams - 8 lbs 14 oz                 (92%)

## 2022-03-14 NOTE — ANESTHESIA PROCEDURE NOTES
Epidural Block    Patient location during procedure: OB  Start time: 3/14/2022 5:01 AM  Reason for block: at surgeon's request and primary anesthetic  Staffing  Performed: Anesthesiologist   Anesthesiologist: Cherrie Hickey MD  Preanesthetic Checklist  Completed: patient identified, IV checked, site marked, risks and benefits discussed, surgical consent, monitors and equipment checked, pre-op evaluation and timeout performed  Epidural  Patient position: sitting  Prep: Betadine  Patient monitoring: frequent blood pressure checks  Approach: midline  Location: lumbar  Injection technique: JAQUELINE saline  Needle  Needle type: Tuohy   Needle gauge: 18 G  Catheter type: side hole  Catheter size: 20 G  Catheter securement method: clear occlusive dressing  Test dose: negativeropivacaine (NAROPIN) 0 2% epidural injection, 10 mL  Assessment  Sensory level: T10  Number of attempts: 1negative aspiration for CSF, negative aspiration for heme and no paresthesia on injection  patient tolerated the procedure well with no immediate complications

## 2022-03-14 NOTE — PLAN OF CARE
Problem: PAIN - ADULT  Goal: Verbalizes/displays adequate comfort level or baseline comfort level  Description: Interventions:  - Encourage patient to monitor pain and request assistance  - Assess pain using appropriate pain scale  - Administer analgesics based on type and severity of pain and evaluate response  - Implement non-pharmacological measures as appropriate and evaluate response  - Consider cultural and social influences on pain and pain management  - Notify physician/advanced practitioner if interventions unsuccessful or patient reports new pain  Outcome: Progressing     Problem: INFECTION - ADULT  Goal: Absence or prevention of progression during hospitalization  Description: INTERVENTIONS:  - Assess and monitor for signs and symptoms of infection  - Monitor lab/diagnostic results  - Monitor all insertion sites, i e  indwelling lines, tubes, and drains  - Monitor endotracheal if appropriate and nasal secretions for changes in amount and color  - Floweree appropriate cooling/warming therapies per order  - Administer medications as ordered  - Instruct and encourage patient and family to use good hand hygiene technique  - Identify and instruct in appropriate isolation precautions for identified infection/condition  Outcome: Progressing  Goal: Absence of fever/infection during neutropenic period  Description: INTERVENTIONS:  - Monitor WBC    Outcome: Progressing     Problem: SAFETY ADULT  Goal: Patient will remain free of falls  Description: INTERVENTIONS:  - Educate patient/family on patient safety including physical limitations  - Instruct patient to call for assistance with activity   - Consult OT/PT to assist with strengthening/mobility   - Keep Call bell within reach  - Keep bed low and locked with side rails adjusted as appropriate  - Keep care items and personal belongings within reach  - Initiate and maintain comfort rounds  - Make Fall Risk Sign visible to staff  - Offer Toileting every  Hours, in advance of need  - Initiate/Maintain alarm  - Obtain necessary fall risk management equipment:   - Apply yellow socks and bracelet for high fall risk patients  - Consider moving patient to room near nurses station  Outcome: Progressing  Goal: Maintain or return to baseline ADL function  Description: INTERVENTIONS:  -  Assess patient's ability to carry out ADLs; assess patient's baseline for ADL function and identify physical deficits which impact ability to perform ADLs (bathing, care of mouth/teeth, toileting, grooming, dressing, etc )  - Assess/evaluate cause of self-care deficits   - Assess range of motion  - Assess patient's mobility; develop plan if impaired  - Assess patient's need for assistive devices and provide as appropriate  - Encourage maximum independence but intervene and supervise when necessary  - Involve family in performance of ADLs  - Assess for home care needs following discharge   - Consider OT consult to assist with ADL evaluation and planning for discharge  - Provide patient education as appropriate  Outcome: Progressing  Goal: Maintains/Returns to pre admission functional level  Description: INTERVENTIONS:  - Perform BMAT or MOVE assessment daily    - Set and communicate daily mobility goal to care team and patient/family/caregiver  - Collaborate with rehabilitation services on mobility goals if consulted  - Perform Range of Motion  times a day  - Reposition patient every  hours    - Dangle patient  times a day  - Stand patient  times a day  - Ambulate patient  times a day  - Out of bed to chair  times a day   - Out of bed for meals   times a day  - Out of bed for toileting  - Record patient progress and toleration of activity level   Outcome: Progressing     Problem: Knowledge Deficit  Goal: Patient/family/caregiver demonstrates understanding of disease process, treatment plan, medications, and discharge instructions  Description: Complete learning assessment and assess knowledge base   Interventions:  - Provide teaching at level of understanding  - Provide teaching via preferred learning methods  Outcome: Progressing  Goal: Verbalizes understanding of labor plan  Description: Assess patient/family/caregiver's baseline knowledge level and ability to understand information  Provide education via patient/family/caregiver's preferred learning method at appropriate level of understanding  1  Provide teaching at level of understanding  2  Provide teaching via preferred learning method(s)  Outcome: Progressing     Problem: DISCHARGE PLANNING  Goal: Discharge to home or other facility with appropriate resources  Description: INTERVENTIONS:  - Identify barriers to discharge w/patient and caregiver  - Arrange for needed discharge resources and transportation as appropriate  - Identify discharge learning needs (meds, wound care, etc )  - Arrange for interpretive services to assist at discharge as needed  - Refer to Case Management Department for coordinating discharge planning if the patient needs post-hospital services based on physician/advanced practitioner order or complex needs related to functional status, cognitive ability, or social support system  Outcome: Progressing     Problem: Labor & Delivery  Goal: Manages discomfort  Description: Assess and monitor for signs and symptoms of discomfort  Assess patient's pain level regularly and per hospital policy  Administer medications as ordered  Support use of nonpharmacological methods to help control pain such as distraction, imagery, relaxation, and application of heat and cold  Collaborate with interdisciplinary team and patient to determine appropriate pain management plan  1  Include patient in decisions related to comfort  2  Offer non-pharmacological pain management interventions  3  Report ineffective pain management to physician  Outcome: Progressing  Goal: Patient vital signs are stable  Description: 1   Assess vital signs - vaginal delivery    Outcome: Progressing

## 2022-03-14 NOTE — ASSESSMENT & PLAN NOTE
Met criteria on admission due to P/C ratio of 0 31   CBC and CMP within normal limits  Asymptomatic   Continue to monitor

## 2022-03-14 NOTE — H&P
H&P - Obstetrics   Wilfredo Vaughan 28 y o  female MRN: [id number]  Unit/Bed#: L&D 325-01 Encounter: [id number]    Assessment and Plan:  28 y o   who is being admitted for induction of labor  By issue:    Insulin controlled gestational diabetes mellitus (GDM) in third trimester  Assessment & Plan  Most recent pregnancy regimen was Semglee 20 units at bedtime  Will give Lantus 10 units tonight  Follow up finger sticks per protocol    Chronic hypertension with superimposed preeclampsia  Assessment & Plan  Met criteria on admission due to P/C ratio of 0 31  CBC and CMP within normal limits  Asymptomatic   Continue to monitor    * 39 weeks gestation of pregnancy  Assessment & Plan  Vertex by ultrasound  Clinical EFW by Leopold[facility]: 8 5lbs  Follow up RPR, blood type & antibody screen  Plan for cervical ripening with Misoprostol, velásquez balloon  GBS negative status - no antibiotic prophylaxis indicated  Analgesia and/or epidural at patient request        Plan of care discussed with Dr Nadeen Johns  This patient will be an INPATIENT and I certify the anticipated length of stay is >2 Midnights  History of Present Illness     Chief Complaint: Induction of labor    History of Present Illness:  Wilfredo Vaughan is a 28 y o   with an GUILLE of 3/19/2022, by Embryo Transfer at 39w1d weeks gestation who presents for scheduled induction of labor  Denies contractions, vaginal bleeding or leakage of fluid  Feeling normal fetal movement  Also denies headache, vision changes, chest pain or shortness  Has swelling in lower extremities, worse at ankle but no pain  Nervous about the induction process  Did not take any insulin tonight and ate dinner before coming in  ROS otherwise negative  Obstetrician: [facility]    Pregnancy complications:   1  IVF pregnancy  2  Chronic hypertension  3   Gestational diabetes, A2    OB History    Para Term  AB Living   1             SAB IAB Ectopic Multiple Live Births # Outcome Date GA Lbr Carlos/2nd Weight Sex Delivery Anes PTL Lv   1 Current                Baby complications/comments: vertex  EFW 4021 grams - 8 lbs 14 oz (92%) March 10th    Review of Systems  12-point ROS negative unless stated in HPI  Historical Information   Past Medical History:   Diagnosis Date    Abnormal Pap smear of cervix     Chronic hypertension affecting pregnancy     Female infertility     Gestational diabetes     HPV in female     Kidney stone     Varicella      Past Surgical History:   Procedure Laterality Date    COLPOSCOPY      EXPLORATORY LAPAROTOMY      HYSTEROSCOPY W/ POLYPECTOMY      KIDNEY STONE SURGERY      TONSILLECTOMY      WISDOM TOOTH EXTRACTION       Social History   Social History     Substance and Sexual Activity   Alcohol Use Not Currently    Comment: occasionally prior to pregnancy only     Social History     Substance and Sexual Activity   Drug Use Never     Social History     Tobacco Use   Smoking Status Never Smoker   Smokeless Tobacco Never Used     Family History: non-contributory    Meds/Allergies      Medications Prior to Admission   Medication    Blood Glucose Monitoring Suppl (Contour Next EZ) w/Device KIT    Contour Next Test test strip    famotidine (PEPCID) 10 mg tablet    ferrous sulfate 325 (65 Fe) mg tablet    insulin glargine (Semglee) 100 units/mL injection pen    Insulin Pen Needle 31G X 5 MM MISC    Microlet Lancets MISC    Prenatal MV-Min-Fe Fum-FA-DHA (PRENATAL 1 PO)    Semglee, yfgn, 100 UNIT/ML SOPN    Doxylamine-Pyridoxine ER (Bonjesta) 20-20 MG TBCR        Allergies   Allergen Reactions    Sulfamethoxazole-Trimethoprim Rash       Objective:  Vitals: Blood pressure 128/75, pulse 78, temperature 98 °F (36 7 °C), temperature source Oral, resp  rate 18, height 5' 4" (1 626 m), weight 83 9 kg (185 lb)  Body mass index is 31 76 kg/m²      Physical Exam  GEN: alert and oriented x 3, no apparent distress, appears well  CARDIAC: regular rate and rhythm  PULMONARY: clear to auscultation bilaterally  ABDOMEN: gravid, soft, no tenderness  GENITOURINARY: normal external female genitalia   Initial cervical exam 1/50/-3, medium consistency and posterior   EXTREMITIES: nontender, ankle edema bilaterally  FETAL ASSESSMENT:  Fetal heart rate: 120/moderate variability/15x15 accelerations/no decelerations; Category I, reactive  [place]: irritability and some irregular contractions    Prenatal Labs:   Blood Type:   Lab Results   Component Value Date/Time    ABO Grouping A 03/13/2022 08:41 PM     , D (Rh type):   Lab Results   Component Value Date/Time    Rh Factor Positive 03/13/2022 08:41 PM     , HCT/HGB:   Lab Results   Component Value Date/Time    Hematocrit 37 6 03/13/2022 08:41 PM    Hematocrit 43 7 08/22/2015 08:07 AM    Hemoglobin 12 7 03/13/2022 08:41 PM    Hemoglobin 15 1 08/22/2015 08:07 AM      , Platelets:   Lab Results   Component Value Date/Time    Platelets 633 92/63/4863 08:41 PM    Platelets 896 35/62/2089 08:07 AM      , 1 hour Glucola:   Lab Results   Component Value Date/Time    Glucose 143 (H) 01/04/2022 08:26 AM   , 3 hour GTT:   Lab Results   Component Value Date/Time    Glucose, GTT - 3 Hour 151 (H) 01/13/2022 11:06 AM   , Rubella:   Lab Results   Component Value Date/Time    Rubella IgG Quant >175 0 09/02/2021 07:13 AM        , VDRL/RPR:   Lab Results   Component Value Date/Time    RPR Non-Reactive 09/02/2021 07:13 AM      , Urine Culture/Screen:   Lab Results   Component Value Date/Time    Urine Culture No Growth <1000 cfu/mL 09/02/2021 07:13 AM       , Hep B:   Lab Results   Component Value Date/Time    Hepatitis B Surface Ag Non-reactive 09/02/2021 07:13 AM     , HIV:   Lab Results   Component Value Date/Time    HIV-1/HIV-2 Ab Non-Reactive 09/02/2021 07:13 AM     , Chlamydia/Gonorrhea:   Lab Results   Component Value Date/Time    N gonorrhoeae, DNA Probe Negative 09/07/2021 09:45 AM     , Group B Strep:    Lab Results   Component Value Date/Time    Strep Grp B PCR Negative 02/24/2022 08:36 AM          Invasive Devices  Report    Peripheral Intravenous Line            Peripheral IV 03/13/22 Dorsal (posterior); Right Hand <1 day                    Tiffani Weaver MD  PGY-II, OBGYN  3/14/2022, 3:30 AM

## 2022-03-14 NOTE — OB LABOR/OXYTOCIN SAFETY PROGRESS
Oxytocin Safety Progress Check Note - Dalton Medina 28 y o  female MRN: [id number]    Unit/Bed#: L&D 325-01 Encounter: [id number]    Dose (kwadwo-units/min) Oxytocin: 4 kwadwo-units/min  Contraction Frequency (minutes): 1 5-3 5  Contraction Quality: Moderate  Tachysystole: No   Cervical Dilation: 6        Cervical Effacement: 80  Fetal Station: -2  Baseline Rate: 130 bpm  Fetal Heart Rate: 130 BPM  FHR Category: Category I     Vital Signs:   Vitals:    03/14/22 1216   BP: 136/81   Pulse: 83   Resp:    Temp:    SpO2:        Notes/comments:     Dalton Estrella is more comfortable s/p epidural bolus  Cervical exam is 6/80/-2  AROM for clear fluid  FHT is category I with pitocin at 4  Plan to continue with pitocin  D/W Dr Gregory Kennedy, [place] 3/14/2022 12:35 PM

## 2022-03-14 NOTE — OB LABOR/OXYTOCIN SAFETY PROGRESS
Oxytocin Safety Progress Check Note - Eliane Medina 28 y o  female MRN: [id number]    Unit/Bed#: L&D 325-01 Encounter: [id number]    Dose (kwadwo-units/min) Oxytocin: 2 kwadwo-units/min (per Dr Joel Arias)  Contraction Frequency (minutes): 2-4  Contraction Quality: Moderate  Tachysystole: No   Cervical Dilation: 2-3  Cervical Effacement: 50  Fetal Station: -3  Baseline Rate: 130 bpm  Fetal Heart Rate: 130 BPM  FHR Category: Category I    Vital Signs:   Vitals:    03/14/22 0600   BP: 118/70   Pulse: 70   Resp:    Temp:        Notes/comments:   Eliane Hess is comfortable after her epidural  Cervical exam unchanged  Guzman balloon placed blindly without incident and inflated with 60mL of sterile fluid  Patient tolerated procedure well  To be discussed with Dr Joe Patricia MD 3/14/2022 6:43 AM

## 2022-03-14 NOTE — OB LABOR/OXYTOCIN SAFETY PROGRESS
Oxytocin Safety Progress Check Note - Partmaureenia Posey 28 y o  female MRN: [id number]    Unit/Bed#: L&D 325-01 Encounter: [id number]    Dose (kwadwo-units/min) Oxytocin: 10 kwadwo-units/min  Contraction Frequency (minutes): 2 5-3  Contraction Quality: Moderate  Tachysystole: No   Cervical Dilation:  (deferred)  Cervical Effacement:  (deferred)  Fetal Station:  (deferred)  Baseline Rate: 135 bpm  Fetal Heart Rate: 130 BPM  FHR Category: Category I      Vital Signs:   Vitals:    03/14/22 1845   BP: 133/87   Pulse: 86   Resp:    Temp:    SpO2:        Notes/comments:   oKnrad Gibbons is not getting any relief from her epidural when she presses the button; she only gets relief when she gets boluses  Anesthesia has recommended that we change her pump and her nurses are working on this at this time  Due to her discomfort, cervical exam is deferred at this time  Fetal heart tracing remains category I  Discussed with Dr Chris Lester MD 3/14/2022 7:14 PM

## 2022-03-14 NOTE — ANESTHESIA PREPROCEDURE EVALUATION
Procedure:  LABOR ANALGESIA    Relevant Problems   ANESTHESIA (within normal limits)      CARDIO   (+) Chronic hypertension with superimposed preeclampsia      GYN   (+) 39 weeks gestation of pregnancy      NEURO/PSYCH   (+) Anxiety      PULMONARY (within normal limits)      Other   (+) Insulin controlled gestational diabetes mellitus (GDM) in third trimester        Physical Exam    Airway    Mallampati score: I  TM Distance: >3 FB  Neck ROM: full     Dental       Cardiovascular  Cardiovascular exam normal    Pulmonary  Pulmonary exam normal     Other Findings        Anesthesia Plan  ASA Score- 2     Anesthesia Type- epidural with ASA Monitors  Additional Monitors:   Airway Plan:           Plan Factors-    Chart reviewed  Existing labs reviewed  Induction-     Postoperative Plan-     Informed Consent- Anesthetic plan and risks discussed with patient

## 2022-03-14 NOTE — OB LABOR/OXYTOCIN SAFETY PROGRESS
Oxytocin Safety Progress Check Note - Corby Medina 28 y o  female MRN: [id number]    Unit/Bed#: L&D 325-01 Encounter: [id number]    Dose (kwadwo-units/min) Oxytocin: 4 kwadwo-units/min  Contraction Frequency (minutes): 1 5-2  Contraction Quality: Mild  Tachysystole: No   Cervical Dilation: 2-3  Cervical Effacement: 50  Fetal Station: -3  Baseline Rate: 135 bpm  FHR Category: Category I      Vital Signs:   Vitals:    03/14/22 0230   BP:    Pulse:    Resp:    Temp: 98 °F (36 7 °C)       Notes/comments: Tolerating contractions well  Pitocin has been started for induction  Fetal heart tracing is category 1  With contractions about every 2 minutes  Analgesia patient request   Discussed with Dr Andria Muhammad MD 3/14/2022 3:27 AM

## 2022-03-14 NOTE — OB LABOR/OXYTOCIN SAFETY PROGRESS
Oxytocin Safety Progress Check Note - Danilo Schneider 28 y o  female MRN: [id number]    Unit/Bed#: L&D 325-01 Encounter: [id number]    Dose (kwadwo-units/min) Oxytocin: 6 kwadwo-units/min  Contraction Frequency (minutes): 3-4 5  Contraction Quality: Moderate  Tachysystole: No   Cervical Dilation: 6        Cervical Effacement: 80  Fetal Station: -2  Baseline Rate: 115 bpm  Fetal Heart Rate: 130 BPM  FHR Category: Category I           Vital Signs:   Vitals:    03/14/22 1629   BP:    Pulse:    Resp:    Temp: 98 5 °F (36 9 °C)   SpO2:      Notes/comments:   Pt reports she had relief from epidural bolus, but that she has started feeling pain again now  SVE as above and unchanged from prior exam  FHT cat 1 scott q2-3 mins  Anesthesia to be contacted for pain relief at this time      Dr Nadege Kingsley aware    Ninfa Em MD 3/14/2022 4:49 PM

## 2022-03-15 LAB
ERYTHROCYTE [DISTWIDTH] IN BLOOD BY AUTOMATED COUNT: 15.5 % (ref 11.6–15.1)
GLUCOSE SERPL-MCNC: 92 MG/DL (ref 65–140)
HCT VFR BLD AUTO: 29.6 % (ref 34.8–46.1)
HGB BLD-MCNC: 9.9 G/DL (ref 11.5–15.4)
MCH RBC QN AUTO: 31.4 PG (ref 26.8–34.3)
MCHC RBC AUTO-ENTMCNC: 33.4 G/DL (ref 31.4–37.4)
MCV RBC AUTO: 94 FL (ref 82–98)
PLATELET # BLD AUTO: 166 THOUSANDS/UL (ref 149–390)
PMV BLD AUTO: 11 FL (ref 8.9–12.7)
RBC # BLD AUTO: 3.15 MILLION/UL (ref 3.81–5.12)
WBC # BLD AUTO: 15.27 THOUSAND/UL (ref 4.31–10.16)

## 2022-03-15 PROCEDURE — 85027 COMPLETE CBC AUTOMATED: CPT | Performed by: OBSTETRICS & GYNECOLOGY

## 2022-03-15 PROCEDURE — 99024 POSTOP FOLLOW-UP VISIT: CPT | Performed by: OBSTETRICS & GYNECOLOGY

## 2022-03-15 RX ORDER — DEXAMETHASONE SODIUM PHOSPHATE 4 MG/ML
8 INJECTION, SOLUTION INTRA-ARTICULAR; INTRALESIONAL; INTRAMUSCULAR; INTRAVENOUS; SOFT TISSUE ONCE AS NEEDED
Status: ACTIVE | OUTPATIENT
Start: 2022-03-15 | End: 2022-03-16

## 2022-03-15 RX ORDER — DIPHENHYDRAMINE HCL 25 MG
25 TABLET ORAL EVERY 6 HOURS PRN
Status: DISCONTINUED | OUTPATIENT
Start: 2022-03-15 | End: 2022-03-16 | Stop reason: HOSPADM

## 2022-03-15 RX ORDER — METOCLOPRAMIDE HYDROCHLORIDE 5 MG/ML
5 INJECTION INTRAMUSCULAR; INTRAVENOUS EVERY 6 HOURS PRN
Status: ACTIVE | OUTPATIENT
Start: 2022-03-15 | End: 2022-03-16

## 2022-03-15 RX ORDER — DOCUSATE SODIUM 100 MG/1
100 CAPSULE, LIQUID FILLED ORAL 2 TIMES DAILY
Status: DISCONTINUED | OUTPATIENT
Start: 2022-03-15 | End: 2022-03-16 | Stop reason: HOSPADM

## 2022-03-15 RX ORDER — KETOROLAC TROMETHAMINE 30 MG/ML
30 INJECTION, SOLUTION INTRAMUSCULAR; INTRAVENOUS EVERY 6 HOURS
Status: COMPLETED | OUTPATIENT
Start: 2022-03-15 | End: 2022-03-15

## 2022-03-15 RX ORDER — ACETAMINOPHEN 325 MG/1
650 TABLET ORAL EVERY 4 HOURS PRN
Status: DISCONTINUED | OUTPATIENT
Start: 2022-03-15 | End: 2022-03-16 | Stop reason: HOSPADM

## 2022-03-15 RX ORDER — ACETAMINOPHEN 325 MG/1
650 TABLET ORAL EVERY 6 HOURS PRN
Status: DISCONTINUED | OUTPATIENT
Start: 2022-03-15 | End: 2022-03-16 | Stop reason: HOSPADM

## 2022-03-15 RX ORDER — NALOXONE HYDROCHLORIDE 0.4 MG/ML
0.1 INJECTION, SOLUTION INTRAMUSCULAR; INTRAVENOUS; SUBCUTANEOUS
Status: ACTIVE | OUTPATIENT
Start: 2022-03-15 | End: 2022-03-16

## 2022-03-15 RX ORDER — DIPHENHYDRAMINE HYDROCHLORIDE 50 MG/ML
25 INJECTION INTRAMUSCULAR; INTRAVENOUS EVERY 6 HOURS PRN
Status: ACTIVE | OUTPATIENT
Start: 2022-03-15 | End: 2022-03-16

## 2022-03-15 RX ORDER — OXYCODONE HYDROCHLORIDE 5 MG/1
10 TABLET ORAL EVERY 4 HOURS PRN
Status: DISCONTINUED | OUTPATIENT
Start: 2022-03-15 | End: 2022-03-16 | Stop reason: HOSPADM

## 2022-03-15 RX ORDER — ONDANSETRON 2 MG/ML
4 INJECTION INTRAMUSCULAR; INTRAVENOUS EVERY 4 HOURS PRN
Status: ACTIVE | OUTPATIENT
Start: 2022-03-15 | End: 2022-03-16

## 2022-03-15 RX ORDER — ONDANSETRON 2 MG/ML
4 INJECTION INTRAMUSCULAR; INTRAVENOUS EVERY 8 HOURS PRN
Status: DISCONTINUED | OUTPATIENT
Start: 2022-03-15 | End: 2022-03-16 | Stop reason: HOSPADM

## 2022-03-15 RX ORDER — OXYCODONE HYDROCHLORIDE AND ACETAMINOPHEN 5; 325 MG/1; MG/1
1 TABLET ORAL EVERY 6 HOURS PRN
Status: ACTIVE | OUTPATIENT
Start: 2022-03-15 | End: 2022-03-16

## 2022-03-15 RX ORDER — OXYTOCIN/RINGER'S LACTATE 30/500 ML
62.5 PLASTIC BAG, INJECTION (ML) INTRAVENOUS CONTINUOUS
Status: DISPENSED | OUTPATIENT
Start: 2022-03-15 | End: 2022-03-15

## 2022-03-15 RX ORDER — OXYCODONE HYDROCHLORIDE 5 MG/1
5 TABLET ORAL EVERY 4 HOURS PRN
Status: DISCONTINUED | OUTPATIENT
Start: 2022-03-15 | End: 2022-03-16 | Stop reason: HOSPADM

## 2022-03-15 RX ADMIN — DOCUSATE SODIUM 100 MG: 100 CAPSULE ORAL at 18:07

## 2022-03-15 RX ADMIN — KETOROLAC TROMETHAMINE 30 MG: 30 INJECTION, SOLUTION INTRAMUSCULAR at 00:28

## 2022-03-15 RX ADMIN — DOCUSATE SODIUM 100 MG: 100 CAPSULE ORAL at 09:54

## 2022-03-15 RX ADMIN — KETOROLAC TROMETHAMINE 30 MG: 30 INJECTION, SOLUTION INTRAMUSCULAR at 18:07

## 2022-03-15 RX ADMIN — ACETAMINOPHEN 650 MG: 325 TABLET ORAL at 23:03

## 2022-03-15 RX ADMIN — Medication 62.5 MILLI-UNITS/MIN: at 01:27

## 2022-03-15 RX ADMIN — KETOROLAC TROMETHAMINE 30 MG: 30 INJECTION, SOLUTION INTRAMUSCULAR at 12:06

## 2022-03-15 RX ADMIN — SODIUM CHLORIDE 125 ML/HR: 9 INJECTION, SOLUTION INTRAVENOUS at 06:20

## 2022-03-15 RX ADMIN — KETOROLAC TROMETHAMINE 30 MG: 30 INJECTION, SOLUTION INTRAMUSCULAR at 06:19

## 2022-03-15 NOTE — PLAN OF CARE
Problem: PAIN - ADULT  Goal: Verbalizes/displays adequate comfort level or baseline comfort level  Description: Interventions:  - Encourage patient to monitor pain and request assistance  - Assess pain using appropriate pain scale  - Administer analgesics based on type and severity of pain and evaluate response  - Implement non-pharmacological measures as appropriate and evaluate response  - Consider cultural and social influences on pain and pain management  - Notify physician/advanced practitioner if interventions unsuccessful or patient reports new pain  Outcome: Progressing     Problem: INFECTION - ADULT  Goal: Absence or prevention of progression during hospitalization  Description: INTERVENTIONS:  - Assess and monitor for signs and symptoms of infection  - Monitor lab/diagnostic results  - Monitor all insertion sites, i e  indwelling lines, tubes, and drains  - Monitor endotracheal if appropriate and nasal secretions for changes in amount and color  - [place] appropriate cooling/warming therapies per order  - Administer medications as ordered  - Instruct and encourage patient and family to use good hand hygiene technique  - Identify and instruct in appropriate isolation precautions for identified infection/condition  Outcome: Progressing  Goal: Absence of fever/infection during neutropenic period  Description: INTERVENTIONS:  - Monitor WBC    Outcome: Progressing     Problem: SAFETY ADULT  Goal: Patient will remain free of falls  Description: INTERVENTIONS:  - Educate patient/family on patient safety including physical limitations  - Instruct patient to call for assistance with activity   - Consult OT/PT to assist with strengthening/mobility   - Keep Call bell within reach  - Keep bed low and locked with side rails adjusted as appropriate  - Keep care items and personal belongings within reach  - Initiate and maintain comfort rounds  - Make Fall Risk Sign visible to staff  - Offer Toileting every  Hours, in advance of need  - Initiate/Maintain alarm  - Obtain necessary fall risk management equipment:   - Apply yellow socks and bracelet for high fall risk patients  - Consider moving patient to room near nurses station  Outcome: Progressing  Goal: Maintain or return to baseline ADL function  Description: INTERVENTIONS:  -  Assess patient's ability to carry out ADLs; assess patient's baseline for ADL function and identify physical deficits which impact ability to perform ADLs (bathing, care of mouth/teeth, toileting, grooming, dressing, etc )  - Assess/evaluate cause of self-care deficits   - Assess range of motion  - Assess patient's mobility; develop plan if impaired  - Assess patient's need for assistive devices and provide as appropriate  - Encourage maximum independence but intervene and supervise when necessary  - Involve family in performance of ADLs  - Assess for home care needs following discharge   - Consider OT consult to assist with ADL evaluation and planning for discharge  - Provide patient education as appropriate  Outcome: Progressing  Goal: Maintains/Returns to pre admission functional level  Description: INTERVENTIONS:  - Perform BMAT or MOVE assessment daily    - Set and communicate daily mobility goal to care team and patient/family/caregiver  - Collaborate with rehabilitation services on mobility goals if consulted  - Perform Range of Motion  times a day  - Reposition patient every  hours    - Dangle patient  times a day  - Stand patient  times a day  - Ambulate patient  times a day  - Out of bed to chair  times a day   - Out of bed for meals times a day  - Out of bed for toileting  - Record patient progress and toleration of activity level   Outcome: Progressing     Problem: Knowledge Deficit  Goal: Patient/family/caregiver demonstrates understanding of disease process, treatment plan, medications, and discharge instructions  Description: Complete learning assessment and assess knowledge base   Interventions:  - Provide teaching at level of understanding  - Provide teaching via preferred learning methods  Outcome: Completed  Goal: Verbalizes understanding of labor plan  Description: Assess patient/family/caregiver's baseline knowledge level and ability to understand information  Provide education via patient/family/caregiver's preferred learning method at appropriate level of understanding  1  Provide teaching at level of understanding  2  Provide teaching via preferred learning method(s)  Outcome: Completed     Problem: DISCHARGE PLANNING  Goal: Discharge to home or other facility with appropriate resources  Description: INTERVENTIONS:  - Identify barriers to discharge w/patient and caregiver  - Arrange for needed discharge resources and transportation as appropriate  - Identify discharge learning needs (meds, wound care, etc )  - Arrange for interpretive services to assist at discharge as needed  - Refer to Case Management Department for coordinating discharge planning if the patient needs post-hospital services based on physician/advanced practitioner order or complex needs related to functional status, cognitive ability, or social support system  Outcome: Progressing     Problem: Labor & Delivery  Goal: Manages discomfort  Description: Assess and monitor for signs and symptoms of discomfort  Assess patient's pain level regularly and per hospital policy  Administer medications as ordered  Support use of nonpharmacological methods to help control pain such as distraction, imagery, relaxation, and application of heat and cold  Collaborate with interdisciplinary team and patient to determine appropriate pain management plan  1  Include patient in decisions related to comfort  2  Offer non-pharmacological pain management interventions  3  Report ineffective pain management to physician  Outcome: Completed  Goal: Patient vital signs are stable  Description: 1   Assess vital signs - vaginal delivery    Outcome: Completed     Problem: BIRTH - VAGINAL/ SECTION  Goal: Fetal and maternal status remain reassuring during the birth process  Description: INTERVENTIONS:  - Monitor vital signs  - Monitor fetal heart rate  - Monitor uterine activity  - Monitor labor progression (vaginal delivery)  - DVT prophylaxis  - Antibiotic prophylaxis  Outcome: Progressing  Goal: Emotionally satisfying birthing experience for mother/fetus  Description: Interventions:  - Assess, plan, implement and evaluate the nursing care given to the patient in labor  - Advocate the philosophy that each childbirth experience is a unique experience and support the family's chosen level of involvement and control during the labor process   - Actively participate in both the patient's and family's teaching of the birth process  - Consider cultural, Confucianist and age-specific factors and plan care for the patient in labor  Outcome: Progressing     Problem: Potential for Falls  Goal: Patient will remain free of falls  Description: INTERVENTIONS:  - Educate patient/family on patient safety including physical limitations  - Instruct patient to call for assistance with activity   - Consult OT/PT to assist with strengthening/mobility   - Keep Call bell within reach  - Keep bed low and locked with side rails adjusted as appropriate  - Keep care items and personal belongings within reach  - Initiate and maintain comfort rounds  - Make Fall Risk Sign visible to staff  - Offer Toileting every  Hours, in advance of need  - Initiate/Maintain alarm  - Obtain necessary fall risk management equipment:   - Apply yellow socks and bracelet for high fall risk patients  - Consider moving patient to room near nurses station  Outcome: Progressing

## 2022-03-15 NOTE — ANESTHESIA POSTPROCEDURE EVALUATION
Post-Op Assessment Note    CV Status:  Stable  Pain Score: 0    Pain management: adequate     Mental Status:  Alert and awake   Hydration Status:  Euvolemic   PONV Controlled:  Controlled   Airway Patency:  Patent      Post Op Vitals Reviewed: Yes      Staff: Anesthesiologist, CRNA     Post-op block assessment: catheter intact and no complications      No complications documented      /76 (03/15/22 0015)    Temp      Pulse 88 (03/15/22 0015)   Resp 16 (03/15/22 0015)    SpO2 98 % (03/15/22 0015)

## 2022-03-15 NOTE — UTILIZATION REVIEW
Initial Clinical Review    Admission: Date/Time/Statement:   Admission Orders (From admission, onward)     Ordered        22  Inpatient Admission  Once                      Orders Placed This Encounter   Procedures    Inpatient Admission     Standing Status:   Standing     Number of Occurrences:   1     Order Specific Question:   Level of Care     Answer:   Med Surg [16]     Order Specific Question:   Estimated length of stay     Answer:   More than 2 Midnights     Order Specific Question:   Certification     Answer:   I certify that inpatient services are medically necessary for this patient for a duration of greater than two midnights  See H&P and MD Progress Notes for additional information about the patient's course of treatment  ED Arrival Information     Patient not seen in ED                     Chief Complaint   Patient presents with    Scheduled Induction       Initial Presentation:  28 y o   who is being admit Inpatient for elective  induction of labor in the setting fo chronic HTN w superimposed pre eclampsia meeting criteria on adm due to [facility] of 0 31, asymptomatic  Vertex via US, plan for cervical ripening via VA , velásquez, IV PIT, epidural  AROM, viable baby Boy delivered via Primary C secton for arrest of dilation 3/14 @ 2312, apgars 9&9, BW 9LB10 2OZ   Date: 3/14/2022   Day 2:   3/14 3:27 AM  Oxytocin: 4 kwadwo-units/min Cervical Dilation: 2-3 Cervical Effacement: 50  3/14/2022 5:01 AM epidural   3/14  6:43 AM  Velásquez balloon placed blindly without incident, Oxytocin: 2 kwadwo-units/min (per Dr Narda Pate)   Cervical Dilation: 2-3  3/14/2022  12:35 PM  Cervical exam is 6/80/-2  AROM   3/14/2022 3:12 PM Anesthesia team to re-bolus at this time for improved pain control    3/14/2022 8:46 PM  Given lack of cervical change of 6 hours in the presence of inadequate or unknown contraction strength, Patient meets criteria for Arrest of Dilation, Pitocin was stopped  Pt got bolus for epidural and iv hydration       Procedure(s) (LRB):  SECTION () (N/A)   Anesthesia Type:   Epidural   Operative Findings:  Viable male  delivered at 2312 with APGARS of 9 and 9 at 1 and 5 minutes respectively  Birth weight 9lbs 10 2oz   Triage Vitals   Temperature Pulse Respirations Blood Pressure SpO2   22 0700   97 9 °F (36 6 °C) 83 18 133/81 99 %      Temp Source Heart Rate Source Patient Position - Orthostatic VS BP Location FiO2 (%)   22 --   Oral Monitor Lying Right arm       Pain Score       22       No Pain          Wt Readings from Last 1 Encounters:   22 83 9 kg (185 lb)     Additional Vital Signs:   03/15/22 0700 98 3 °F (36 8 °C) 81 18 114/63 97 % -- -- --   03/15/22 0435 97 9 °F (36 6 °C) 66 16 101/66 97 % -- -- --   03/15/22 0335 97 8 °F (36 6 °C) 72 16 126/72 97 % -- -- --   03/15/22 0235 97 6 °F (36 4 °C) 75 16 121/69 95 % None (Room air) -- Lying   03/15/22 0135 98 °F (36 7 °C) 76 16 124/68 98 % -- -- --   03/15/22 0035 -- 83 16 131/79 99 % -- -- Lying   03/15/22 0025 -- 84 16 140/71 99 % -- -- Lying   03/15/22 0015 -- 88 16 133/76 98 % -- -- Lying   03/15/22 0005 98 1 °F (36 7 °C) 98 16 139/77 98 % -- -- Lying   22 2355 -- 96 16 114/72 97 % -- -- Lying   22 -- 86 -- 142/81 -- -- --        22 -- 90 -- -- -- -- -- --   22 -- 93 -- 132/88 -- -- -- --   22 -- 83 -- 133/81 -- -- -- --   22 97 9 °F (36 6 °C) -- 18 -- -- -- -- Lying    Comments:   BP: Dr Talita Gamble notified  No new orders at this time   at 22 1646      Weights (last 14 days)    Date/Time Weight Height   22 83 9 kg (185 lb) 5' 4" (1 626 m)       Pertinent Labs/Diagnostic Test Results:         Results from last 7 days   Lab Units 03/15/22  0522 22   WBC Thousand/uL 15 27* 9 34   HEMOGLOBIN g/dL 9 9* 12 7 HEMATOCRIT % 29 6* 37 6   PLATELETS Thousands/uL 166 217         Results from last 7 days   Lab Units 03/13/22  2041   SODIUM mmol/L 137   POTASSIUM mmol/L 3 4*   CHLORIDE mmol/L 107   CO2 mmol/L 20*   ANION GAP mmol/L 10   BUN mg/dL 11   CREATININE mg/dL 0 80   EGFR ml/min/1 73sq m 99   CALCIUM mg/dL 8 8     Results from last 7 days   Lab Units 03/13/22  2041   AST U/L 23   ALT U/L 14   ALK PHOS U/L 229*   TOTAL PROTEIN g/dL 6 6   ALBUMIN g/dL 2 5*   TOTAL BILIRUBIN mg/dL 0 32     Results from last 7 days   Lab Units 03/14/22  2104 03/14/22  1927 03/14/22  1715 03/14/22  1501 03/14/22  1324 03/14/22  1101 03/14/22  0901 03/14/22  0706 03/14/22  0507 03/14/22  0403 03/14/22  0306 03/14/22  0149   POC GLUCOSE mg/dl 92 86 68 66 81 64* 79 74 91 94 103 98     Results from last 7 days   Lab Units 03/13/22  2041   GLUCOSE RANDOM mg/dL 108               Results from last 7 days   Lab Units 03/13/22  2041   CREATININE UR mg/dL 65 4   PROTEIN UR mg/dL 20   PROT/CREAT RATIO UR  0 31*         ED Treatment:   Medication Administration - No Administrations Displayed (No Start Event Found)     None        Past Medical History:   Diagnosis Date    Abnormal Pap smear of cervix     Chronic hypertension affecting pregnancy     Female infertility     Gestational diabetes     HPV in female     Kidney stone     Varicella      Present on Admission:   Chronic hypertension with superimposed preeclampsia   Insulin controlled gestational diabetes mellitus (GDM) in third trimester      Admitting Diagnosis: Encounter for induction of labor [Z34 90]  Age/Sex: 28 y o  female  Admission Orders:  Continuous  external uterine contraction & fetal HR monitoring  Scheduled Medications:  docusate sodium, 100 mg, Oral, BID  Ketamine HCl, , ,   ketorolac, 30 mg, Intravenous, Q6H  morphine, , ,   VA  misoprostol (CYTOTEC) split tablet 25 mcg 3/13 2122  Dose: 25 mcg  Freq: Every 4 hours Route: VA    Continuous IV Infusions:  oxytocin, 62 5 kwadwo-units/min, Intravenous, Continuous  sodium chloride, 125 mL/hr, Intravenous, Continuous      PRN Meds:  acetaminophen, 650 mg, Oral, Q4H PRN  acetaminophen, 650 mg, Oral, Q6H PRN  dexamethasone, 8 mg, Intravenous, Once PRN  diphenhydrAMINE, 25 mg, Intravenous, Q6H PRN  diphenhydrAMINE, 25 mg, Oral, Q6H PRN  metoclopramide, 5 mg, Intravenous, Q6H PRN  naloxone, 0 1 mg, Intravenous, Q3 min PRN  ondansetron, 4 mg, Intravenous, Q4H PRN  ondansetron, 4 mg, Intravenous, Q8H PRN  oxyCODONE, 10 mg, Oral, Q4H PRN  oxyCODONE, 5 mg, Oral, Q4H PRN  oxyCODONE-acetaminophen, 1 tablet, Oral, Q6H PRN  Network Utilization Review Department  ATTENTION: Please call with any questions or concerns to [phone] and carefully listen to the prompts so that you are directed to the right person  All voicemails are confidential   Nitish Monk all requests for admission clinical reviews, approved or denied determinations and any other requests to dedicated fax number below belonging to the campus where the patient is receiving treatment   List of dedicated fax numbers for the Facilities:  [address] DENIALS (Administrative/Medical Necessity) [phone]   [address] (Maternity/NICU/Pediatrics) [phone]   [address]  [address] [facility] [place] [address] [address] [place] [address] [address] [facility] [phone]

## 2022-03-15 NOTE — PROGRESS NOTES
SAFETY HUDDLE:  TRIGGER: Patient meets criteria for Arrest of Dilation    PARTICIPANTS: Myself,  and the rn   Anesthesia was present and aware of the plan     REVIEW OF CURRENT PLAN OF CARE AND MANAGEMENT:   Jose Angel Lombardi is in the not in active phase of labor  The cervical exam is 6 and has remained unchanged for the last 8 +  hours  During this time, the contraction strength has been inadequate with [place] units of approximately 5 per ten minutes, however contractions have been occurring 3regularly every 3 minutes  Given lack of cervical change of 6 hours in the presence of inadequate or unknown contraction strength, the patient meets criteria for Arrest of Dilation  Maternal status is   Fetal status shows a Category 1 FHR tracing

## 2022-03-15 NOTE — LACTATION NOTE
This note was copied from a baby's chart  Follow up lactation: mom just finished pumping  Edilson Fiddler over to the right breast in cross cradle  3-5 suck burst achieved  Moved [place] to the left breast in laid back  No active sucking  Allowing Mike Sandifer to "hang" on the breast for a few minutes  FOB fed Gambier 5 mls of expressed colostrum via syringe  Demonstration with teach back of hand expression after latch was reviewed  Mom expression 0 5 mls in a cup, Used syringe to feed to baby  Demonstrated washing of pump parts  Enc  To pump every 2 hours during the day and every 3 hours at night  Keep Gambier s2s and allow non-nutritive suck after the feeding  Provided education on alignment of nose to breast; bring baby to breast and not breast to baby; support head with opp  Hand in cross cradle; use pillows to lift baby to be belly to belly; ear, shoulder, hip alignment; Support mother's back and place self in comfortable position to support bringing baby to the breast  Shoulders should be down and away from ears  Education on alternative feeding methods  Demonstration and teach back of (, syringe, )  Baby took 5 5of expressed colostrum /   Encouraged to call lactation for additional assistance with feedings

## 2022-03-15 NOTE — PROGRESS NOTES
Progress Note - OB/GYN   Wally Grimes 28 y o  female MRN: [id number]  Unit/Bed#: L&D 309-01 Encounter: [id number]    Assessment:  Post partum Day #1 s/p1 LTCS for arrest of dilation, stable, baby in room    Plan:  1) Post-operative state   QBL: 644 cc, Hgb: 12 7--> 9 9   No current symptoms, VSS   Velásquez in place, to be removed 12h after delivery  2) cHTN w/ SI Pre-e w/o SF, with no meds   Bps: 100s-140s/60s-70s since delivery   No current symptoms, VSS  3) A2GDM   2h GTT at 6wk pp    4) Continue routine post partum care   Encourage ambulation   Encourage breastfeeding   Anticipate discharge POD 2 vs POD3     Subjective/Objective   Chief Complaint:     Post delivery  Patient is doing well  Lochia WNL  Pain well controlled  Subjective:     Pain: yes, cramping, improved with meds  Tolerating PO: yes  Voiding: velásquez in place  Flatus: yes  BM: no  Ambulating: no  Chest pain: no  Shortness of breath: no  Leg pain: no  Lochia: minimal    Objective:     Vitals: /66 (BP Location: Left arm)   Pulse 66   Temp 97 9 °F (36 6 °C) (Oral)   Resp 16   Ht 5' 4" (1 626 m)   Wt 83 9 kg (185 lb)   SpO2 97%   Breastfeeding Unknown   BMI 31 76 kg/m²       Intake/Output Summary (Last 24 hours) at 3/15/2022 0656  Last data filed at 3/15/2022 0451  Gross per 24 hour   Intake 1000 ml   Output 4269 ml   Net -3269 ml       Lab Results   Component Value Date    WBC 15 27 (H) 03/15/2022    HGB 9 9 (L) 03/15/2022    HCT 29 6 (L) 03/15/2022    MCV 94 03/15/2022     03/15/2022       Physical Exam:     Gen: AAOx3, NAD  CV: RRR  Lungs: CTA b/l  Abd: Soft, non-tender, non-distended, no rebound or guarding  Uterine fundus firm and non-tender, 1 cm below the umbilicus   Incision c/d/i  Ext: Non tender    Jose Alejandro Epperson MD  3/15/2022  6:56 AM

## 2022-03-15 NOTE — DISCHARGE INSTRUCTIONS
Section   WHAT YOU SHOULD KNOW:   A  delivery, or , is abdominal surgery to deliver your baby  There are many reasons you may need a   · A  may be scheduled before labor if you had a  with your last baby  It may be scheduled if your baby is not positioned normally, or you are pregnant with more than 1 baby  · Your caregiver may perform an emergency  during labor to prevent life-threatening complications for you or your baby  A  may be done if your cervix does not dilate after several hours of active labor  · Other reasons for a  include maternal infections and problems with the placenta  AFTER YOU LEAVE:   Medicines:   · Prescription pain medicine  may be given  Ask how to take this medicine safely  · Acetaminophen  decreases pain and fever  It is available without a doctor's order  Ask how much to take and how often to take it  Follow directions  Acetaminophen can cause liver damage if not taken correctly  · NSAIDs  help decrease swelling and pain or fever  This medicine is available with or without a doctor's order  NSAIDs can cause stomach bleeding or kidney problems in certain people  If you take blood thinner medicine, always ask your obstetrician if NSAIDs are safe for you  Always read the medicine label and follow directions  · Take your medicine as directed  Contact your obstetrician (OB) if you think your medicine is not helping or if you have side effects  Tell him if you are allergic to any medicine  Keep a list of the medicines, vitamins, and herbs you take  Include the amounts, and when and why you take them  Bring the list or the pill bottles to follow-up visits  Carry your medicine list with you in case of an emergency  Follow up with your OB as directed: You may need to return to have your stitches or staples removed  Write down your questions so you remember to ask them during your visits    Wound care:  Carefully wash your wound with soap and water every day  Keep your wound clean and dry  Wear loose, comfortable clothes that do not rub against your wound  Ask your OB about bathing and showering  Drink plenty of liquids: You can lower your risk for a blood clot if you drink plenty of liquids  Ask how much liquid to drink each day and which liquids are best for you  Limit activity until you have fully recovered from surgery:   · Ask when it is safe for you to drive, walk up stairs, lift heavy objects, and have sex  · Ask when it is okay to exercise, and what types of exercise to do  Start slowly and do more as you get stronger  Contact your OB if:   · You have heavy vaginal bleeding that fills 1 or more sanitary pads in 1 hour  · You have a fever  · Your incision is swollen, red, or draining pus  · You have questions or concerns about yourself or your baby  Seek care immediately or call 911 if:   · Blood soaks through your bandage  · Your stitches come apart  · You feel lightheaded, short of breath, and have chest pain  · You cough up blood  · Your arm or leg feels warm, tender, and painful  It may look swollen and red  © 2014 [address] is for End User's use only and may not be sold, redistributed or otherwise used for commercial purposes  All illustrations and images included in CareNotes® are the copyrighted property of A D A M , Inc  or Bryson Faulkner  The above information is an  only  It is not intended as medical advice for individual conditions or treatments  Talk to your doctor, nurse or pharmacist before following any medical regimen to see if it is safe and effective for you

## 2022-03-15 NOTE — LACTATION NOTE
This note was copied from a baby's chart  Follow up Lactation: Followed up with mom  She states baby latched at 5 pm on both breasts an provided some sucks  Encouragement and reassurance provided  Warmed up expressed milk and brought into room for parents to syringe feed  7 5 mls of expressed colostrum  Encouraged mom to pump after the feeding  feeding plan  1  Meet early feeding cues  2  Use massage, warmth, hand expression to stimulate breasts  3  Bring baby to breast skin to skin  4  Align nipple to nose, drag nipple to chin, (move baby not breast) and bring baby to breast when mouth is wide and deep latch is achieved  Use laid back and side lying to support recessed chin into breast tissue  5  Use breast compressions to stimulate suck  6  Once baby does not suck with stimulation, becomes fussy, or un-latches feed expressed milk via alternative feeding method (syringe)  7  Bring baby back to breast for non-nutritive suck and skin to skin  8   Pump after each feed to stimulate breasts and have expressed milk for next feed    Make appt at baby and me prior to discharge

## 2022-03-15 NOTE — UTILIZATION REVIEW
Inpatient Admission Authorization Request   Notification of Maternity/Delivery &  Birth Information for Admission   SERVICING FACILITY:   [address] [place] [address], [address] [facility]  Tax ID: [id number]  NPI: [id number]  Place of Service: Inpatient [address] [facility]  [address]  Place of Service Code: 24     ATTENDING PROVIDER:  Attending Name and NPI#: Gaby Camarena Md [[id number]]  Address: [address] [place] [address], [address] [facility]  Phone: [phone]     UTILIZATION REVIEW CONTACT:  Madiha Geiger, Utilization   Network Utilization Review Department  Phone: [phone]  Fax [phone]  Email: [email or web address]     PHYSICIAN ADVISORY SERVICES:  FOR [id number] REVIEW - MEDICAL NECESSITY DENIAL  Phone: [phone]  Fax: [phone]  Email: [email or web address]     TYPE OF REQUEST:  Inpatient Status     ADMISSION INFORMATION:  ADMISSION DATE/TIME: 3/13/22  8:08 PM  PATIENT DIAGNOSIS CODE/DESCRIPTION:  Encounter for induction of labor [Z34 90] There were no encounter diagnoses  No diagnosis found  DISCHARGE DATE/TIME: No discharge date for patient encounter  MOTHER AND  INFORMATION:  Mother: Wilfredo Vaughan 1990   Delivering clinician: [facility]   OB History        1    Para   1    Term   1            AB        Living   1       SAB        IAB        Ectopic        Multiple   0    Live Births   1               Montgomery Name & MRN:   Information for the patient's :  Lisbeth Gardner [[id number]]     Montgomery Delivery Information:  Sex: male  Delivered 3/14/2022 11:12 PM by , Low Transverse; Gestational Age: 44w2d    Montgomery Measurements:  Weight: 9 lb 10 2 oz (4370 g);   Height: 22"    APGAR 1 minute 5 minutes 10 minutes   Totals: 9 9       Birth Information: 28 y o  female MRN: [id number] Unit/Bed#: L&D 309-01 Estimated Date of Delivery: 3/19/22  Birthweight: No birth weight on file  Gestational Age: <None> Delivery Type: , Low Transverse      IMPORTANT INFORMATION:  Please contact the Pernell Kayser directly with any questions or concerns regarding this request  Department voicemails are confidential     Send requests for admission clinical reviews, concurrent reviews, approvals, and administrative denials due to lack of clinical to fax [phone]

## 2022-03-15 NOTE — LACTATION NOTE
This note was copied from a baby's chart  CONSULT - LACTATION  Baby Boy (Jeffery Cushing) Carol 1 days male MRN: [id number]    [address] [facility] NURSERY Room / Bed: L&D 309(N)/L&D 309(N) Encounter: [id number]    Maternal Information     MOTHER:  Al Del Toro  Maternal Age: 28 y o    OB History: # 1 - Date: 22, Sex: Male, Weight: 4370 g (9 lb 10 2 oz), GA: 39w2d, Delivery: , Low Transverse, Apgar1: 9, Apgar5: 9, Living: Living, Birth Comments: Dr Chester Mchugh present at delivery   Previouse breast reduction surgery? No    Lactation history:   Has patient previously breast fed: No   How long had patient previously breast fed:     Previous breast feeding complications:       Past Surgical History:   Procedure Laterality Date    COLPOSCOPY      EXPLORATORY LAPAROTOMY      HYSTEROSCOPY W/ POLYPECTOMY      KIDNEY STONE SURGERY      TONSILLECTOMY      WISDOM TOOTH EXTRACTION          Birth information:  YOB: 2021   Time of birth: 11:12 PM   Sex: male   Delivery type: , Low Transverse   Birth Weight: 4370 g (9 lb 10 2 oz)   Percent of Weight Change: 0%     Gestational Age: 44w2d   [unfilled]    Assessment     Breast and nipple assessment: symmetrical, round, med/large round breasts with dark areolas and flat nipples  With nipple compression, bilateral invert  Right appears everted more than left   Assessment: receded chin and Tight, lower labial frenulum    Feeding assessment: latch difficulty (due to positioning and flat nipples)  LATCH:  Latch: Repeated attempts, hold nipple in mouth, stimulate to suck   Audible Swallowing: None   Type of Nipple: Flat   Comfort (Breast/Nipple): Soft/non-tender   Hold (Positioning): Partial assist, teach one side, mother does other, staff holds   [facility] 5          Feeding recommendations:  breast feed on demand  Education on Hand expression, early feeding cues, s2s and feeding log   Mom has pump at bedside that was established at 630 am attempt  Mom expressed colostrum and has 2 5 mls at bedside  Education on timing that colostrum can sit out  HE+++  Wandareroya Dorinda to the breast in laid back on both breasts  Attempts to latch on left breast with a few coordinated sucks  After three sucks, loses latch  Moved Naga to right breast, longer coordinated sucking  After approx  10 min  Of latch attempts, created feeding plan to pump and alternate feeding  Set up pumping  Mom needs smaller flange  Used 22 mm flanges, better pumping and fit  Demonstrated syringe feeding  Mom fed 10 5 mls of expressed colostrum to Naga via syringe  A latch assist was demonstrated  Supple cups discussed  Use of manual pump was demonstrated  Mom has an S1 pump    RSB/DC reviewed high level  Enc  To call lactation    Will need a baby and me appt prior to d/c    Discussed with MOB how to utilize feeding log & monitor baby's output  Education on signs of satiation during a feeding, size of baby's belly, and offering both breasts at every feeding session provided  Instructions given on pumping  Discussed when to start, frequency, different pumps available versus manual expression  Discussed hygiene of hands and supplies as well as assembly, placement of flanges, size of flanged, preparing the breast and cycles and suction settings on pump  Demonstrated use of hand pump  Discussed labeling of milk, storage, and preparation of stored milk  Education on alternative feeding methods  Demonstration and teach back of (, syringe, )  Baby took 10 5 ml of expressed colostrum  Encouraged to call lactation for additional assistance with feedings  Information on hand expression given  Discussed benefits of knowing how to manually express breast including stimulating milk supply, softening nipple for latch and evacuating breast in the event of engorgement  Mom is encouraged to place baby skin to skin for feedings   Skin to skin education provided for baby placement on mother's chest, baby only in diaper, blankets below shoulders on baby's back  Skin to skin is encouraged to continue at home for feedings and between feedings  Worked on positioning infant up at chest level and starting to feed infant with nose arriving at the nipple  Then, using areolar compression to achieve a deep latch that is comfortable and exchanges optimum amounts of milk  - Start feedings on breast that last feeding ended   - allow no more than 3 hours between breast feeding sessions   - time between feedings is counted from the beginning of the first feed to the beginning of the next feeding session    Reviewed early signs of hunger, including tensing of hands and shoulders - no need to wait for open eyes  Crying is a late hunger sign  If baby is crying, soothe baby first and then attempt to latch  Reviewed normal sucking patterns: transition from stimulation to nutritive to release or non-nutritive  The goal is to see and hear lots of swallowing  Reviewed normal nursing pattern: infant could latch on one breast up to 30 minutes or until releases on own  Signs of satiation is open hand with fingers that do not grab your finger  Discussed difference in sensation of non-nutritive v nutritive sucking    Met with mother  Provided mother with Ready, Set, Baby booklet  Discussed Skin to Skin contact an benefits to mom and baby  Talked about the delay of the first bath until baby has adjusted  Spoke about the benefits of rooming in  Feeding on cue and what that means for recognizing infant's hunger  Avoidance of pacifiers for the first month discussed  Talked about exclusive breastfeeding for the first 6 months  Positioning and latch reviewed as well as showing images of other feeding positions  Discussed the properties of a good latch in any position  Reviewed hand/manual expression    Discussed s/s that baby is getting enough milk and some s/s that breastfeeding dyad may need further help  Gave information on common concerns, what to expect the first few weeks after delivery, preparing for other caregivers, and how partners can help  Resources for support also provided  Encouraged parents to call for assistance, questions, and concerns about breastfeeding  Extension provided  Met with mother to go over discharge breastfeeding booklet including the feeding log  Emphasized 8 or more (12) feedings in a 24 hour period, what to expect for the number of diapers per day of life and the progression of properties of the  stooling pattern  Reviewed breastfeeding and your lifestyle, storage and preparation of breast milk, how to keep you breast pump clean, the employed breastfeeding mother and paced bottle feeding handouts  Booklet included Breastfeeding Resources for after discharge including access to the number for the [address]  Provided education on growth spurts, when to introduce bottles; paced bottle feeding, and non-nutritive suck at the breast  Provided education on Signs of satiation  Encouraged to call lactation to observe a latch prior to discharge for reassurance  Encouraged to call baby and me with any questions and closely monitor output    Tino, [address] [place] 3/15/2022 9:34 AM

## 2022-03-15 NOTE — OB LABOR/OXYTOCIN SAFETY PROGRESS
Oxytocin Safety Progress Check Note - Fabby Medina 28 y o  female MRN: [id number]    Unit/Bed#: L&D 325-01 Encounter: [id number]    Dose (kwadwo-units/min) Oxytocin: 10 kwadwo-units/min  Contraction Frequency (minutes): 2 5-3  Contraction Quality: Moderate  Tachysystole: No   Cervical Dilation: 6  Cervical Effacement: 80  Fetal Station: -2  Baseline Rate: 135 bpm  Fetal Heart Rate: 130 BPM  FHR Category: Category I      Vital Signs:   Vitals:    03/14/22 1900   BP: 131/84   Pulse: 83   Resp: 18   Temp: 98 7 °F (37 1 °C)   SpO2:        Notes/comments:   Fabby Kramer is very uncomfortable with her contractions  She is also having back pain  Epidural boluses by anesthesia provide relief but she gets no relief when she presses the pump herself  Epidural pump has been changed by nurses  No interval cervical change  Fetal heart tracing remains category I  Discussed with Fabby Kramer and her  that I cannot override her epidural and give more narcotics at this time  We will await anesthesia's evaluation  Discussed with Dr Silverio Rodriguez MD 3/14/2022 8:15 PM

## 2022-03-15 NOTE — OP NOTE
OPERATIVE REPORT  PATIENT NAME: Joyce Longoria    :  1990  MRN: [id number]  Pt Location: [facility] L&D OR ROOM 01    SURGERY DATE: 3/14/2022    Surgeon(s) and Role:     * Marleen Klein MD - Primary     * Santana Jansen MD - Assisting    Preop Diagnosis:  1  Pregnancy at 39w2d  2  Induced labor  3  Arrest of dilation  4  Chronic hypertension with superimposed preeclmapsia without severe features  5  A2GDM  6  IVF pregnancy    Postop Diagnoses:  1  Same as above  2  Status post primary low transverse  section    Procedure(s) (LRB):   SECTION () (N/A)    Specimen(s):  ID Type Source Tests Collected by Time Destination   1 :  Tissue (Placenta on Hold) OB Only Placenta TISSUE EXAM OB (PLACENTA) ONLY Marleen Klein MD 3/14/2022 2149    A :  Cord Blood Cord BLOOD GAS, VENOUS, CORD Marleen Klein MD 3/14/2022 2149    B :  Cord Blood Cord BLOOD GAS, ARTERIAL, CORD Marleen Klein MD 3/14/2022 2149        Quantified Blood Loss: 644mL    Drains:  Urethral Catheter Double-lumen;Non-latex 16 Fr  (Active)   Site Assessment Clean;Skin intact; Patent 22   Guzman Care Done 22   Collection Container Standard drainage bag 22   Securement Method Securing device (Describe) 22   Output (mL) 550 mL 22   Number of days: 0       Anesthesia Type:   Epidural    Operative Indications:  1  Pregnancy at 39w2d  2  Arrest of dilation    Operative Findings:  1  No adhesive disease from skin to uterus  2  Gravid, normal appearing uterus  3  Viable male  delivered at 2312 with APGARS of 9 and 9 at 1 and 5 minutes respectively  Birth weight 9lbs 10 2oz  4  Intact placenta with eccentric insertion of 3 vessel umbilical cord  5  Normal appearing fallopian tubes and ovaries bilaterally  6   Arterial & Venous Blood Gases:  CORD, Blood gas, venous   Result Value Ref Range    pH, Cord Anatoliy 7 360 7 190 - 7 490    pCO2, Cord Anatoliy 30 6 27 0 - 43 0 mm HG    pO2, Cord Anatoliy 40 9 15 0 - 45 0 mm HG    HCO3, Cord Anatoliy 16 9 12 2 - 28 6 mmol/L    Base Exc, Cord Anatoliy -7 3 (L) 1 0 - 9 0 mmol/L    O2 Cont, Cord Anatoliy 15 9 mL/dL    O2 HGB,VENOUS CORD 84 3 %   CORD, Blood gas, arterial   Result Value Ref Range    pH, Cord Art 7 356 7 230 - 7 430    pCO2, Cord Art 35 3 30 0 - 60 0    pO2, Cord Art 40 3 (H) 5 0 - 25 0 mm HG    HCO3, Cord Art 19 3 17 3 - 27 3 mmol/L    Base Exc, Cord Art -5 4 (L) 3 0 - 11 0 mmol/L    O2 Content, Cord Art 15 8 ml/dl    O2 Hgb, Arterial Cord 08 8 %     Complications:   None apaprent    Procedure and Technique:  The patient was taken to the operating room  Epidural anesthesia and velásquez catheter were in place from the labor room  Ancef and Azithromycin were administered intravenously for preoperative surgical prophylaxis  The patient was then placed in the dorsal supine position with a left tilt of the hips  Fetal heart tones were confirmed  Epidural ws confirmed to be adequate  Betadine was used to prep the vagina and perineum  Chloraprep was used to prep the abdomen  She was then draped in the usual sterile fashion  A time out was performed to confirm correct patient and correct procedure  A Pfannenstiel incision was made in the skin with a surgical scalpel  Sharp dissection was carried out over subsequent layers of tissue down to the level of the fascia with the Bovie  The fascia was incised at the midline and the incision was extended bilaterally using the Bovie  The superior edge of the fascial incision was grasped with Kocher clamps, tented up and the underlying rectus muscles were dissected off  The rectus muscles were then divided at midline and the peritoneum was identified, tented up at its upper margin taking care to avoid the bladder, and then entered  The peritoneal incision was extended superiorly and inferiorly  The pericolic gutters were packed with 2 moist laps  The bladder blade was inserted and the vesicouterine peritoneum was identified   A transverse incision was made in the lower uterine segment using a new surgical blade  Clear amniotic fluid was noted  The uterine incision was extended cephalad and caudal using blunt dissection  The surgeon's hand was placed into the uterine cavity  The fetal head was identified and elevated through the uterine incision with the assistance of fundal pressure  There was no nuchal cord noted  With gentle traction, the shoulders were delivered followed by the rest of the fetal body  Following delayed cord clamping, the umbilical cord was doubly clamped and cut  The infant was then passed off the table to the awaiting  staff  The  was noted to cry spontaneously and moved all extremities  Venous and arterial blood gas, cord blood, and portion of cord was obtained for analysis and routine blood testing  The placenta delivered with uterine massage and was noted to be intact with eccentric insertion of a 3 vessel umbilical cord  The placenta was sent to storage  Oxytocin was administered by IV infusion to enhance uterine contraction  The uterus was exteriorized and cleared of all clots and remaining products of conception  The hysterotomy was reapproximated using 0-Vicryl in a running locked fashion  A second horizontal imbricating stitch with the same suture was applied  Hemostasis was achieved  The posterior cul-de-sac was cleared of all clots and products of conception  The uterus was replaced into the abdomen and the moist laps removed from the pericolic gutters  Hemostasis was once again confirmed at the hysterotomy  The fascia was reapproximated using 0-Vicryl in a running nonlocked fashion  Good hemostasis of the subcutaneous tissue was confirmed  The skin incision was closed in a running subcuticular fashion with 4-0 Monocryl  Good hemostasis was noted  Exofin was applied  The uterus was expressed of clots  Patient tolerated the procedure well    All needle, sponge, and instrument counts were noted to be correct x 2 at the end of the procedure  Patient was transferred to the recovery room in stable condition  Dr Neptali Hamilton was present for the procedure      Patient Disposition:  Recovery in L&D 309      SIGNATURE: Shubham Carlos MD  DATE: March 14, 2022  TIME: 11:56 PM

## 2022-03-15 NOTE — OB LABOR/OXYTOCIN SAFETY PROGRESS
Oxytocin Safety Progress Check Note - Zeny Figueredo 28 y o  female MRN: [id number]    Unit/Bed#: L&D 325-01 Encounter: [id number]    Dose (kwadwo-units/min) Oxytocin: 0 kwadwo-units/min  Contraction Frequency (minutes): 2 5-3  Contraction Quality: Moderate  Tachysystole: No   Cervical Dilation: 6        Cervical Effacement: 80  Fetal Station: -2  Baseline Rate: 135 bpm  Fetal Heart Rate: 130 BPM  FHR Category: Category I               Vital Signs:   Vitals:    22 1900   BP: 131/84   Pulse: 83   Resp: 18   Temp: 98 7 °F (37 1 °C)   SpO2:        Pitocin was stopped  Pt got bolus for epidural and iv hydration     Notes/comments:   SAFETY HUDDLE:  TRIGGER: Patient meets criteria for Arrest of Dilation    PARTICIPANTS: myself, pt and her  , anesthesia and rn     REVIEW OF CURRENT PLAN OF CARE AND MANAGEMENT:   Jose Faye is in the not in the active phase of labor  The cervical exam is 6 and has remained unchanged for the last 8 hours  During this time, the contraction strength has been no IUPC placed, but regular strong contraction for greater then 6 cm     Given lack of cervical change of 6 hours in the presence of inadequate or unknown contraction strength, the patient meets criteria for Arrest of Dilation  Maternal status is stable  Fetal status shows a Category 1 FHR tracing  I recommend delivery by  section for Arrest of Dilation  I have discussed the risks and benefits of  delivery with Min, including bleeding infection and injury to surrounding tissue   TIMELINE FOR NEXT ASSESSMENT: will go to the OR at 915- goal to wash out some of the pitocin receptors to help decrease pph        ALL PARTICIPANTS IN AGREEMENT WITH PLAN OF CARE: Yes    IS PERRT REQUIRED: No       Trino Dong MD 3/14/2022 8:46 PM

## 2022-03-15 NOTE — DISCHARGE SUMMARY
Obstetrics Discharge Summary   Melita Jeffery 28 y o  female MRN: [id number]  Unit/Bed#: LD OR  Encounter: [id number]    Admission Date: 3/13/2022     Discharge Date: 3/16/22    Admitting Diagnoses:   1  Pregnancy at 39w1d  2  Chronic hypertension with superimposed preeclampsia without severe features  3  A2GDM  4  IVF pregnancy    Discharge Diagnoses:   1  Same, delivered  2  Status post primary low transverse  section    Procedures:   Primary low transverse  section    Admitting Attending: Roxi Young MD  Delivery Attending: Star Dodd MD  Discharge Attending: Jenny Mcguire MD    Hospital Course:   Melita Jeffery is a 28 y o   who was admitted at 36w3d for for induction due to chronic hypertension  Preeclampsia without severe features was diagnose don admission with a P/C ratio of 0 31  Initial cervical exam was 1/50/-3  She received a dose of cytotec and then an epidural  A velásquez balloon was placed for cervical ripening after she was comfortable with epidural  Artificial amniotomy was performed following expulsion of the velásquez balloon and she made change to 6/80/-2  She later met criteria for arrest of dilation and we proceeded with delivery by  section   She underwent an uncomplicated primary low transverse  section delivery on 3/14/2022 and delivered a viable male  at 2312  APGARS were 9, 9 at 1 and 5 minutes, respectively  Birth weight was 9lb 10 2oz  Placenta delivered without difficulty   was admitted to the  nursery  Patient tolerated the procedure well and was transferred to recovery in stable condition  Blood pressures in the postpartum period remained stable without oral antihypertensives  Her preoperative hemoglobin was 12 7g/dL, postoperative was 9 5  Her postoperative pain was well controlled with oral analgesics  On day of discharge, she was ambulating and able to reasonably perform all ADLs  She was voiding and had appropriate bowel function   Pain was well controlled  She was discharged home on post-operative day #2 without complications  Patient was instructed to follow up with her OB as an outpatient and was given appropriate warnings to call provider if she develops signs of infection or uncontrolled pain  Mom's blood type is A positive  RhoGAM was not indicated      Complications:   None    Condition at discharge:   good     Provisions for Follow-Up Care:  See after visit summary for information related to follow-up care and any pertinent home health orders  Disposition:   See After Visit Summary for discharge disposition information  Planned Readmission:   No    Discharge Medications:   Please see AVS for a complete list of discharge medications  Discharge instructions :   Please see AVS for complete discharge instructions      Carol Starks MD

## 2022-03-15 NOTE — PLAN OF CARE
Problem: PAIN - ADULT  Goal: Verbalizes/displays adequate comfort level or baseline comfort level  Description: Interventions:  - Encourage patient to monitor pain and request assistance  - Assess pain using appropriate pain scale  - Administer analgesics based on type and severity of pain and evaluate response  - Implement non-pharmacological measures as appropriate and evaluate response  - Consider cultural and social influences on pain and pain management  - Notify physician/advanced practitioner if interventions unsuccessful or patient reports new pain  Outcome: Progressing     Problem: INFECTION - ADULT  Goal: Absence or prevention of progression during hospitalization  Description: INTERVENTIONS:  - Assess and monitor for signs and symptoms of infection  - Monitor lab/diagnostic results  - Monitor all insertion sites, i e  indwelling lines, tubes, and drains  - Monitor endotracheal if appropriate and nasal secretions for changes in amount and color  - [place] appropriate cooling/warming therapies per order  - Administer medications as ordered  - Instruct and encourage patient and family to use good hand hygiene technique  - Identify and instruct in appropriate isolation precautions for identified infection/condition  Outcome: Progressing  Goal: Absence of fever/infection during neutropenic period  Description: INTERVENTIONS:  - Monitor WBC    Outcome: Progressing     Problem: SAFETY ADULT  Goal: Patient will remain free of falls  Description: INTERVENTIONS:  - Educate patient/family on patient safety including physical limitations  - Instruct patient to call for assistance with activity   - Consult OT/PT to assist with strengthening/mobility   - Keep Call bell within reach  - Keep bed low and locked with side rails adjusted as appropriate  - Keep care items and personal belongings within reach  - Initiate and maintain comfort rounds  - Make Fall Risk Sign visible to staff  - Apply yellow socks and bracelet for high fall risk patients  - Consider moving patient to room near nurses station  Outcome: Progressing  Goal: Maintain or return to baseline ADL function  Description: INTERVENTIONS:  -  Assess patient's ability to carry out ADLs; assess patient's baseline for ADL function and identify physical deficits which impact ability to perform ADLs (bathing, care of mouth/teeth, toileting, grooming, dressing, etc )  - Assess/evaluate cause of self-care deficits   - Assess range of motion  - Assess patient's mobility; develop plan if impaired  - Assess patient's need for assistive devices and provide as appropriate  - Encourage maximum independence but intervene and supervise when necessary  - Involve family in performance of ADLs  - Assess for home care needs following discharge   - Consider OT consult to assist with ADL evaluation and planning for discharge  - Provide patient education as appropriate  Outcome: Progressing  Goal: Maintains/Returns to pre admission functional level  Description: INTERVENTIONS:  - Perform BMAT or MOVE assessment daily    - Set and communicate daily mobility goal to care team and patient/family/caregiver     - Collaborate with rehabilitation services on mobility goals if consulted  - Out of bed for toileting  - Record patient progress and toleration of activity level   Outcome: Progressing     Problem: DISCHARGE PLANNING  Goal: Discharge to home or other facility with appropriate resources  Description: INTERVENTIONS:  - Identify barriers to discharge w/patient and caregiver  - Arrange for needed discharge resources and transportation as appropriate  - Identify discharge learning needs (meds, wound care, etc )  - Arrange for interpretive services to assist at discharge as needed  - Refer to Case Management Department for coordinating discharge planning if the patient needs post-hospital services based on physician/advanced practitioner order or complex needs related to functional status, cognitive ability, or social support system  Outcome: Progressing     Problem: BIRTH - VAGINAL/ SECTION  Goal: Fetal and maternal status remain reassuring during the birth process  Description: INTERVENTIONS:  - Monitor vital signs  - Monitor fetal heart rate  - Monitor uterine activity  - Monitor labor progression (vaginal delivery)  - DVT prophylaxis  - Antibiotic prophylaxis  Outcome: Progressing  Goal: Emotionally satisfying birthing experience for mother/fetus  Description: Interventions:  - Assess, plan, implement and evaluate the nursing care given to the patient in labor  - Advocate the philosophy that each childbirth experience is a unique experience and support the family's chosen level of involvement and control during the labor process   - Actively participate in both the patient's and family's teaching of the birth process  - Consider cultural, Shinto and age-specific factors and plan care for the patient in labor  Outcome: Progressing     Problem: Potential for Falls  Goal: Patient will remain free of falls  Description: INTERVENTIONS:  - Educate patient/family on patient safety including physical limitations  - Instruct patient to call for assistance with activity   - Consult OT/PT to assist with strengthening/mobility   - Keep Call bell within reach  - Keep bed low and locked with side rails adjusted as appropriate  - Keep care items and personal belongings within reach  - Initiate and maintain comfort rounds  - Make Fall Risk Sign visible to staff  - Apply yellow socks and bracelet for high fall risk patients  - Consider moving patient to room near nurses station  Outcome: Progressing

## 2022-03-16 VITALS
TEMPERATURE: 98.4 F | RESPIRATION RATE: 16 BRPM | SYSTOLIC BLOOD PRESSURE: 125 MMHG | DIASTOLIC BLOOD PRESSURE: 88 MMHG | HEIGHT: 64 IN | HEART RATE: 80 BPM | BODY MASS INDEX: 31.58 KG/M2 | WEIGHT: 185 LBS | OXYGEN SATURATION: 96 %

## 2022-03-16 PROCEDURE — 99024 POSTOP FOLLOW-UP VISIT: CPT | Performed by: OBSTETRICS & GYNECOLOGY

## 2022-03-16 RX ORDER — DOCUSATE SODIUM 100 MG/1
100 CAPSULE, LIQUID FILLED ORAL 2 TIMES DAILY
Refills: 0
Start: 2022-03-16 | End: 2022-05-20 | Stop reason: ALTCHOICE

## 2022-03-16 RX ORDER — OXYCODONE HYDROCHLORIDE 5 MG/1
5 TABLET ORAL EVERY 4 HOURS PRN
Qty: 15 TABLET | Refills: 0 | Status: SHIPPED | OUTPATIENT
Start: 2022-03-16 | End: 2022-03-26

## 2022-03-16 RX ORDER — IBUPROFEN 600 MG/1
600 TABLET ORAL EVERY 6 HOURS PRN
Qty: 30 TABLET | Refills: 0 | Status: SHIPPED | OUTPATIENT
Start: 2022-03-16 | End: 2022-05-20 | Stop reason: ALTCHOICE

## 2022-03-16 RX ORDER — ACETAMINOPHEN 325 MG/1
650 TABLET ORAL EVERY 4 HOURS PRN
Refills: 0
Start: 2022-03-16 | End: 2022-05-20 | Stop reason: ALTCHOICE

## 2022-03-16 RX ORDER — IBUPROFEN 600 MG/1
600 TABLET ORAL EVERY 6 HOURS PRN
Status: DISCONTINUED | OUTPATIENT
Start: 2022-03-16 | End: 2022-03-16 | Stop reason: HOSPADM

## 2022-03-16 RX ADMIN — ACETAMINOPHEN 650 MG: 325 TABLET ORAL at 09:47

## 2022-03-16 RX ADMIN — DOCUSATE SODIUM 100 MG: 100 CAPSULE ORAL at 09:46

## 2022-03-16 RX ADMIN — IBUPROFEN 600 MG: 600 TABLET ORAL at 06:20

## 2022-03-16 NOTE — PLAN OF CARE
Problem: PAIN - ADULT  Goal: Verbalizes/displays adequate comfort level or baseline comfort level  Description: Interventions:  - Encourage patient to monitor pain and request assistance  - Assess pain using appropriate pain scale  - Administer analgesics based on type and severity of pain and evaluate response  - Implement non-pharmacological measures as appropriate and evaluate response  - Consider cultural and social influences on pain and pain management  - Notify physician/advanced practitioner if interventions unsuccessful or patient reports new pain  3/15/2022 2313 by Leroy Torrez RN  Outcome: Progressing  3/15/2022 2313 by Leroy Torrez RN  Outcome: Progressing     Problem: INFECTION - ADULT  Goal: Absence or prevention of progression during hospitalization  Description: INTERVENTIONS:  - Assess and monitor for signs and symptoms of infection  - Monitor lab/diagnostic results  - Monitor all insertion sites, i e  indwelling lines, tubes, and drains  - Monitor endotracheal if appropriate and nasal secretions for changes in amount and color  - [place] appropriate cooling/warming therapies per order  - Administer medications as ordered  - Instruct and encourage patient and family to use good hand hygiene technique  - Identify and instruct in appropriate isolation precautions for identified infection/condition  3/15/2022 2313 by Leroy Torrez RN  Outcome: Progressing  3/15/2022 2313 by Leroy Torrez RN  Outcome: Progressing  Goal: Absence of fever/infection during neutropenic period  Description: INTERVENTIONS:  - Monitor WBC    3/15/2022 2313 by Leroy Torrez RN  Outcome: Progressing  3/15/2022 2313 by Leroy Torrez RN  Outcome: Progressing     Problem: SAFETY ADULT  Goal: Patient will remain free of falls  Description: INTERVENTIONS:  - Educate patient/family on patient safety including physical limitations  - Instruct patient to call for assistance with activity   - Consult OT/PT to assist with strengthening/mobility   - Keep Call bell within reach  - Keep bed low and locked with side rails adjusted as appropriate  - Keep care items and personal belongings within reach  - Initiate and maintain comfort rounds  - Make Fall Risk Sign visible to staff  - Apply yellow socks and bracelet for high fall risk patients  - Consider moving patient to room near nurses station  3/15/2022 2313 by Carol Ann Webb RN  Outcome: Progressing  3/15/2022 2313 by Carol Ann Webb RN  Outcome: Progressing  Goal: Maintain or return to baseline ADL function  Description: INTERVENTIONS:  -  Assess patient's ability to carry out ADLs; assess patient's baseline for ADL function and identify physical deficits which impact ability to perform ADLs (bathing, care of mouth/teeth, toileting, grooming, dressing, etc )  - Assess/evaluate cause of self-care deficits   - Assess range of motion  - Assess patient's mobility; develop plan if impaired  - Assess patient's need for assistive devices and provide as appropriate  - Encourage maximum independence but intervene and supervise when necessary  - Involve family in performance of ADLs  - Assess for home care needs following discharge   - Consider OT consult to assist with ADL evaluation and planning for discharge  - Provide patient education as appropriate  3/15/2022 2313 by Carol Ann Webb RN  Outcome: Progressing  3/15/2022 2313 by Carol Ann Webb RN  Outcome: Progressing  Goal: Maintains/Returns to pre admission functional level  Description: INTERVENTIONS:  - Perform BMAT or MOVE assessment daily    - Set and communicate daily mobility goal to care team and patient/family/caregiver     - Collaborate with rehabilitation services on mobility goals if consulted  - Out of bed for toileting  - Record patient progress and toleration of activity level   3/15/2022 2313 by Carol Ann Webb RN  Outcome: Progressing  3/15/2022 2313 by Carol Ann Webb RN  Outcome: Progressing     Problem: DISCHARGE PLANNING  Goal: Discharge to home or other facility with appropriate resources  Description: INTERVENTIONS:  - Identify barriers to discharge w/patient and caregiver  - Arrange for needed discharge resources and transportation as appropriate  - Identify discharge learning needs (meds, wound care, etc )  - Arrange for interpretive services to assist at discharge as needed  - Refer to Case Management Department for coordinating discharge planning if the patient needs post-hospital services based on physician/advanced practitioner order or complex needs related to functional status, cognitive ability, or social support system  Outcome: Progressing     Problem: Potential for Falls  Goal: Patient will remain free of falls  Description: INTERVENTIONS:  - Educate patient/family on patient safety including physical limitations  - Instruct patient to call for assistance with activity   - Consult OT/PT to assist with strengthening/mobility   - Keep Call bell within reach  - Keep bed low and locked with side rails adjusted as appropriate  - Keep care items and personal belongings within reach  - Initiate and maintain comfort rounds  - Make Fall Risk Sign visible to staff  - Apply yellow socks and bracelet for high fall risk patients  - Consider moving patient to room near nurses station  3/15/2022 2313 by Anatoly Faulkner RN  Outcome: Progressing  3/15/2022 2313 by Anatoly Faulkner RN  Outcome: Progressing     Problem: POSTPARTUM  Goal: Experiences normal postpartum course  Description: INTERVENTIONS:  - Monitor maternal vital signs  - Assess uterine involution and lochia  Outcome: Progressing  Goal: Appropriate maternal -  bonding  Description: INTERVENTIONS:  - Identify family support  - Assess for appropriate maternal/infant bonding   -Encourage maternal/infant bonding opportunities  - Referral to  or  as needed  Outcome: Progressing  Goal: Establishment of infant feeding pattern  Description: INTERVENTIONS:  - Assess breast/bottle feeding  - Refer to lactation as needed  Outcome: Progressing  Goal: Incision(s), wounds(s) or drain site(s) healing without S/S of infection  Description: INTERVENTIONS  - Assess and document dressing, incision, wound bed, drain sites and surrounding tissue  - Provide patient and family education  Outcome: Progressing

## 2022-03-16 NOTE — LACTATION NOTE
This note was copied from a baby's chart  Mother verbalized breastfeeding is going well  States baby is nursing better now  Denies need for assistance or supplies at this time  Dad at bedside at this time  Enc to call for assistance as needed,phone # given

## 2022-03-16 NOTE — PLAN OF CARE
Problem: PAIN - ADULT  Goal: Verbalizes/displays adequate comfort level or baseline comfort level  Description: Interventions:  - Encourage patient to monitor pain and request assistance  - Assess pain using appropriate pain scale  - Administer analgesics based on type and severity of pain and evaluate response  - Implement non-pharmacological measures as appropriate and evaluate response  - Consider cultural and social influences on pain and pain management  - Notify physician/advanced practitioner if interventions unsuccessful or patient reports new pain  Outcome: Progressing     Problem: INFECTION - ADULT  Goal: Absence or prevention of progression during hospitalization  Description: INTERVENTIONS:  - Assess and monitor for signs and symptoms of infection  - Monitor lab/diagnostic results  - Monitor all insertion sites, i e  indwelling lines, tubes, and drains  - Monitor endotracheal if appropriate and nasal secretions for changes in amount and color  - [place] appropriate cooling/warming therapies per order  - Administer medications as ordered  - Instruct and encourage patient and family to use good hand hygiene technique  - Identify and instruct in appropriate isolation precautions for identified infection/condition  Outcome: Progressing  Goal: Absence of fever/infection during neutropenic period  Description: INTERVENTIONS:  - Monitor WBC    Outcome: Progressing     Problem: SAFETY ADULT  Goal: Patient will remain free of falls  Description: INTERVENTIONS:  - Educate patient/family on patient safety including physical limitations  - Instruct patient to call for assistance with activity   - Consult OT/PT to assist with strengthening/mobility   - Keep Call bell within reach  - Keep bed low and locked with side rails adjusted as appropriate  - Keep care items and personal belongings within reach  - Initiate and maintain comfort rounds  - Make Fall Risk Sign visible to staff  - Apply yellow socks and bracelet for high fall risk patients  - Consider moving patient to room near nurses station  Outcome: Progressing  Goal: Maintain or return to baseline ADL function  Description: INTERVENTIONS:  -  Assess patient's ability to carry out ADLs; assess patient's baseline for ADL function and identify physical deficits which impact ability to perform ADLs (bathing, care of mouth/teeth, toileting, grooming, dressing, etc )  - Assess/evaluate cause of self-care deficits   - Assess range of motion  - Assess patient's mobility; develop plan if impaired  - Assess patient's need for assistive devices and provide as appropriate  - Encourage maximum independence but intervene and supervise when necessary  - Involve family in performance of ADLs  - Assess for home care needs following discharge   - Consider OT consult to assist with ADL evaluation and planning for discharge  - Provide patient education as appropriate  Outcome: Progressing  Goal: Maintains/Returns to pre admission functional level  Description: INTERVENTIONS:  - Perform BMAT or MOVE assessment daily    - Set and communicate daily mobility goal to care team and patient/family/caregiver     - Collaborate with rehabilitation services on mobility goals if consulted  - Out of bed for toileting  - Record patient progress and toleration of activity level   Outcome: Progressing     Problem: DISCHARGE PLANNING  Goal: Discharge to home or other facility with appropriate resources  Description: INTERVENTIONS:  - Identify barriers to discharge w/patient and caregiver  - Arrange for needed discharge resources and transportation as appropriate  - Identify discharge learning needs (meds, wound care, etc )  - Arrange for interpretive services to assist at discharge as needed  - Refer to Case Management Department for coordinating discharge planning if the patient needs post-hospital services based on physician/advanced practitioner order or complex needs related to functional status, cognitive ability, or social support system  Outcome: Progressing     Problem: Potential for Falls  Goal: Patient will remain free of falls  Description: INTERVENTIONS:  - Educate patient/family on patient safety including physical limitations  - Instruct patient to call for assistance with activity   - Consult OT/PT to assist with strengthening/mobility   - Keep Call bell within reach  - Keep bed low and locked with side rails adjusted as appropriate  - Keep care items and personal belongings within reach  - Initiate and maintain comfort rounds  - Make Fall Risk Sign visible to staff  - Apply yellow socks and bracelet for high fall risk patients  - Consider moving patient to room near nurses station  Outcome: Progressing     Problem: POSTPARTUM  Goal: Experiences normal postpartum course  Description: INTERVENTIONS:  - Monitor maternal vital signs  - Assess uterine involution and lochia  Outcome: Progressing  Goal: Appropriate maternal -  bonding  Description: INTERVENTIONS:  - Identify family support  - Assess for appropriate maternal/infant bonding   -Encourage maternal/infant bonding opportunities  - Referral to  or  as needed  Outcome: Progressing  Goal: Establishment of infant feeding pattern  Description: INTERVENTIONS:  - Assess breast/bottle feeding  - Refer to lactation as needed  Outcome: Progressing  Goal: Incision(s), wounds(s) or drain site(s) healing without S/S of infection  Description: INTERVENTIONS  - Assess and document dressing, incision, wound bed, drain sites and surrounding tissue  - Provide patient and family education  Outcome: Progressing

## 2022-03-16 NOTE — NURSING NOTE
Discharge education completed with pt  Handouts reviewed  "Save Your Life" magnet explained and given to pt  Questions encouraged and addressed

## 2022-03-16 NOTE — PROGRESS NOTES
Progress Note - OB/GYN   Mira Hu 28 y o  female MRN: [id number]  Unit/Bed#: L&D 309-01 Encounter: [id number]    Assessment:  Post partum Day #2 s/p 1LTCS for arrest of dilation, stable, baby in room    Plan:  1) Postoperative state   , Hgb 10 5 --> 9 5   Guzman catheter removed, passed void trial   DVT ppx: SCDs  2) cHTN w/SI PreE w/o SF   CBC/CMP WNL   No medications   BP: 110s-130s/60-80s  3) Continue routine post partum care   Encourage ambulation   Encourage breastfeeding   Anticipate discharge POD#3     Subjective/Objective   Chief Complaint:     Post delivery  Patient is doing well  Lochia WNL  Pain moderately controlled, has not taken pain medications recently  Subjective:     Pain: yes, cramping, improved with meds  Tolerating PO: yes  Voiding: yes  Flatus: yes  Ambulating: yes  Chest pain: no  Shortness of breath: no  Leg pain: no  Lochia: minimal    Objective:     Vitals: /84 (BP Location: Left arm)   Pulse 73   Temp 98 1 °F (36 7 °C) (Oral)   Resp 16   Ht 5' 4" (1 626 m)   Wt 83 9 kg (185 lb)   SpO2 97%   Breastfeeding Yes   BMI 31 76 kg/m²     I/O       03/14 0701  03/15 0700 03/15 0701  03/16 0700    I V  (mL/kg) 1000 (11 9)     Total Intake(mL/kg) 1000 (11 9)     Urine (mL/kg/hr) 3625 (1 8) 3300 (1 6)    Blood 644     Total Output 4269 3300    Net -3269 -3300                Lab Results   Component Value Date    WBC 15 27 (H) 03/15/2022    HGB 9 9 (L) 03/15/2022    HCT 29 6 (L) 03/15/2022    MCV 94 03/15/2022     03/15/2022       Physical Exam:     Gen: AAOx3, NAD  CV: RRR  Lungs: CTA b/l  Abd: Soft, non-tender, non-distended, no rebound or guarding  Uterine fundus firm and non-tender, 1 cm below the umbilicus   Incision c/d/I  Ext: Non tender    Narendra Mchugh MD  3/16/2022  6:17 AM

## 2022-03-17 ENCOUNTER — TRANSITIONAL CARE MANAGEMENT (OUTPATIENT)
Dept: FAMILY MEDICINE CLINIC | Facility: CLINIC | Age: 32
End: 2022-03-17

## 2022-03-23 LAB — PLACENTA IN STORAGE: NORMAL

## 2022-04-26 NOTE — PROGRESS NOTES
Post partum Visit      1   C section: - 3/14/22- neg  9lb 10oz    2  Breast feeding - exclusive    Breast pain or mastitis   Baby and me for lactation     3  Post partum depression screening    Risk - done at the doctor office with son   No concerns    Support at [facility]    4  Sex - not yet     5  Contraception / future fertility - northing now will be midnful of back to back pregnancy due to c section     6  Return to work - 6/11/22    7  Weight    Starting 135   Delivery 185   Total gain 53     Current -155    8    Annual    Pap 9/24/20 neg   Next due 2023

## 2022-04-29 ENCOUNTER — OFFICE VISIT (OUTPATIENT)
Dept: OBGYN CLINIC | Facility: MEDICAL CENTER | Age: 32
End: 2022-04-29

## 2022-04-29 VITALS
SYSTOLIC BLOOD PRESSURE: 120 MMHG | DIASTOLIC BLOOD PRESSURE: 80 MMHG | WEIGHT: 155 LBS | BODY MASS INDEX: 26.46 KG/M2 | HEIGHT: 64 IN

## 2022-04-29 DIAGNOSIS — O24.414 INSULIN CONTROLLED GESTATIONAL DIABETES MELLITUS (GDM) IN THIRD TRIMESTER: ICD-10-CM

## 2022-04-29 PROCEDURE — 99024 POSTOP FOLLOW-UP VISIT: CPT | Performed by: OBSTETRICS & GYNECOLOGY

## 2022-05-16 ENCOUNTER — ANNUAL EXAM (OUTPATIENT)
Dept: OBGYN CLINIC | Facility: CLINIC | Age: 32
End: 2022-05-16
Payer: COMMERCIAL

## 2022-05-16 VITALS
BODY MASS INDEX: 26.12 KG/M2 | SYSTOLIC BLOOD PRESSURE: 120 MMHG | WEIGHT: 153 LBS | HEIGHT: 64 IN | DIASTOLIC BLOOD PRESSURE: 76 MMHG

## 2022-05-16 DIAGNOSIS — Z01.419 ENCOUNTER FOR GYNECOLOGICAL EXAMINATION: Primary | ICD-10-CM

## 2022-05-16 PROCEDURE — G0476 HPV COMBO ASSAY CA SCREEN: HCPCS | Performed by: OBSTETRICS & GYNECOLOGY

## 2022-05-16 PROCEDURE — G0145 SCR C/V CYTO,THINLAYER,RESCR: HCPCS | Performed by: OBSTETRICS & GYNECOLOGY

## 2022-05-16 PROCEDURE — S0612 ANNUAL GYNECOLOGICAL EXAMINA: HCPCS | Performed by: OBSTETRICS & GYNECOLOGY

## 2022-05-16 NOTE — PROGRESS NOTES
ASSESSMENT & PLAN: Pk Gonzalez is a 28 y o  Joann Marija with normal gynecologic exam     1   Routine well woman exam done today  2  Pap and HPV:  The patient's last pap  was 2019  It was normal     Pap and cotesting was done today  Current ASCCP Guidelines reviewed  3   The following were reviewed in today's visit: breast self exam, family planning choices, exercise and healthy diet  4  Currently breastfeeding   5  A2GDM - has order for 2 hr gtt      CC:  Annual Gynecologic Examination    HPI: Pk Gonzalez is a 28 y o  Joann Marija who presents for annual gynecologic examination  She has the following concerns:  None   Not interested in birth control at this time      Health Maintenance:    She wears her seatbelt routinely  She does perform regular monthly self breast exams  She feels safe at home  Past Medical History:   Diagnosis Date    Abnormal Pap smear of cervix     Chronic hypertension affecting pregnancy     Female infertility     Gestational diabetes     HPV in female     Kidney stone     Varicella        Past Surgical History:   Procedure Laterality Date     SECTION      COLPOSCOPY      EXPLORATORY LAPAROTOMY      HYSTEROSCOPY W/ POLYPECTOMY      KIDNEY STONE SURGERY      NH  DELIVERY ONLY N/A 2022    Procedure:  SECTION (); Surgeon:  Allie Almanza MD;  Location: [facility];  Service: Obstetrics    TONSILLECTOMY      WISDOM TOOTH EXTRACTION         Past OB/Gyn History:  OB History        1    Para   1    Term   1            AB        Living   1       SAB        IAB        Ectopic        Multiple   0    Live Births   1                 Family History   Problem Relation Age of Onset    Lymphoma Mother         non- hodgkin's     Diabetes Mother     Hypertension Mother     Ovarian cancer Maternal Grandmother         diagnosed in 37'2    Breast cancer Maternal Grandmother         diagnosed in 42's    Breast cancer Paternal Grandmother         diagnosed in late 52's    Osteoporosis Paternal Grandmother     Hypertension Paternal Grandmother     Cervical cancer Maternal Aunt     Alzheimer's disease Family     No Known Problems Father     No Known Problems Maternal Grandfather     No Known Problems Paternal Grandfather     No Known Problems Sister     No Known Problems Brother     No Known Problems Sister     No Known Problems Brother     Colon cancer Neg Hx        Social History:  Social History     Socioeconomic History    Marital status: Single     Spouse name: Not on file    Number of children: Not on file    Years of education: Not on file    Highest education level: Not on file   Occupational History    Not on file   Tobacco Use    Smoking status: Never Smoker    Smokeless tobacco: Never Used   Vaping Use    Vaping Use: Never used   Substance and Sexual Activity    Alcohol use: Not Currently     Comment: occasionally prior to pregnancy only    Drug use: Never    Sexual activity: Yes     Partners: Male   Other Topics Concern    Not on file   Social History Narrative    Not on file     Social Determinants of Health     Financial Resource Strain: Not on file   Food Insecurity: Not on file   Transportation Needs: Not on file   Physical Activity: Not on file   Stress: Not on file   Social Connections: Not on file   Intimate Partner Violence: Not on file   Housing Stability: Not on file         Allergies   Allergen Reactions    Sulfamethoxazole-Trimethoprim Rash         Current Outpatient Medications:     Prenatal MV-Min-Fe Fum-FA-DHA (PRENATAL 1 PO), Take 1 tablet by mouth, Disp: , Rfl:     acetaminophen (TYLENOL) 325 mg tablet, Take 2 tablets (650 mg total) by mouth every 4 (four) hours as needed for mild pain (Patient not taking: No sig reported), Disp: , Rfl: 0    docusate sodium (COLACE) 100 mg capsule, Take 1 capsule (100 mg total) by mouth 2 (two) times a day (Patient not taking: No sig reported), Disp: , Rfl: 0    ferrous sulfate 325 (65 Fe) mg tablet, Take 325 mg by mouth daily with breakfast (Patient not taking: No sig reported), Disp: , Rfl:     ibuprofen (MOTRIN) 600 mg tablet, Take 1 tablet (600 mg total) by mouth every 6 (six) hours as needed for mild pain (Patient not taking: No sig reported), Disp: 30 tablet, Rfl: 0      Review of Systems  Constitutional :no fever, feels well, no tiredness, no recent weight gain or loss  ENT: no ear ache, no loss of hearing, no nosebleeds or nasal discharge, no sore throat or hoarseness  Cardiovascular: no complaints of slow or fast heart beat, no chest pain, no palpitations, no leg claudication or lower extremity edema  Respiratory: no complaints of shortness of shortness of breath, no SAGASTUME  Breasts:no complaints of breast pain, breast lump, or nipple discharge  Gastrointestinal: no complaints of abdominal pain, constipation, nausea, vomiting, or diarrhea or bloody stools  Genitourinary : no complaints of dysuria, incontinence, pelvic pain, no dysmenorrhea, vaginal discharge or abnormal vaginal bleeding and as noted in HPI  Musculoskeletal: no complaints of arthralgia, no myalgia, no joint swelling or stiffness, no limb pain or swelling  Integumentary: no complaints of skin rash or lesion, itching or dry skin  Neurological: no complaints of headache, no confusion, no numbness or tingling, no dizziness or fainting    Objective      /76 (BP Location: Right arm, Patient Position: Sitting, Cuff Size: Standard)   Ht 5' 4" (1 626 m)   Wt 69 4 kg (153 lb)   LMP 05/10/2022   BMI 26 26 kg/m²   General:   appears stated age, cooperative, alert normal mood and affect   Lungs: Unlabored breathing    Breasts: normal appearance, no masses or tenderness   Abdomen: soft, non-tender, without masses or organomegaly   Vulva: normal   Vagina: normal vagina, no discharge, exudate, lesion, or erythema   Urethra: normal   Cervix: Normal, no discharge  Nontender     Uterus: normal and normal size, contour, position, consistency, mobility, non-tender   Adnexa: no mass, fullness, tenderness   Psychiatric orientation to person, place, and time: normal  mood and affect: normal

## 2022-05-17 LAB
HPV HR 12 DNA CVX QL NAA+PROBE: NEGATIVE
HPV16 DNA CVX QL NAA+PROBE: NEGATIVE
HPV18 DNA CVX QL NAA+PROBE: NEGATIVE

## 2022-05-20 ENCOUNTER — OFFICE VISIT (OUTPATIENT)
Dept: FAMILY MEDICINE CLINIC | Facility: CLINIC | Age: 32
End: 2022-05-20
Payer: COMMERCIAL

## 2022-05-20 VITALS
WEIGHT: 155.2 LBS | HEIGHT: 64 IN | OXYGEN SATURATION: 99 % | HEART RATE: 76 BPM | DIASTOLIC BLOOD PRESSURE: 60 MMHG | RESPIRATION RATE: 18 BRPM | SYSTOLIC BLOOD PRESSURE: 110 MMHG | TEMPERATURE: 98.7 F | BODY MASS INDEX: 26.5 KG/M2

## 2022-05-20 DIAGNOSIS — O24.414 INSULIN CONTROLLED GESTATIONAL DIABETES MELLITUS (GDM) IN THIRD TRIMESTER: ICD-10-CM

## 2022-05-20 DIAGNOSIS — O11.9 CHRONIC HYPERTENSION WITH SUPERIMPOSED PREECLAMPSIA: ICD-10-CM

## 2022-05-20 DIAGNOSIS — Z23 ENCOUNTER FOR IMMUNIZATION: ICD-10-CM

## 2022-05-20 DIAGNOSIS — Z00.00 WELL ADULT EXAM: Primary | ICD-10-CM

## 2022-05-20 DIAGNOSIS — F41.9 ANXIETY: ICD-10-CM

## 2022-05-20 PROCEDURE — 99395 PREV VISIT EST AGE 18-39: CPT | Performed by: FAMILY MEDICINE

## 2022-05-20 NOTE — PROGRESS NOTES
Assessment/Plan     Healthy female exam    1  UTD with COVID-19 vaccine  2  Patient Counseling:  --Nutrition: Stressed importance of moderation in sodium/caffeine intake, saturated fat and cholesterol, caloric balance, sufficient intake of fresh fruits, vegetables, fiber, calcium, iron, and 1 mg of folate supplement per day (for females capable of pregnancy)  --Discussed the issue of estrogen replacement, calcium supplement, and the daily use of baby aspirin  --Exercise: Stressed the importance of regular exercise  --Substance Abuse: Discussed cessation/primary prevention of tobacco, alcohol, or other drug use; driving or other dangerous activities under the influence; availability of treatment for abuse  --Sexuality: Discussed sexually transmitted diseases, partner selection, use of condoms, avoidance of unintended pregnancy  and contraceptive alternatives  --Injury prevention: Discussed safety belts, safety helmets, smoke detector, smoking near bedding or upholstery  --Dental health: Discussed importance of regular tooth brushing, flossing, and dental visits  --Immunizations reviewed  --Discussed benefits of screening colonoscopy  --After hours service discussed with patient    3  Discussed the patient's BMI with her  The BMI is in the acceptable range  4  Follow up as needed for acute illness  Subjective     Bowen Akhtar is a 28 y o  female and is here for a comprehensive physical exam  The patient reports no problems  Do you take any herbs or supplements that were not prescribed by a doctor? no  Are you taking calcium supplements? no  Are you taking aspirin daily? no     History:  LMP: Patient's last menstrual period was 05/10/2022    Menopause at n/a years  Last pap date:   Abnormal pap? no  : 1  Para: 1    The following portions of the patient's history were reviewed and updated as appropriate:   She  has a past medical history of Abnormal Pap smear of cervix, Chronic hypertension affecting pregnancy, Female infertility, Gestational diabetes, HPV in female, Kidney stone, and Varicella  She   Patient Active Problem List    Diagnosis Date Noted    Insulin controlled gestational diabetes mellitus (GDM) in third trimester 2022    Pregnancy resulting from in vitro fertilization, third trimester 2022    Chronic hypertension with superimposed preeclampsia 2021    Delivery of pregnancy by  section 2021    Anxiety 2020     She  has a past surgical history that includes Larose tooth extraction; Tonsillectomy; Kidney stone surgery; Exploratory laparotomy; Hysteroscopy w/ polypectomy; Colposcopy; pr  delivery only (N/A, 2022); and  section  Her family history includes Alzheimer's disease in her family; Breast cancer in her maternal grandmother and paternal grandmother; Cervical cancer in her maternal aunt; Diabetes in her mother; Hypertension in her mother and paternal grandmother; Lymphoma in her mother; No Known Problems in her brother, brother, father, maternal grandfather, paternal grandfather, sister, and sister; Osteoporosis in her paternal grandmother; Ovarian cancer in her maternal grandmother  She  reports that she has never smoked  She has never used smokeless tobacco  She reports previous alcohol use  She reports that she does not use drugs  Current Outpatient Medications   Medication Sig Dispense Refill    Prenatal MV-Min-Fe Fum-FA-DHA (PRENATAL 1 PO) Take 1 tablet by mouth       No current facility-administered medications for this visit       Current Outpatient Medications on File Prior to Visit   Medication Sig    Prenatal MV-Min-Fe Fum-FA-DHA (PRENATAL 1 PO) Take 1 tablet by mouth    [DISCONTINUED] acetaminophen (TYLENOL) 325 mg tablet Take 2 tablets (650 mg total) by mouth every 4 (four) hours as needed for mild pain (Patient not taking: No sig reported)    [DISCONTINUED] docusate sodium (COLACE) 100 mg capsule Take 1 capsule (100 mg total) by mouth 2 (two) times a day (Patient not taking: No sig reported)    [DISCONTINUED] ferrous sulfate 325 (65 Fe) mg tablet Take 325 mg by mouth daily with breakfast (Patient not taking: No sig reported)    [DISCONTINUED] ibuprofen (MOTRIN) 600 mg tablet Take 1 tablet (600 mg total) by mouth every 6 (six) hours as needed for mild pain (Patient not taking: No sig reported)     No current facility-administered medications on file prior to visit  She is allergic to sulfamethoxazole-trimethoprim       Review of Systems  Do you have pain that bothers you in your daily life? no  Pertinent items are noted in HPI       Objective     /60   Pulse 76   Temp 98 7 °F (37 1 °C)   Resp 18   Ht 5' 4" (1 626 m)   Wt 70 4 kg (155 lb 3 2 oz)   LMP 05/10/2022   SpO2 99%   BMI 26 64 kg/m²     General Appearance:    Alert, cooperative, no distress, appears stated age   Head:    Normocephalic, without obvious abnormality, atraumatic   Eyes:    PERRL, conjunctiva/corneas clear, EOM's intact, fundi     benign, both eyes   Ears:    Normal TM's and external ear canals, both ears   Nose:   Nares normal, septum midline, mucosa normal, no drainage    or sinus tenderness   Throat:   Lips, mucosa, and tongue normal; teeth and gums normal   Neck:   Supple, symmetrical, trachea midline, no adenopathy;     thyroid:  no enlargement/tenderness/nodules; no carotid    bruit or JVD   Back:     Symmetric, no curvature, ROM normal, no CVA tenderness   Lungs:     Clear to auscultation bilaterally, respirations unlabored   Chest Wall:    No tenderness or deformity    Heart:    Regular rate and rhythm, S1 and S2 normal, no murmur, rub   or gallop       Abdomen:     Soft, non-tender, bowel sounds active all four quadrants,     no masses, no organomegaly           Extremities:   Extremities normal, atraumatic, no cyanosis or edema   Pulses:   2+ and symmetric all extremities   Skin:   Skin color, texture, turgor normal, no rashes or lesions   Lymph nodes:   Cervical, supraclavicular, and axillary nodes normal   Neurologic:   CNII-XII intact, normal strength, sensation and reflexes     throughout

## 2022-05-20 NOTE — PROGRESS NOTES
BMI Counseling: Body mass index is 26 64 kg/m²  The BMI is above normal  Nutrition recommendations include reducing portion sizes, decreasing overall calorie intake, 3-5 servings of fruits/vegetables daily, reducing fast food intake, consuming healthier snacks, decreasing soda and/or juice intake, moderation in carbohydrate intake, increasing intake of lean protein, reducing intake of saturated fat and trans fat and reducing intake of cholesterol  Exercise recommendations include moderate aerobic physical activity for 150 minutes/week, vigorous aerobic physical activity for 75 minutes/week, exercising 3-5 times per week, joining a gym and strength training exercises

## 2022-05-23 LAB
LAB AP GYN PRIMARY INTERPRETATION: NORMAL
Lab: NORMAL

## 2022-06-28 DIAGNOSIS — N61.0 MASTITIS: Primary | ICD-10-CM

## 2022-06-28 RX ORDER — AMOXICILLIN AND CLAVULANATE POTASSIUM 875; 125 MG/1; MG/1
1 TABLET, FILM COATED ORAL EVERY 12 HOURS SCHEDULED
Qty: 20 TABLET | Refills: 0 | Status: SHIPPED | OUTPATIENT
Start: 2022-06-28 | End: 2022-07-08

## 2022-07-07 ENCOUNTER — TELEPHONE (OUTPATIENT)
Dept: OBGYN CLINIC | Facility: CLINIC | Age: 32
End: 2022-07-07

## 2022-07-07 NOTE — TELEPHONE ENCOUNTER
----- Message from [facility] [place] sent at 6/27/2022  7:04 PM EDT -----  Regarding: FW: Mastitis  Please review  ----- Message -----  From: Francia Doyle  Sent: 6/27/2022  10:18 AM EDT  To: [facility]  Subject: Mastitis                                         Hello,  I have been breastfeeding my son and the past two days i have a lump in my breast and my nipple feels raw from him feeding  He drank some of the blood from my breast and spit it up  I contacted his pediatrician about this and she recommended I contact you for an antibiotic in maude it is and abscess  I have been trying to pump to release some of the pressure in my breast but it only helps a little bit

## 2022-07-07 NOTE — TELEPHONE ENCOUNTER
Called about her breast complaints    She reports - the pain and lumps  Advised to call and speak to a nurse today if still present   Sometime need to get PT to help with that also if has thrush or mastitis

## 2022-08-06 ENCOUNTER — APPOINTMENT (OUTPATIENT)
Dept: LAB | Facility: HOSPITAL | Age: 32
End: 2022-08-06
Payer: COMMERCIAL

## 2022-08-06 DIAGNOSIS — F41.9 ANXIETY: ICD-10-CM

## 2022-08-06 DIAGNOSIS — O11.9 CHRONIC HYPERTENSION WITH SUPERIMPOSED PREECLAMPSIA: ICD-10-CM

## 2022-08-06 DIAGNOSIS — Z00.00 WELL ADULT EXAM: ICD-10-CM

## 2022-08-06 DIAGNOSIS — O24.414 INSULIN CONTROLLED GESTATIONAL DIABETES MELLITUS (GDM) IN THIRD TRIMESTER: ICD-10-CM

## 2022-08-06 LAB
ALBUMIN SERPL BCP-MCNC: 4 G/DL (ref 3.5–5)
ALP SERPL-CCNC: 84 U/L (ref 46–116)
ALT SERPL W P-5'-P-CCNC: 18 U/L (ref 12–78)
ANION GAP SERPL CALCULATED.3IONS-SCNC: 10 MMOL/L (ref 4–13)
AST SERPL W P-5'-P-CCNC: 11 U/L (ref 5–45)
BILIRUB SERPL-MCNC: 0.58 MG/DL (ref 0.2–1)
BUN SERPL-MCNC: 13 MG/DL (ref 5–25)
CALCIUM SERPL-MCNC: 9.1 MG/DL (ref 8.3–10.1)
CHLORIDE SERPL-SCNC: 102 MMOL/L (ref 96–108)
CHOLEST SERPL-MCNC: 163 MG/DL
CO2 SERPL-SCNC: 26 MMOL/L (ref 21–32)
CREAT SERPL-MCNC: 0.83 MG/DL (ref 0.6–1.3)
EST. AVERAGE GLUCOSE BLD GHB EST-MCNC: 111 MG/DL
GFR SERPL CREATININE-BSD FRML MDRD: 94 ML/MIN/1.73SQ M
GLUCOSE P FAST SERPL-MCNC: 87 MG/DL (ref 65–99)
HBA1C MFR BLD: 5.5 %
HDLC SERPL-MCNC: 58 MG/DL
LDLC SERPL CALC-MCNC: 94 MG/DL (ref 0–100)
NONHDLC SERPL-MCNC: 105 MG/DL
POTASSIUM SERPL-SCNC: 3.9 MMOL/L (ref 3.5–5.3)
PROT SERPL-MCNC: 7.7 G/DL (ref 6.4–8.4)
SODIUM SERPL-SCNC: 138 MMOL/L (ref 135–147)
TRIGL SERPL-MCNC: 53 MG/DL
TSH SERPL DL<=0.05 MIU/L-ACNC: 0.83 UIU/ML (ref 0.45–4.5)

## 2022-08-06 PROCEDURE — 84443 ASSAY THYROID STIM HORMONE: CPT

## 2022-08-06 PROCEDURE — 80061 LIPID PANEL: CPT

## 2022-08-06 PROCEDURE — 83036 HEMOGLOBIN GLYCOSYLATED A1C: CPT

## 2022-08-06 PROCEDURE — 36415 COLL VENOUS BLD VENIPUNCTURE: CPT

## 2022-08-06 PROCEDURE — 80053 COMPREHEN METABOLIC PANEL: CPT

## 2023-01-14 ENCOUNTER — HOSPITAL ENCOUNTER (EMERGENCY)
Facility: HOSPITAL | Age: 33
Discharge: HOME | End: 2023-01-14
Attending: EMERGENCY MEDICINE

## 2023-01-14 VITALS
OXYGEN SATURATION: 98 % | TEMPERATURE: 98.3 F | SYSTOLIC BLOOD PRESSURE: 102 MMHG | WEIGHT: 150 LBS | RESPIRATION RATE: 18 BRPM | HEART RATE: 83 BPM | HEIGHT: 64 IN | BODY MASS INDEX: 25.61 KG/M2 | DIASTOLIC BLOOD PRESSURE: 57 MMHG

## 2023-01-14 DIAGNOSIS — R11.2 NAUSEA & VOMITING: Primary | ICD-10-CM

## 2023-01-14 LAB
ALBUMIN SERPL BCP-MCNC: 4 G/DL (ref 3.5–5)
ALP SERPL-CCNC: 92 U/L (ref 46–116)
ALT SERPL W P-5'-P-CCNC: 13 U/L (ref 12–78)
ANION GAP SERPL CALCULATED.3IONS-SCNC: 9 MMOL/L (ref 4–13)
AST SERPL W P-5'-P-CCNC: 14 U/L (ref 5–45)
BASOPHILS # BLD MANUAL: 0.1 THOUSAND/UL (ref 0–0.1)
BASOPHILS NFR MAR MANUAL: 1 % (ref 0–1)
BILIRUB SERPL-MCNC: 0.75 MG/DL (ref 0.2–1)
BUN SERPL-MCNC: 17 MG/DL (ref 5–25)
CALCIUM SERPL-MCNC: 9.1 MG/DL (ref 8.3–10.1)
CHLORIDE SERPL-SCNC: 105 MMOL/L (ref 96–108)
CO2 SERPL-SCNC: 26 MMOL/L (ref 21–32)
CREAT SERPL-MCNC: 0.81 MG/DL (ref 0.6–1.3)
EOSINOPHIL # BLD MANUAL: 0 THOUSAND/UL (ref 0–0.4)
EOSINOPHIL NFR BLD MANUAL: 0 % (ref 0–6)
ERYTHROCYTE [DISTWIDTH] IN BLOOD BY AUTOMATED COUNT: 12.8 % (ref 11.6–15.1)
FLUAV RNA RESP QL NAA+PROBE: NEGATIVE
FLUBV RNA RESP QL NAA+PROBE: NEGATIVE
GFR SERPL CREATININE-BSD FRML MDRD: 97 ML/MIN/1.73SQ M
GLUCOSE SERPL-MCNC: 124 MG/DL (ref 65–140)
HCT VFR BLD AUTO: 46.1 % (ref 34.8–46.1)
HGB BLD-MCNC: 15.3 G/DL (ref 11.5–15.4)
LACTATE SERPL-SCNC: 1.7 MMOL/L (ref 0.5–2)
LIPASE SERPL-CCNC: 142 U/L (ref 73–393)
LYMPHOCYTES # BLD AUTO: 0.69 THOUSAND/UL (ref 0.6–4.47)
LYMPHOCYTES # BLD AUTO: 7 % (ref 14–44)
MCH RBC QN AUTO: 30.5 PG (ref 26.8–34.3)
MCHC RBC AUTO-ENTMCNC: 33.2 G/DL (ref 31.4–37.4)
MCV RBC AUTO: 92 FL (ref 82–98)
MONOCYTES # BLD AUTO: 0.2 THOUSAND/UL (ref 0–1.22)
MONOCYTES NFR BLD: 2 % (ref 4–12)
NEUTROPHILS # BLD MANUAL: 8.92 THOUSAND/UL (ref 1.85–7.62)
NEUTS SEG NFR BLD AUTO: 90 % (ref 43–75)
PLATELET # BLD AUTO: 274 THOUSANDS/UL (ref 149–390)
PLATELET BLD QL SMEAR: ADEQUATE
PMV BLD AUTO: 9.7 FL (ref 8.9–12.7)
POTASSIUM SERPL-SCNC: 3.8 MMOL/L (ref 3.5–5.3)
PROT SERPL-MCNC: 7.7 G/DL (ref 6.4–8.4)
RBC # BLD AUTO: 5.01 MILLION/UL (ref 3.81–5.12)
RBC MORPH BLD: NORMAL
RSV RNA RESP QL NAA+PROBE: NEGATIVE
SARS-COV-2 RNA RESP QL NAA+PROBE: NEGATIVE
SODIUM SERPL-SCNC: 140 MMOL/L (ref 135–147)
WBC # BLD AUTO: 9.91 THOUSAND/UL (ref 4.31–10.16)

## 2023-01-14 RX ORDER — ONDANSETRON 2 MG/ML
4 INJECTION INTRAMUSCULAR; INTRAVENOUS ONCE
Status: COMPLETED | OUTPATIENT
Start: 2023-01-14 | End: 2023-01-14

## 2023-01-14 RX ORDER — KETOROLAC TROMETHAMINE 30 MG/ML
15 INJECTION, SOLUTION INTRAMUSCULAR; INTRAVENOUS ONCE
Status: COMPLETED | OUTPATIENT
Start: 2023-01-14 | End: 2023-01-14

## 2023-01-14 RX ORDER — ONDANSETRON 4 MG/1
4 TABLET, FILM COATED ORAL EVERY 6 HOURS
Qty: 12 TABLET | Refills: 0 | Status: SHIPPED | OUTPATIENT
Start: 2023-01-14

## 2023-01-14 RX ADMIN — SODIUM CHLORIDE 1000 ML: 0.9 INJECTION, SOLUTION INTRAVENOUS at 17:36

## 2023-01-14 RX ADMIN — ONDANSETRON 4 MG: 2 INJECTION INTRAMUSCULAR; INTRAVENOUS at 17:45

## 2023-01-14 RX ADMIN — KETOROLAC TROMETHAMINE 15 MG: 30 INJECTION, SOLUTION INTRAMUSCULAR at 17:45

## 2023-01-14 RX ADMIN — ONDANSETRON 4 MG: 2 INJECTION INTRAMUSCULAR; INTRAVENOUS at 18:57

## 2023-01-14 RX ADMIN — SODIUM CHLORIDE 1000 ML: 0.9 INJECTION, SOLUTION INTRAVENOUS at 18:56

## 2023-01-14 NOTE — ED PROVIDER NOTES
History  Chief Complaint   Patient presents with   • Flu Symptoms     This day at 260     27-year-old female presents the emergency department for evaluation of body aches, chills, nausea, vomiting onset of symptoms 0900 today  Patient reports son recently had the same which lasted approximately 24 hours and resolved on its own  Patient denies any significant abdominal pain  She denies diarrhea or constipation  She denies any dysuria, hematuria, urinary frequency  Reports this feels like it was 1 week ago and denies chance of pregnancy  She states she feels weak and dehydrated  Has been unable to keep anything down today  She denies cough, congestion, chest pain or shortness of breath  History provided by:  Patient  Flu Symptoms  Presenting symptoms: fatigue, myalgias, nausea and vomiting    Presenting symptoms: no cough, no diarrhea, no fever, no headaches, no rhinorrhea, no shortness of breath and no sore throat    Worsened by:  Nothing  Ineffective treatments:  None tried  Associated symptoms: chills, decreased appetite and decreased physical activity    Associated symptoms: no ear pain, no mental status change, no congestion, no neck stiffness and no syncope        Prior to Admission Medications   Prescriptions Last Dose Informant Patient Reported? Taking? Prenatal MV-Min-Fe Fum-FA-DHA (PRENATAL 1 PO)   Yes No   Sig: Take 1 tablet by mouth      Facility-Administered Medications: None       Past Medical History:   Diagnosis Date   • Abnormal Pap smear of cervix    • Chronic hypertension affecting pregnancy    • Female infertility    • Gestational diabetes    • HPV in female    • Kidney stone    • Varicella        Past Surgical History:   Procedure Laterality Date   •  SECTION     • COLPOSCOPY     • EXPLORATORY LAPAROTOMY     • HYSTEROSCOPY W/ POLYPECTOMY     • KIDNEY STONE SURGERY     • [place]  DELIVERY ONLY N/A 2022    Procedure:  SECTION (); Surgeon:  Sergio López MD [place];  Location: [facility];  Service: Obstetrics   • TONSILLECTOMY     • WISDOM TOOTH EXTRACTION         Family History   Problem Relation Age of Onset   • Lymphoma Mother         non- hodgkin's    • Diabetes Mother    • Hypertension Mother    • Ovarian cancer Maternal Grandmother         diagnosed in 37'2   • Breast cancer Maternal Grandmother         diagnosed in 42's   • Breast cancer Paternal Grandmother         diagnosed in late 52's   • Osteoporosis Paternal Grandmother    • Hypertension Paternal Grandmother    • Cervical cancer Maternal Aunt    • Alzheimer's disease Family    • No Known Problems Father    • No Known Problems Maternal Grandfather    • No Known Problems Paternal Grandfather    • No Known Problems Sister    • No Known Problems Brother    • No Known Problems Sister    • No Known Problems Brother    • Colon cancer Neg Hx      I have reviewed and agree with the history as documented  E-Cigarette/Vaping   • E-Cigarette Use Never User      E-Cigarette/Vaping Substances   • Nicotine No    • THC No    • CBD No    • Flavoring No    • Other No    • Unknown No      Social History     Tobacco Use   • Smoking status: Never   • Smokeless tobacco: Never   Vaping Use   • Vaping Use: Never used   Substance Use Topics   • Alcohol use: Not Currently     Comment: occasionally prior to pregnancy only   • Drug use: Never       Review of Systems   Constitutional: Positive for appetite change, chills, decreased appetite and fatigue  Negative for diaphoresis and fever  HENT: Negative for congestion, ear pain, rhinorrhea, sore throat and voice change  Respiratory: Negative for cough and shortness of breath  Cardiovascular: Negative  Negative for chest pain, palpitations and leg swelling  Gastrointestinal: Positive for nausea and vomiting  Negative for abdominal pain and diarrhea  Genitourinary: Negative  Musculoskeletal: Positive for myalgias  Negative for neck stiffness  Skin: Negative  Neurological: Positive for weakness  Negative for headaches  All other systems reviewed and are negative  Physical Exam  Physical Exam  Vitals and nursing note reviewed  Constitutional:       General: She is not in acute distress  Appearance: Normal appearance  She is ill-appearing  She is not toxic-appearing or diaphoretic  HENT:      Head: Normocephalic  Nose: Nose normal       Mouth/Throat:      Mouth: Mucous membranes are moist       Pharynx: Oropharynx is clear  No oropharyngeal exudate or posterior oropharyngeal erythema  Eyes:      Conjunctiva/sclera: Conjunctivae normal       Pupils: Pupils are equal, round, and reactive to light  Cardiovascular:      Rate and Rhythm: Normal rate and regular rhythm  Pulmonary:      Effort: Pulmonary effort is normal  No respiratory distress  Breath sounds: Normal breath sounds  No stridor  No wheezing or rhonchi  Chest:      Chest wall: No tenderness  Abdominal:      General: Abdomen is flat  Bowel sounds are normal  There is no distension  Palpations: Abdomen is soft  Tenderness: There is no abdominal tenderness  There is no right CVA tenderness, left CVA tenderness or guarding  Musculoskeletal:         General: Normal range of motion  Cervical back: Normal range of motion  Skin:     General: Skin is warm and dry  Capillary Refill: Capillary refill takes less than 2 seconds  Findings: No bruising, erythema or rash  Neurological:      General: No focal deficit present  Mental Status: She is alert and oriented to person, place, and time     Psychiatric:         Mood and Affect: Mood normal          Behavior: Behavior normal          Vital Signs  ED Triage Vitals   Temperature Pulse Respirations Blood Pressure SpO2   01/14/23 1727 01/14/23 1727 01/14/23 1727 01/14/23 1727 01/14/23 1727   98 3 °F (36 8 °C) (!) 108 18 113/74 99 %      Temp Source Heart Rate Source Patient Position - Orthostatic VS BP Location FiO2 (%)   01/14/23 1727 01/14/23 1900 01/14/23 1900 01/14/23 1727 --   Oral Monitor Lying Left arm       Pain Score       01/14/23 1727       No Pain           Vitals:    01/14/23 1727 01/14/23 1900 01/14/23 1930   BP: 113/74 94/51 102/57   Pulse: (!) 108 84 83   Patient Position - Orthostatic VS:  Lying          Visual Acuity      ED Medications  Medications   sodium chloride 0 9 % bolus 1,000 mL (0 mL Intravenous Stopped 1/14/23 1836)   ondansetron (ZOFRAN) injection 4 mg (4 mg Intravenous Given 1/14/23 1745)   ketorolac (TORADOL) injection 15 mg (15 mg Intravenous Given 1/14/23 1745)   sodium chloride 0 9 % bolus 1,000 mL (1,000 mL Intravenous New Bag 1/14/23 1856)   ondansetron (ZOFRAN) injection 4 mg (4 mg Intravenous Given 1/14/23 1857)       Diagnostic Studies  Results Reviewed     Procedure Component Value Units Date/Time    CBC and differential [[id number]]  (Normal) Collected: 01/14/23 1738    Lab Status: Final result Specimen: Blood from Arm, Right Updated: 01/14/23 1846     WBC 9 91 Thousand/uL      RBC 5 01 Million/uL      Hemoglobin 15 3 g/dL      Hematocrit 46 1 %      MCV 92 fL      MCH 30 5 pg      MCHC 33 2 g/dL      RDW 12 8 %      MPV 9 7 fL      Platelets 775 Thousands/uL     Narrative: This is an appended report  These results have been appended to a previously verified report      Manual Differential(PHLEBS Do Not Order) [[id number]]  (Abnormal) Collected: 01/14/23 1738    Lab Status: Final result Specimen: Blood from Arm, Right Updated: 01/14/23 1846     Segmented % 90 %      Lymphocytes % 7 %      Monocytes % 2 %      Eosinophils, % 0 %      Basophils % 1 %      Absolute Neutrophils 8 92 Thousand/uL      Lymphocytes Absolute 0 69 Thousand/uL      Monocytes Absolute 0 20 Thousand/uL      Eosinophils Absolute 0 00 Thousand/uL      Basophils Absolute 0 10 Thousand/uL      Total Counted --     RBC Morphology Normal     Platelet Estimate Adequate    Lactic acid [[id number]]  (Normal) Collected: 01/14/23 1738    Lab Status: Final result Specimen: Blood from Arm, Right Updated: 01/14/23 1840     LACTIC ACID 1 7 mmol/L     Narrative:      Result may be elevated if tourniquet was used during collection  Comprehensive metabolic panel [[id number]] Collected: 01/14/23 1738    Lab Status: Final result Specimen: Blood from Arm, Right Updated: 01/14/23 1833     Sodium 140 mmol/L      Potassium 3 8 mmol/L      Chloride 105 mmol/L      CO2 26 mmol/L      ANION GAP 9 mmol/L      BUN 17 mg/dL      Creatinine 0 81 mg/dL      Glucose 124 mg/dL      Calcium 9 1 mg/dL      AST 14 U/L      ALT 13 U/L      Alkaline Phosphatase 92 U/L      Total Protein 7 7 g/dL      Albumin 4 0 g/dL      Total Bilirubin 0 75 mg/dL      eGFR 97 ml/min/1 73sq m     Narrative:      [facility] guidelines for Chronic Kidney Disease (CKD):   •  Stage 1 with normal or high GFR (GFR > 90 mL/min/1 73 square meters)  •  Stage 2 Mild CKD (GFR = 60-89 mL/min/1 73 square meters)  •  Stage 3A Moderate CKD (GFR = 45-59 mL/min/1 73 square meters)  •  Stage 3B Moderate CKD (GFR = 30-44 mL/min/1 73 square meters)  •  Stage 4 Severe CKD (GFR = 15-29 mL/min/1 73 square meters)  •  Stage 5 End Stage CKD (GFR <15 mL/min/1 73 square meters)  Note: GFR calculation is accurate only with a steady state creatinine    Lipase [[id number]]  (Normal) Collected: 01/14/23 1738    Lab Status: Final result Specimen: Blood from Arm, Right Updated: 01/14/23 1833     Lipase 142 u/L     FLU/RSV/COVID - if FLU/RSV clinically relevant [[id number]]  (Normal) Collected: 01/14/23 1738    Lab Status: Final result Specimen: Nares from Nose Updated: 01/14/23 1830     SARS-CoV-2 Negative     INFLUENZA A PCR Negative     INFLUENZA B PCR Negative     RSV PCR Negative    Narrative:      FOR PEDIATRIC PATIENTS - copy/paste COVID Guidelines URL to browser: [email or web address]    SARS-CoV-2 assay is a Nucleic Acid Amplification assay intended for the  qualitative detection of nucleic acid from SARS-CoV-2 in nasopharyngeal  swabs  Results are for the presumptive identification of SARS-CoV-2 RNA  Positive results are indicative of infection with SARS-CoV-2, the virus  causing COVID-19, but do not rule out bacterial infection or co-infection  with other viruses  Laboratories within the [place] and its  territories are required to report all positive results to the appropriate  public health authorities  Negative results do not preclude SARS-CoV-2  infection and should not be used as the sole basis for treatment or other  patient management decisions  Negative results must be combined with  clinical observations, patient history, and epidemiological information  This test has not been FDA cleared or approved  This test has been authorized by FDA under an Emergency Use Authorization  (EUA)  This test is only authorized for the duration of time the  declaration that circumstances exist justifying the authorization of the  emergency use of an in vitro diagnostic tests for detection of SARS-CoV-2  virus and/or diagnosis of COVID-19 infection under section 564(b)(1) of  the Act, 21 U  S C  662DSW-3(D)(9), unless the authorization is terminated  or revoked sooner  The test has been validated but independent review by FDA  and CLIA is pending  Test performed using [employer] GeneXpert: This RT-PCR assay targets N2,  a region unique to SARS-CoV-2  A conserved region in the E-gene was chosen  for pan-Sarbecovirus detection which includes SARS-CoV-2  According to CMS-2020-01-R, this platform meets the definition of high-throughput technology      UA (URINE) with reflex to Scope [[id number]]     Lab Status: No result Specimen: Urine     POCT pregnancy, urine [[id number]]     Lab Status: No result                  No orders to display              Procedures  Procedures         ED Course  ED Course as of 01/14/23 2054   Sat Jan 14, 2023   1757 WBC: 9 91   1757 Patient feeling improved  Tolerating PO intake    1856 Discussed all results and findings with the patient  States she feels  dehydrated still requesting a second liter of fluids  States she is unable to give a urine sample  Reports continued with some mild nausea   2038 Patient's urine was sent down to the lab without being labeled or scanned by accident  I discussed this with the patient  She does not feel symptoms are related to urinary tract infection  I also feel this is unlikely  At this point patient feels well enough for discharge home  Does not want to wait to provide a second urine sample as they have a  at home watching the child  We discussed symptomatic treatment at home and strict return precautions and she verbalized understanding  She was clinically and hemodynamically stable for discharge             Medical Decision Making  61-year-old female presented to the emergency department with nausea, vomiting, body aches onset of symptoms this morning  Vitals and medical record reviewed  On exam patient appears uncomfortable  Heart regular rate and rhythm  Lungs clear  Abdomen soft and nontender  Moist mucous membranes  Laboratory findings were unremarkable  No leukocytosis or lactic acidosis  Electrolytes within normal limits  Viral panel negative  Patient was rehydrated in the emergency department from vomiting  Nausea controlled with Zofran  Discussed results and findings with the patient including symptomatic care at home and strict return precautions and she verbalized understanding  She was clinically and hemodynamically stable for discharge    Nausea & vomiting: acute illness or injury  Amount and/or Complexity of Data Reviewed  Labs: ordered  Decision-making details documented in ED Course  Risk  Prescription drug management            Disposition  Final diagnoses:   Nausea & vomiting     Time reflects when diagnosis was documented in both MDM as applicable and the Disposition within this note     Time User Action Codes Description Comment    1/14/2023  8:40 PM Jennapetros Orrlles Add [R11 2] Nausea & vomiting       ED Disposition     ED Disposition   Discharge    Condition   Stable    Date/Time   Sat Jan 14, 2023  8:39 PM    Comment   Hannah Jacobson discharge to home/self care  Follow-up Information     Follow up With Specialties Details Why Contact Info    Nini Obrien MD [address] [place] [address]  [phone]            Patient's Medications   Discharge Prescriptions    ONDANSETRON (ZOFRAN) 4 MG TABLET    Take 1 tablet (4 mg total) by mouth every 6 (six) hours       Start Date: 1/14/2023 End Date: --       Order Dose: 4 mg       Quantity: 12 tablet    Refills: 0       No discharge procedures on file      PDMP Review     None          ED Provider  Electronically Signed by           Kathy Morillo PA-C  01/14/23 2054

## 2023-01-15 NOTE — DISCHARGE INSTRUCTIONS
Please use nausea medication as needed  Make sure you get plenty of fluid intake    Return with new or worsening symptoms

## 2023-06-09 ENCOUNTER — OFFICE VISIT (OUTPATIENT)
Dept: FAMILY MEDICINE CLINIC | Facility: CLINIC | Age: 33
End: 2023-06-09
Payer: COMMERCIAL

## 2023-06-09 VITALS
DIASTOLIC BLOOD PRESSURE: 65 MMHG | RESPIRATION RATE: 14 BRPM | HEIGHT: 64 IN | BODY MASS INDEX: 22.53 KG/M2 | HEART RATE: 83 BPM | TEMPERATURE: 97.6 F | SYSTOLIC BLOOD PRESSURE: 100 MMHG | WEIGHT: 132 LBS | OXYGEN SATURATION: 98 %

## 2023-06-09 DIAGNOSIS — Z13.21 ENCOUNTER FOR VITAMIN DEFICIENCY SCREENING: ICD-10-CM

## 2023-06-09 DIAGNOSIS — R53.83 OTHER FATIGUE: ICD-10-CM

## 2023-06-09 DIAGNOSIS — Z00.00 WELL ADULT EXAM: Primary | ICD-10-CM

## 2023-06-09 DIAGNOSIS — L65.9 HAIR LOSS: ICD-10-CM

## 2023-06-09 DIAGNOSIS — Z13.6 SCREENING FOR ISCHEMIC HEART DISEASE (IHD): ICD-10-CM

## 2023-06-09 PROCEDURE — 99395 PREV VISIT EST AGE 18-39: CPT | Performed by: FAMILY MEDICINE

## 2023-06-09 NOTE — PROGRESS NOTES
Assessment/Plan     Healthy female exam      1  COVID UTD  2  Patient Counseling:  --Nutrition: Stressed importance of moderation in sodium/caffeine intake, saturated fat and cholesterol, caloric balance, sufficient intake of fresh fruits, vegetables, fiber, calcium, iron, and 1 mg of folate supplement per day (for females capable of pregnancy)  --Discussed the issue of estrogen replacement, calcium supplement, and the daily use of baby aspirin  --Exercise: Stressed the importance of regular exercise  --Substance Abuse: Discussed cessation/primary prevention of tobacco, alcohol, or other drug use; driving or other dangerous activities under the influence; availability of treatment for abuse  --Sexuality: Discussed sexually transmitted diseases, partner selection, use of condoms, avoidance of unintended pregnancy  and contraceptive alternatives  --Injury prevention: Discussed safety belts, safety helmets, smoke detector, smoking near bedding or upholstery  --Dental health: Discussed importance of regular tooth brushing, flossing, and dental visits  --Immunizations reviewed  --Discussed benefits of screening colonoscopy  --After hours service discussed with patient    3  Discussed the patient's BMI with her  The BMI is in the acceptable range  4  Follow up as needed for acute illness  Demarcus Rollins is a 33 y o  female and is here for a comprehensive physical exam  The patient reports no problems  Do you take any herbs or supplements that were not prescribed by a doctor? no  Are you taking calcium supplements? no  Are you taking aspirin daily? no     History:      The following portions of the patient's history were reviewed and updated as appropriate:   She  has a past medical history of Abnormal Pap smear of cervix, Chronic hypertension affecting pregnancy, Female infertility, Gestational diabetes, HPV in female, Kidney stone, and Varicella    She   Patient Active Problem List    Diagnosis Date Noted   • Insulin controlled gestational diabetes mellitus (GDM) in third trimester 2022   • Pregnancy resulting from in vitro fertilization, third trimester 2022   • Chronic hypertension with superimposed preeclampsia 2021   • Delivery of pregnancy by  section 2021   • Anxiety 2020     She  has a past surgical history that includes [place] tooth extraction; Tonsillectomy; Kidney stone surgery; Exploratory laparotomy; Hysteroscopy w/ polypectomy; Colposcopy; pr  delivery only (N/A, 2022); and  section  Her family history includes Alzheimer's disease in her family; Breast cancer in her maternal grandmother and paternal grandmother; Cervical cancer in her maternal aunt; Diabetes in her mother; Hypertension in her mother and paternal grandmother; Lymphoma in her mother; No Known Problems in her brother, brother, father, maternal grandfather, paternal grandfather, sister, and sister; Osteoporosis in her paternal grandmother; Ovarian cancer in her maternal grandmother  She  reports that she has never smoked  She has never used smokeless tobacco  She reports that she does not currently use alcohol  She reports that she does not use drugs  Current Outpatient Medications   Medication Sig Dispense Refill   • ondansetron (ZOFRAN) 4 mg tablet Take 1 tablet (4 mg total) by mouth every 6 (six) hours 12 tablet 0     No current facility-administered medications for this visit  Current Outpatient Medications on File Prior to Visit   Medication Sig   • ondansetron (ZOFRAN) 4 mg tablet Take 1 tablet (4 mg total) by mouth every 6 (six) hours   • [DISCONTINUED] Prenatal MV-Min-Fe Fum-FA-DHA (PRENATAL 1 PO) Take 1 tablet by mouth     No current facility-administered medications on file prior to visit  She is allergic to sulfamethoxazole-trimethoprim       Review of Systems  Do you have pain that bothers you in your daily life? "no  Pertinent items are noted in HPI       Objective     /65 (BP Location: Left arm, Patient Position: Sitting, Cuff Size: Standard)   Pulse 83   Temp 97 6 °F (36 4 °C) (Tympanic)   Resp 14   Ht 5' 4\" (1 626 m)   Wt 59 9 kg (132 lb)   SpO2 98%   BMI 22 66 kg/m²     General Appearance:    Alert, cooperative, no distress, appears stated age   Head:    Normocephalic, without obvious abnormality, atraumatic   Eyes:    PERRL, conjunctiva/corneas clear, EOM's intact, fundi     benign, both eyes   Ears:    Normal TM's and external ear canals, both ears   Nose:   Nares normal, septum midline, mucosa normal, no drainage    or sinus tenderness   Throat:   Lips, mucosa, and tongue normal; teeth and gums normal   Neck:   Supple, symmetrical, trachea midline, no adenopathy;     thyroid:  no enlargement/tenderness/nodules; no carotid    bruit or JVD   Back:     Symmetric, no curvature, ROM normal, no CVA tenderness   Lungs:     Clear to auscultation bilaterally, respirations unlabored   Chest Wall:    No tenderness or deformity    Heart:    Regular rate and rhythm, S1 and S2 normal, no murmur, rub   or gallop       Abdomen:     Soft, non-tender, bowel sounds active all four quadrants,     no masses, no organomegaly           Extremities:   Extremities normal, atraumatic, no cyanosis or edema   Pulses:   2+ and symmetric all extremities   Skin:   Skin color, texture, turgor normal, no rashes or lesions   Lymph nodes:   Cervical, supraclavicular, and axillary nodes normal   Neurologic:   CNII-XII intact, normal strength, sensation and reflexes     throughout     Name: Davidson Raymond      : 1990      MRN: [id number]  Encounter Provider: Serene Claudio MD  Encounter Date: 2023   Encounter department: [address] [place] [address]  Other fatigue  -     CBC and differential; Future  -     Iron Panel (Includes Ferritin, Iron Sat%, Iron, and TIBC);  Future  -     Lipid panel; " Future  -     Comprehensive metabolic panel; Future; Expected date: 06/10/2023  -     TSH, 3rd generation; Future  -     T4, free; Future  -     HEMOGLOBIN A1C W/ EAG ESTIMATION; Future  -     Vitamin D 25 hydroxy; Future  -     Vitamin B12; Future    2  Hair loss  -     CBC and differential; Future  -     Iron Panel (Includes Ferritin, Iron Sat%, Iron, and TIBC); Future  -     Lipid panel; Future  -     Comprehensive metabolic panel; Future; Expected date: 06/10/2023  -     TSH, 3rd generation; Future  -     T4, free; Future  -     HEMOGLOBIN A1C W/ EAG ESTIMATION; Future  -     Vitamin D 25 hydroxy; Future  -     Vitamin B12; Future    3  Screening for ischemic heart disease (IHD)  -     CBC and differential; Future  -     Iron Panel (Includes Ferritin, Iron Sat%, Iron, and TIBC); Future  -     Lipid panel; Future  -     Comprehensive metabolic panel; Future; Expected date: 06/10/2023  -     TSH, 3rd generation; Future  -     T4, free; Future  -     HEMOGLOBIN A1C W/ EAG ESTIMATION; Future  -     Vitamin D 25 hydroxy; Future  -     Vitamin B12; Future    4  Encounter for vitamin deficiency screening  -     CBC and differential; Future  -     Iron Panel (Includes Ferritin, Iron Sat%, Iron, and TIBC); Future  -     Lipid panel; Future  -     Comprehensive metabolic panel; Future; Expected date: 06/10/2023  -     TSH, 3rd generation; Future  -     T4, free; Future  -     HEMOGLOBIN A1C W/ EAG ESTIMATION; Future  -     Vitamin D 25 hydroxy; Future  -     Vitamin B12; Future           Subjective      HPI  Review of Systems   All other systems reviewed and are negative        Current Outpatient Medications on File Prior to Visit   Medication Sig   • ondansetron (ZOFRAN) 4 mg tablet Take 1 tablet (4 mg total) by mouth every 6 (six) hours   • Prenatal MV-Min-Fe Fum-FA-DHA (PRENATAL 1 PO) Take 1 tablet by mouth       Objective     /65 (BP Location: Left arm, Patient Position: Sitting, Cuff Size: Standard)   Pulse 83 " Temp 97 6 °F (36 4 °C) (Tympanic)   Resp 14   Ht 5' 4\" (1 626 m)   Wt 59 9 kg (132 lb)   SpO2 98%   BMI 22 66 kg/m²     Physical Exam  Vitals and nursing note reviewed  Constitutional:       Appearance: Normal appearance  She is normal weight  Neurological:      Mental Status: She is alert         Clau Person MD  "

## 2023-06-10 ENCOUNTER — LAB (OUTPATIENT)
Dept: LAB | Facility: HOSPITAL | Age: 33
End: 2023-06-10
Payer: COMMERCIAL

## 2023-06-10 DIAGNOSIS — R53.83 OTHER FATIGUE: ICD-10-CM

## 2023-06-10 DIAGNOSIS — Z13.21 ENCOUNTER FOR VITAMIN DEFICIENCY SCREENING: ICD-10-CM

## 2023-06-10 DIAGNOSIS — Z13.6 SCREENING FOR ISCHEMIC HEART DISEASE (IHD): ICD-10-CM

## 2023-06-10 DIAGNOSIS — L65.9 HAIR LOSS: ICD-10-CM

## 2023-06-10 LAB
25(OH)D3 SERPL-MCNC: 31.2 NG/ML (ref 30–100)
ALBUMIN SERPL BCP-MCNC: 4.4 G/DL (ref 3.5–5)
ALP SERPL-CCNC: 67 U/L (ref 34–104)
ALT SERPL W P-5'-P-CCNC: 7 U/L (ref 7–52)
ANION GAP SERPL CALCULATED.3IONS-SCNC: 5 MMOL/L (ref 4–13)
AST SERPL W P-5'-P-CCNC: 11 U/L (ref 13–39)
BASOPHILS # BLD AUTO: 0.05 THOUSANDS/ÂΜL (ref 0–0.1)
BASOPHILS NFR BLD AUTO: 1 % (ref 0–1)
BILIRUB SERPL-MCNC: 0.48 MG/DL (ref 0.2–1)
BUN SERPL-MCNC: 13 MG/DL (ref 5–25)
CALCIUM SERPL-MCNC: 9.2 MG/DL (ref 8.4–10.2)
CHLORIDE SERPL-SCNC: 106 MMOL/L (ref 96–108)
CHOLEST SERPL-MCNC: 176 MG/DL
CO2 SERPL-SCNC: 26 MMOL/L (ref 21–32)
CREAT SERPL-MCNC: 0.72 MG/DL (ref 0.6–1.3)
EOSINOPHIL # BLD AUTO: 0.07 THOUSAND/ÂΜL (ref 0–0.61)
EOSINOPHIL NFR BLD AUTO: 1 % (ref 0–6)
ERYTHROCYTE [DISTWIDTH] IN BLOOD BY AUTOMATED COUNT: 12.7 % (ref 11.6–15.1)
FERRITIN SERPL-MCNC: 11 NG/ML (ref 11–307)
GFR SERPL CREATININE-BSD FRML MDRD: 111 ML/MIN/1.73SQ M
GLUCOSE P FAST SERPL-MCNC: 89 MG/DL (ref 65–99)
HCT VFR BLD AUTO: 44.3 % (ref 34.8–46.1)
HDLC SERPL-MCNC: 52 MG/DL
HGB BLD-MCNC: 14.4 G/DL (ref 11.5–15.4)
IMM GRANULOCYTES # BLD AUTO: 0.01 THOUSAND/UL (ref 0–0.2)
IMM GRANULOCYTES NFR BLD AUTO: 0 % (ref 0–2)
IRON SATN MFR SERPL: 22 % (ref 15–50)
IRON SERPL-MCNC: 81 UG/DL (ref 50–170)
LDLC SERPL CALC-MCNC: 105 MG/DL (ref 0–100)
LYMPHOCYTES # BLD AUTO: 1.79 THOUSANDS/ÂΜL (ref 0.6–4.47)
LYMPHOCYTES NFR BLD AUTO: 37 % (ref 14–44)
MCH RBC QN AUTO: 30.1 PG (ref 26.8–34.3)
MCHC RBC AUTO-ENTMCNC: 32.5 G/DL (ref 31.4–37.4)
MCV RBC AUTO: 93 FL (ref 82–98)
MONOCYTES # BLD AUTO: 0.33 THOUSAND/ÂΜL (ref 0.17–1.22)
MONOCYTES NFR BLD AUTO: 7 % (ref 4–12)
NEUTROPHILS # BLD AUTO: 2.62 THOUSANDS/ÂΜL (ref 1.85–7.62)
NEUTS SEG NFR BLD AUTO: 54 % (ref 43–75)
NONHDLC SERPL-MCNC: 124 MG/DL
NRBC BLD AUTO-RTO: 0 /100 WBCS
PLATELET # BLD AUTO: 292 THOUSANDS/UL (ref 149–390)
PMV BLD AUTO: 9.8 FL (ref 8.9–12.7)
POTASSIUM SERPL-SCNC: 4.1 MMOL/L (ref 3.5–5.3)
PROT SERPL-MCNC: 7.4 G/DL (ref 6.4–8.4)
RBC # BLD AUTO: 4.79 MILLION/UL (ref 3.81–5.12)
SODIUM SERPL-SCNC: 137 MMOL/L (ref 135–147)
T4 FREE SERPL-MCNC: 0.81 NG/DL (ref 0.61–1.12)
TIBC SERPL-MCNC: 361 UG/DL (ref 250–450)
TRIGL SERPL-MCNC: 93 MG/DL
TSH SERPL DL<=0.05 MIU/L-ACNC: 1.5 UIU/ML (ref 0.45–4.5)
VIT B12 SERPL-MCNC: 393 PG/ML (ref 180–914)
WBC # BLD AUTO: 4.87 THOUSAND/UL (ref 4.31–10.16)

## 2023-06-10 PROCEDURE — 82728 ASSAY OF FERRITIN: CPT

## 2023-06-10 PROCEDURE — 80053 COMPREHEN METABOLIC PANEL: CPT

## 2023-06-10 PROCEDURE — 82306 VITAMIN D 25 HYDROXY: CPT

## 2023-06-10 PROCEDURE — 82607 VITAMIN B-12: CPT

## 2023-06-10 PROCEDURE — 84439 ASSAY OF FREE THYROXINE: CPT

## 2023-06-10 PROCEDURE — 80061 LIPID PANEL: CPT

## 2023-06-10 PROCEDURE — 83036 HEMOGLOBIN GLYCOSYLATED A1C: CPT

## 2023-06-10 PROCEDURE — 83540 ASSAY OF IRON: CPT

## 2023-06-10 PROCEDURE — 84443 ASSAY THYROID STIM HORMONE: CPT

## 2023-06-10 PROCEDURE — 85025 COMPLETE CBC W/AUTO DIFF WBC: CPT

## 2023-06-10 PROCEDURE — 83550 IRON BINDING TEST: CPT

## 2023-06-10 PROCEDURE — 36415 COLL VENOUS BLD VENIPUNCTURE: CPT

## 2023-06-11 LAB
EST. AVERAGE GLUCOSE BLD GHB EST-MCNC: 105 MG/DL
HBA1C MFR BLD: 5.3 %

## 2023-07-15 ENCOUNTER — HOSPITAL ENCOUNTER (EMERGENCY)
Facility: HOSPITAL | Age: 33
Discharge: HOME | End: 2023-07-15
Attending: EMERGENCY MEDICINE
Payer: COMMERCIAL

## 2023-07-15 ENCOUNTER — APPOINTMENT (EMERGENCY)
Dept: CT IMAGING | Facility: HOSPITAL | Age: 33
End: 2023-07-15
Payer: COMMERCIAL

## 2023-07-15 VITALS
SYSTOLIC BLOOD PRESSURE: 111 MMHG | DIASTOLIC BLOOD PRESSURE: 70 MMHG | HEART RATE: 68 BPM | HEIGHT: 64 IN | TEMPERATURE: 97.6 F | BODY MASS INDEX: 22.53 KG/M2 | WEIGHT: 132 LBS | RESPIRATION RATE: 20 BRPM | OXYGEN SATURATION: 99 %

## 2023-07-15 DIAGNOSIS — R51.9 HEADACHE: Primary | ICD-10-CM

## 2023-07-15 LAB
ALBUMIN SERPL BCP-MCNC: 4.5 G/DL (ref 3.5–5)
ALP SERPL-CCNC: 66 U/L (ref 34–104)
ALT SERPL W P-5'-P-CCNC: 7 U/L (ref 7–52)
ANION GAP SERPL CALCULATED.3IONS-SCNC: 8 MMOL/L
AST SERPL W P-5'-P-CCNC: 12 U/L (ref 13–39)
BASOPHILS # BLD AUTO: 0.03 THOUSANDS/ÂΜL (ref 0–0.1)
BASOPHILS NFR BLD AUTO: 1 % (ref 0–1)
BILIRUB SERPL-MCNC: 0.45 MG/DL (ref 0.2–1)
BUN SERPL-MCNC: 7 MG/DL (ref 5–25)
CALCIUM SERPL-MCNC: 9.4 MG/DL (ref 8.4–10.2)
CHLORIDE SERPL-SCNC: 102 MMOL/L (ref 96–108)
CO2 SERPL-SCNC: 27 MMOL/L (ref 21–32)
CREAT SERPL-MCNC: 0.8 MG/DL (ref 0.6–1.3)
EOSINOPHIL # BLD AUTO: 0.04 THOUSAND/ÂΜL (ref 0–0.61)
EOSINOPHIL NFR BLD AUTO: 1 % (ref 0–6)
ERYTHROCYTE [DISTWIDTH] IN BLOOD BY AUTOMATED COUNT: 12.5 % (ref 11.6–15.1)
GFR SERPL CREATININE-BSD FRML MDRD: 97 ML/MIN/1.73SQ M
GLUCOSE SERPL-MCNC: 93 MG/DL (ref 65–140)
HCG SERPL QL: NEGATIVE
HCT VFR BLD AUTO: 43.6 % (ref 34.8–46.1)
HGB BLD-MCNC: 14.4 G/DL (ref 11.5–15.4)
IMM GRANULOCYTES # BLD AUTO: 0.02 THOUSAND/UL (ref 0–0.2)
IMM GRANULOCYTES NFR BLD AUTO: 0 % (ref 0–2)
LYMPHOCYTES # BLD AUTO: 1.63 THOUSANDS/ÂΜL (ref 0.6–4.47)
LYMPHOCYTES NFR BLD AUTO: 30 % (ref 14–44)
MCH RBC QN AUTO: 30.3 PG (ref 26.8–34.3)
MCHC RBC AUTO-ENTMCNC: 33 G/DL (ref 31.4–37.4)
MCV RBC AUTO: 92 FL (ref 82–98)
MONOCYTES # BLD AUTO: 0.35 THOUSAND/ÂΜL (ref 0.17–1.22)
MONOCYTES NFR BLD AUTO: 6 % (ref 4–12)
NEUTROPHILS # BLD AUTO: 3.42 THOUSANDS/ÂΜL (ref 1.85–7.62)
NEUTS SEG NFR BLD AUTO: 62 % (ref 43–75)
NRBC BLD AUTO-RTO: 0 /100 WBCS
PLATELET # BLD AUTO: 247 THOUSANDS/UL (ref 149–390)
PMV BLD AUTO: 10.2 FL (ref 8.9–12.7)
POTASSIUM SERPL-SCNC: 3.6 MMOL/L (ref 3.5–5.3)
PROT SERPL-MCNC: 7.5 G/DL (ref 6.4–8.4)
RBC # BLD AUTO: 4.75 MILLION/UL (ref 3.81–5.12)
SODIUM SERPL-SCNC: 137 MMOL/L (ref 135–147)
WBC # BLD AUTO: 5.49 THOUSAND/UL (ref 4.31–10.16)

## 2023-07-15 PROCEDURE — 36415 COLL VENOUS BLD VENIPUNCTURE: CPT | Performed by: PHYSICIAN ASSISTANT

## 2023-07-15 PROCEDURE — 85025 COMPLETE CBC W/AUTO DIFF WBC: CPT | Performed by: PHYSICIAN ASSISTANT

## 2023-07-15 PROCEDURE — 99284 EMERGENCY DEPT VISIT MOD MDM: CPT

## 2023-07-15 PROCEDURE — 96374 THER/PROPH/DIAG INJ IV PUSH: CPT

## 2023-07-15 PROCEDURE — 84703 CHORIONIC GONADOTROPIN ASSAY: CPT | Performed by: PHYSICIAN ASSISTANT

## 2023-07-15 PROCEDURE — 70450 CT HEAD/BRAIN W/O DYE: CPT

## 2023-07-15 PROCEDURE — G1004 CDSM NDSC: HCPCS

## 2023-07-15 PROCEDURE — 96375 TX/PRO/DX INJ NEW DRUG ADDON: CPT

## 2023-07-15 PROCEDURE — 96361 HYDRATE IV INFUSION ADD-ON: CPT

## 2023-07-15 PROCEDURE — 80053 COMPREHEN METABOLIC PANEL: CPT | Performed by: PHYSICIAN ASSISTANT

## 2023-07-15 RX ORDER — DIPHENHYDRAMINE HYDROCHLORIDE 50 MG/ML
50 INJECTION INTRAMUSCULAR; INTRAVENOUS ONCE
Status: COMPLETED | OUTPATIENT
Start: 2023-07-15 | End: 2023-07-15

## 2023-07-15 RX ORDER — DEXAMETHASONE SODIUM PHOSPHATE 10 MG/ML
10 INJECTION, SOLUTION INTRAMUSCULAR; INTRAVENOUS ONCE
Status: COMPLETED | OUTPATIENT
Start: 2023-07-15 | End: 2023-07-15

## 2023-07-15 RX ORDER — KETOROLAC TROMETHAMINE 30 MG/ML
15 INJECTION, SOLUTION INTRAMUSCULAR; INTRAVENOUS ONCE
Status: COMPLETED | OUTPATIENT
Start: 2023-07-15 | End: 2023-07-15

## 2023-07-15 RX ORDER — METHYLPREDNISOLONE 4 MG/1
TABLET ORAL
Qty: 21 TABLET | Refills: 0 | Status: SHIPPED | OUTPATIENT
Start: 2023-07-15 | End: 2023-07-25 | Stop reason: ALTCHOICE

## 2023-07-15 RX ORDER — ONDANSETRON 4 MG/1
4 TABLET, ORALLY DISINTEGRATING ORAL EVERY 6 HOURS PRN
Qty: 20 TABLET | Refills: 0 | Status: SHIPPED | OUTPATIENT
Start: 2023-07-15 | End: 2023-07-25 | Stop reason: ALTCHOICE

## 2023-07-15 RX ORDER — METOCLOPRAMIDE HYDROCHLORIDE 5 MG/ML
10 INJECTION INTRAMUSCULAR; INTRAVENOUS ONCE
Status: COMPLETED | OUTPATIENT
Start: 2023-07-15 | End: 2023-07-15

## 2023-07-15 RX ADMIN — DIPHENHYDRAMINE HYDROCHLORIDE 50 MG: 50 INJECTION, SOLUTION INTRAMUSCULAR; INTRAVENOUS at 12:19

## 2023-07-15 RX ADMIN — KETOROLAC TROMETHAMINE 15 MG: 30 INJECTION, SOLUTION INTRAMUSCULAR at 15:05

## 2023-07-15 RX ADMIN — SODIUM CHLORIDE 1000 ML: 0.9 INJECTION, SOLUTION INTRAVENOUS at 12:16

## 2023-07-15 RX ADMIN — DEXAMETHASONE SODIUM PHOSPHATE 10 MG: 10 INJECTION, SOLUTION INTRAMUSCULAR; INTRAVENOUS at 12:20

## 2023-07-15 RX ADMIN — METOCLOPRAMIDE 10 MG: 5 INJECTION, SOLUTION INTRAMUSCULAR; INTRAVENOUS at 12:18

## 2023-07-15 NOTE — Clinical Note
Lillian Rodriguez was seen and treated in our emergency department on 7/15/2023. Diagnosis:     Janet Iyer  is off the rest of the shift today, may return to work on return date. She may return on this date: 07/17/2023         If you have any questions or concerns, please don't hesitate to call.       Rosio Graham PA-C    ______________________________           _______________          _______________  Hospital Representative                              Date                                Time

## 2023-07-15 NOTE — ED PROVIDER NOTES
History  Chief Complaint   Patient presents with   • Migraine     Last few days and is having no relieve. Nausea today. Vomiting yesterday     Patient is a 50-year-old female with no past medical history of migraines who presents emerged department today with a chief complaint of headache since yesterday. The patient states that the headache is on the right side which is also affecting her eyes causing pressure behind her eyes. Patient is having intermittent posterior headache as well. The patient admits to nausea today but yesterday was nauseous and had vomiting. Patient denies any falls or traumas. Has been drinking Pedialyte as well as taken Tylenol Motrin did not take anything today thus far. Patient has no history of chronic headaches or migraines. Taking at home COVID test which was negative. Denies any cough congestion, belly pain, diarrhea shortness of breath chest pain      Headache - New Onset or New Symptoms  Pain location:  Generalized  Quality:  Unable to specify  Severity currently:  10/10  Severity at highest:  10/10  Onset quality:  Unable to specify  Duration:  2 days  Timing:  Constant  Progression:  Unchanged  Chronicity:  New  Similar to prior headaches: no    Relieved by:  Nothing  Worsened by:  Nothing  Ineffective treatments:  Acetaminophen, NSAIDs and resting in a darkened room  Associated symptoms: nausea and vomiting    Associated symptoms: no abdominal pain and no congestion    Nausea:     Timing:  Constant    Progression:  Unchanged  Vomiting:     Quality:  Stomach contents    Progression:  Resolved      Prior to Admission Medications   Prescriptions Last Dose Informant Patient Reported?  Taking?   ondansetron (ZOFRAN) 4 mg tablet   No No   Sig: Take 1 tablet (4 mg total) by mouth every 6 (six) hours      Facility-Administered Medications: None       Past Medical History:   Diagnosis Date   • Abnormal Pap smear of cervix    • Chronic hypertension affecting pregnancy    • Female infertility    • Gestational diabetes    • HPV in female    • Kidney stone    • Varicella        Past Surgical History:   Procedure Laterality Date   •  SECTION     • COLPOSCOPY     • EXPLORATORY LAPAROTOMY     • HYSTEROSCOPY W/ POLYPECTOMY     • KIDNEY STONE SURGERY     • [place]  DELIVERY ONLY N/A 2022    Procedure:  SECTION (); Surgeon: Valorie Alejandro MD;  Location: [facility];  Service: Obstetrics   • TONSILLECTOMY     • WISDOM TOOTH EXTRACTION         Family History   Problem Relation Age of Onset   • COPD Mother    • Lymphoma Mother         non- hodgkin's    • Diabetes Mother    • Hypertension Mother    • No Known Problems Father    • No Known Problems Sister    • No Known Problems Sister    • No Known Problems Brother    • No Known Problems Brother    • Ovarian cancer Maternal Grandmother         diagnosed in 42'4   • Breast cancer Maternal Grandmother         diagnosed in 42's   • No Known Problems Maternal Grandfather    • Breast cancer Paternal Grandmother         diagnosed in late 52's   • Osteoporosis Paternal Grandmother    • Hypertension Paternal Grandmother    • No Known Problems Paternal Grandfather    • Cervical cancer Maternal Aunt    • Alzheimer's disease Family    • Colon cancer Neg Hx      I have reviewed and agree with the history as documented. E-Cigarette/Vaping   • E-Cigarette Use Never User      E-Cigarette/Vaping Substances   • Nicotine No    • THC No    • CBD No    • Flavoring No    • Other No    • Unknown No      Social History     Tobacco Use   • Smoking status: Never   • Smokeless tobacco: Never   Vaping Use   • Vaping Use: Never used   Substance Use Topics   • Alcohol use: Not Currently     Comment: occasionally prior to pregnancy only   • Drug use: Never       Review of Systems   HENT: Negative for congestion. Gastrointestinal: Positive for nausea and vomiting. Negative for abdominal pain.    All other systems reviewed and are negative. Physical Exam  Physical Exam  Vitals and nursing note reviewed. Constitutional:       General: She is not in acute distress. Appearance: She is well-developed. HENT:      Head: Normocephalic and atraumatic. Eyes:      Conjunctiva/sclera: Conjunctivae normal.   Cardiovascular:      Rate and Rhythm: Normal rate and regular rhythm. Heart sounds: No murmur heard. Pulmonary:      Effort: Pulmonary effort is normal. No respiratory distress. Breath sounds: Normal breath sounds. Abdominal:      Palpations: Abdomen is soft. Tenderness: There is no abdominal tenderness. Musculoskeletal:         General: No swelling. Cervical back: Normal range of motion and neck supple. Skin:     General: Skin is warm and dry. Capillary Refill: Capillary refill takes less than 2 seconds. Neurological:      General: No focal deficit present. Mental Status: She is alert and oriented to person, place, and time. Motor: No weakness.    Psychiatric:         Mood and Affect: Mood normal.         Vital Signs  ED Triage Vitals [07/15/23 1157]   Temperature Pulse Respirations Blood Pressure SpO2   97.6 °F (36.4 °C) 70 16 127/88 100 %      Temp Source Heart Rate Source Patient Position - Orthostatic VS BP Location FiO2 (%)   Temporal Monitor Lying Left arm --      Pain Score       10 - Worst Possible Pain           Vitals:    07/15/23 1157 07/15/23 1500   BP: 127/88 111/70   Pulse: 70 68   Patient Position - Orthostatic VS: Lying Sitting         Visual Acuity  Visual Acuity    Flowsheet Row Most Recent Value   L Pupil Size (mm) 2   R Pupil Size (mm) 2          ED Medications  Medications   metoclopramide (REGLAN) injection 10 mg (10 mg Intravenous Given 7/15/23 1218)   diphenhydrAMINE (BENADRYL) injection 50 mg (50 mg Intravenous Given 7/15/23 1219)   sodium chloride 0.9 % bolus 1,000 mL (0 mL Intravenous Stopped 7/15/23 1331)   dexamethasone (PF) (DECADRON) injection 10 mg (10 mg Intravenous Given 7/15/23 1220)   ketorolac (TORADOL) injection 15 mg (15 mg Intravenous Given 7/15/23 1505)       Diagnostic Studies  Results Reviewed     Procedure Component Value Units Date/Time    hCG, qualitative pregnancy [[id number]]  (Normal) Collected: 07/15/23 1215    Lab Status: Final result Specimen: Blood from Arm, Right Updated: 07/15/23 1301     Preg, Serum Negative    Comprehensive metabolic panel [[id number]]  (Abnormal) Collected: 07/15/23 1215    Lab Status: Final result Specimen: Blood from Arm, Right Updated: 07/15/23 1255     Sodium 137 mmol/L      Potassium 3.6 mmol/L      Chloride 102 mmol/L      CO2 27 mmol/L      ANION GAP 8 mmol/L      BUN 7 mg/dL      Creatinine 0.80 mg/dL      Glucose 93 mg/dL      Calcium 9.4 mg/dL      AST 12 U/L      ALT 7 U/L      Alkaline Phosphatase 66 U/L      Total Protein 7.5 g/dL      Albumin 4.5 g/dL      Total Bilirubin 0.45 mg/dL      eGFR 97 ml/min/1.73sq m     Narrative:      [place] guidelines for Chronic Kidney Disease (CKD):   •  Stage 1 with normal or high GFR (GFR > 90 mL/min/1.73 square meters)  •  Stage 2 Mild CKD (GFR = 60-89 mL/min/1.73 square meters)  •  Stage 3A Moderate CKD (GFR = 45-59 mL/min/1.73 square meters)  •  Stage 3B Moderate CKD (GFR = 30-44 mL/min/1.73 square meters)  •  Stage 4 Severe CKD (GFR = 15-29 mL/min/1.73 square meters)  •  Stage 5 End Stage CKD (GFR <15 mL/min/1.73 square meters)  Note: GFR calculation is accurate only with a steady state creatinine    CBC and differential [[id number]] Collected: 07/15/23 1215    Lab Status: Final result Specimen: Blood from Arm, Right Updated: 07/15/23 1240     WBC 5.49 Thousand/uL      RBC 4.75 Million/uL      Hemoglobin 14.4 g/dL      Hematocrit 43.6 %      MCV 92 fL      MCH 30.3 pg      MCHC 33.0 g/dL      RDW 12.5 %      MPV 10.2 fL      Platelets 007 Thousands/uL      nRBC 0 /100 WBCs      Neutrophils Relative 62 %      Immat GRANS % 0 %      Lymphocytes Relative 30 %      Monocytes Relative 6 %      Eosinophils Relative 1 %      Basophils Relative 1 %      Neutrophils Absolute 3.42 Thousands/µL      Immature Grans Absolute 0.02 Thousand/uL      Lymphocytes Absolute 1.63 Thousands/µL      Monocytes Absolute 0.35 Thousand/µL      Eosinophils Absolute 0.04 Thousand/µL      Basophils Absolute 0.03 Thousands/µL                  CT head without contrast   Final Result by Caryl Espana MD (07/15 1509)      No acute intracranial abnormality. Workstation performed: [id number]                    Procedures  Procedures         ED Course  ED Course as of 07/15/23 1517   Sat Jul 15, 2023   1348 Improvement of headache                                SBIRT 22yo+    Flowsheet Row Most Recent Value   Initial Alcohol Screen: US AUDIT-C     1. How often do you have a drink containing alcohol? 0 Filed at: 07/15/2023 1152   2. How many drinks containing alcohol do you have on a typical day you are drinking? 0 Filed at: 07/15/2023 1158   3b. FEMALE Any Age, or MALE 65+: How often do you have 4 or more drinks on one occassion? 0 Filed at: 07/15/2023 115   Audit-C Score 0 Filed at: 07/15/2023 1155   LOW: How many times in the past year have you. .. Used an illegal drug or used a prescription medication for non-medical reasons? Never Filed at: 07/15/2023 1151                    Medical Decision Making  Patient is a 80-year-old female with no past medical history of migraines who presents emerged department today with a chief complaint of headache since yesterday. The patient states that the headache is on the right side which is also affecting her eyes causing pressure behind her eyes. Patient is having intermittent posterior headache as well. The patient admits to nausea today but yesterday was nauseous and had vomiting. Patient denies any falls or traumas. Has been drinking Pedialyte as well as taken Tylenol Motrin did not take anything today thus far.   Patient has no history of chronic headaches or migraines. Taking at home COVID test which was negative. Denies any cough congestion, belly pain, diarrhea shortness of breath chest pain    No focal neuro deficits. Labs normal. CT imaging normal.     Headache improved. Differential diagnosis included but not limited to sinusitis, intracranial abnormality, migraine, tension headache        Amount and/or Complexity of Data Reviewed  Labs: ordered. Radiology: ordered. Decision-making details documented in ED Course. Risk  Prescription drug management. Disposition  Final diagnoses:   Headache     Time reflects when diagnosis was documented in both MDM as applicable and the Disposition within this note     Time User Action Codes Description Comment    7/15/2023  3:01 PM Tiffany Roche Add [R51.9] Headache       ED Disposition     ED Disposition   Discharge    Condition   Stable    Date/Time   Sat Jul 15, 2023  3:01 PM    Comment   Deanna Bagley discharge to home/self care. Follow-up Information    None         Patient's Medications   Discharge Prescriptions    METHYLPREDNISOLONE 4 MG TABLET THERAPY PACK    Use as directed on package       Start Date: 7/15/2023 End Date: --       Order Dose: --       Quantity: 21 tablet    Refills: 0    ONDANSETRON (ZOFRAN ODT) 4 MG DISINTEGRATING TABLET    Take 1 tablet (4 mg total) by mouth every 6 (six) hours as needed for nausea or vomiting       Start Date: 7/15/2023 End Date: --       Order Dose: 4 mg       Quantity: 20 tablet    Refills: 0       No discharge procedures on file.     PDMP Review     None          ED Provider  Electronically Signed by           Jeanine Cabrera PA-C  07/15/23 2075

## 2023-07-25 ENCOUNTER — ANNUAL EXAM (OUTPATIENT)
Dept: OBGYN CLINIC | Facility: CLINIC | Age: 33
End: 2023-07-25
Payer: COMMERCIAL

## 2023-07-25 VITALS
BODY MASS INDEX: 22.43 KG/M2 | SYSTOLIC BLOOD PRESSURE: 110 MMHG | DIASTOLIC BLOOD PRESSURE: 68 MMHG | WEIGHT: 131.4 LBS | HEIGHT: 64 IN

## 2023-07-25 DIAGNOSIS — Z01.419 ENCOUNTER FOR GYNECOLOGICAL EXAMINATION (GENERAL) (ROUTINE) WITHOUT ABNORMAL FINDINGS: Primary | ICD-10-CM

## 2023-07-25 PROCEDURE — S0612 ANNUAL GYNECOLOGICAL EXAMINA: HCPCS | Performed by: OBSTETRICS & GYNECOLOGY

## 2023-07-25 NOTE — PATIENT INSTRUCTIONS
Thank you for your confidence in our team.   We appreciate you and welcome your feedback. If you receive a survey from us, please take a few moments to let us know how we are doing.    Sincerely,   Mercedes Kent MD

## 2023-07-25 NOTE — PROGRESS NOTES
ASSESSMENT & PLAN: Rigoberto Knight was seen today for gynecologic exam.    Diagnoses and all orders for this visit:    Encounter for gynecological examination (general) (routine) without abnormal findings          1. Routine well woman exam done today  2. Pap and HPV:  Patient's pap is current. Pap and cotesting was not done today. Current ASCCP Guidelines reviewed. 3.  Patient has had her Gardasil vaccination. Recommendations reviewed. 4.  The following were reviewed in today's visit: adequate intake of calcium and vitamin D, exercise and healthy diet. 5.  F/u in 1 year for next routine GYN exam.    CC:  Annual Gynecologic Examination    HPI: Raulito Hayes is a 29 y.o.  who presents for annual gynecologic examination. She has the following concerns:  No gyn concerns. Present with son who is experiencing stranger anxiety. Health Maintenance:    Patient reports her health to be excellent. She does not have weight concerns. She exercises 5 days per week with walking x 40 minutes. She does wear her seatbelt routinely. She does perform regular monthly self breast exams. She does feels safe at home. Patients does not follow a special diet. She gets 1 servings of dairy or calcium rich foods a day.     Patient Active Problem List   Diagnosis   • Anxiety   • Delivery of pregnancy by  section   • Chronic hypertension with superimposed preeclampsia   • Pregnancy resulting from in vitro fertilization, third trimester   • Insulin controlled gestational diabetes mellitus (GDM) in third trimester       Past Medical History:   Diagnosis Date   • Abnormal Pap smear of cervix    • Chronic hypertension affecting pregnancy    • Female infertility    • Gestational diabetes    • HPV in female    • Kidney stone    • Varicella        Past Surgical History:   Procedure Laterality Date   •  SECTION     • COLPOSCOPY     • EXPLORATORY LAPAROTOMY     • HYSTEROSCOPY W/ POLYPECTOMY     • KIDNEY STONE SURGERY     • [place]  DELIVERY ONLY N/A 2022    Procedure:  SECTION (); Surgeon: Ale Harris MD;  Location: [facility];  Service: Obstetrics   • TONSILLECTOMY     • WISDOM TOOTH EXTRACTION         Past OB/Gyn History:  Pt does not have menstrual issues. History of sexually transmitted infection: No.  History of abnormal pap smears: No.  Pap  normal.  Patient is not currently sexually active. heterosexual.   The current method of family planning is none.     Family History   Problem Relation Age of Onset   • COPD Mother    • Lymphoma Mother         non- hodgkin's    • Diabetes Mother    • Hypertension Mother    • No Known Problems Father    • No Known Problems Sister    • No Known Problems Sister    • No Known Problems Brother    • No Known Problems Brother    • Ovarian cancer Maternal Grandmother         diagnosed in 42'4   • Breast cancer Maternal Grandmother         diagnosed in 42's   • No Known Problems Maternal Grandfather    • Breast cancer Paternal Grandmother         diagnosed in late 52's   • Osteoporosis Paternal Grandmother    • Hypertension Paternal Grandmother    • No Known Problems Paternal Grandfather    • Cervical cancer Maternal Aunt    • Alzheimer's disease Family    • Colon cancer Neg Hx        Social History:  Social History     Socioeconomic History   • Marital status: /Civil Union     Spouse name: Not on file   • Number of children: Not on file   • Years of education: Not on file   • Highest education level: Not on file   Occupational History   • Not on file   Tobacco Use   • Smoking status: Never   • Smokeless tobacco: Never   Vaping Use   • Vaping Use: Never used   Substance and Sexual Activity   • Alcohol use: Not Currently     Comment: occasionally prior to pregnancy only   • Drug use: Never   • Sexual activity: Not Currently     Partners: Male   Other Topics Concern   • Not on file   Social History Narrative   • Not on file     Social Determinants of Health     Financial Resource Strain: Not on file   Food Insecurity: Not on file   Transportation Needs: Not on file   Physical Activity: Not on file   Stress: Not on file   Social Connections: Not on file   Intimate Partner Violence: Not on file   Housing Stability: Not on file     Presently lives with son. Patient is currently employed [employer]. Allergies   Allergen Reactions   • Sulfamethoxazole-Trimethoprim Rash     No current outpatient medications on file. Review of Systems  Constitutional :no fever, feels well, no tiredness, no recent weight gain or loss  ENT: no ear ache, no loss of hearing, no nosebleeds or nasal discharge, no sore throat or hoarseness. Cardiovascular: no complaints of slow or fast heart beat, no chest pain, no palpitations, no leg claudication or lower extremity edema. Respiratory: no complaints of shortness of shortness of breath, no SAGASTUME  Breasts:no complaints of breast pain, breast lump, or nipple discharge  Gastrointestinal: no complaints of abdominal pain, constipation, nausea, vomiting, or diarrhea or bloody stools  Genitourinary : no complaints of dysuria, incontinence, pelvic pain, no dysmenorrhea, vaginal discharge or abnormal vaginal bleeding and as noted in HPI. Musculoskeletal: no complaints of arthralgia, no myalgia, no joint swelling or stiffness, no limb pain or swelling. Integumentary: no complaints of skin rash or lesion, itching or dry skin  Neurological: no complaints of headache, no confusion, no numbness or tingling, no dizziness or fainting    Physical Exam:   /68   Ht 5' 4" (1.626 m)   Wt 59.6 kg (131 lb 6.4 oz)   LMP 07/06/2023 (Exact Date)   Breastfeeding No   BMI 22.55 kg/m²     General: appears stated age, cooperative, alert normal mood and affect   Psychiatric oriented to person, place and time. Mood and affect normal   Neck: normal, supple,trachea midline, no masses.   Thyroid: normal, no thyromegaly   Heart: regular rate and rhythm, S1, S2 normal, no murmur, click, rub or gallop   Lungs: clear to auscultation bilaterally, no increased work of breathing or signs of respiratory distress   Breasts: normal, no dimpling or skin changes noted   Abdomen: soft, non-tender, without masses or organomegaly   Vulva: normal , no lesions   Vagina: normal , no lesions or dryness   Urethra: normal   Urethal meatus normal   Bladder Normal, soft, non-tender and no prolapse or masses appreciated   Cervix: normal, no palpable masses    Uterus: normal , non-tender, not enlarged, no palpable masses   Adnexa: normal, non-tender without fullness or masses   Lymphatic Palpation of lymph nodes in neck, axilla, groin and/or other locations: no lymphadenopathy or masses noted   Skin Normal skin turgor and no rashes.   Palpation of skin and subcutaneous tissue normal.

## 2024-01-19 ENCOUNTER — OFFICE VISIT (OUTPATIENT)
Dept: FAMILY MEDICINE CLINIC | Facility: CLINIC | Age: 34
End: 2024-01-19
Payer: COMMERCIAL

## 2024-01-19 VITALS
DIASTOLIC BLOOD PRESSURE: 80 MMHG | WEIGHT: 136.4 LBS | HEIGHT: 64 IN | HEART RATE: 98 BPM | BODY MASS INDEX: 23.29 KG/M2 | SYSTOLIC BLOOD PRESSURE: 110 MMHG | OXYGEN SATURATION: 98 % | TEMPERATURE: 97.7 F

## 2024-01-19 DIAGNOSIS — L81.9 PIGMENTED SKIN LESION: ICD-10-CM

## 2024-01-19 DIAGNOSIS — F41.9 ANXIETY: Primary | ICD-10-CM

## 2024-01-19 DIAGNOSIS — R05.2 SUBACUTE COUGH: ICD-10-CM

## 2024-01-19 PROCEDURE — 99214 OFFICE O/P EST MOD 30 MIN: CPT | Performed by: FAMILY MEDICINE

## 2024-01-19 RX ORDER — SERTRALINE HYDROCHLORIDE 25 MG/1
25 TABLET, FILM COATED ORAL DAILY
Qty: 30 TABLET | Refills: 5 | Status: SHIPPED | OUTPATIENT
Start: 2024-01-19 | End: 2024-07-17

## 2024-01-19 RX ORDER — FLUTICASONE PROPIONATE AND SALMETEROL 100; 50 UG/1; UG/1
1 POWDER RESPIRATORY (INHALATION) 2 TIMES DAILY
Qty: 60 BLISTER | Refills: 0 | Status: SHIPPED | OUTPATIENT
Start: 2024-01-19

## 2024-01-19 RX ORDER — BENZONATATE 100 MG/1
100 CAPSULE ORAL 3 TIMES DAILY PRN
Qty: 20 CAPSULE | Refills: 0 | Status: SHIPPED | OUTPATIENT
Start: 2024-01-19

## 2024-01-22 NOTE — PROGRESS NOTES
Assessment/Plan:    No problem-specific Assessment & Plan notes found for this encounter.       Diagnoses and all orders for this visit:    Anxiety  -     sertraline (ZOLOFT) 25 mg tablet; Take 1 tablet (25 mg total) by mouth daily    Subacute cough  -     Fluticasone-Salmeterol (Advair Diskus) 100-50 mcg/dose inhaler; Inhale 1 puff 2 (two) times a day Rinse mouth after use.  -     benzonatate (TESSALON PERLES) 100 mg capsule; Take 1 capsule (100 mg total) by mouth 3 (three) times a day as needed for cough    Pigmented skin lesion  -     Ambulatory Referral to Dermatology; Future          Subjective:      Patient ID: Min Medina is a 33 y.o. female.    She has anxiety > depression.  She is doing talk therapy but feels that she could also benefit from medication.  She has no HI/SI.    She has a non-productive cough without F/C since .  She had COVID at that time.  She has no asthma history.    She has a small lesion on her ear.  It does not itch or bleed.  She has no family h/o skin cancer.    Depression        The following portions of the patient's history were reviewed and updated as appropriate: She  has a past medical history of Abnormal Pap smear of cervix, Chronic hypertension affecting pregnancy, Female infertility, Gestational diabetes, HPV in female, Kidney stone, and Varicella.  She   Patient Active Problem List    Diagnosis Date Noted    Insulin controlled gestational diabetes mellitus (GDM) in third trimester 2022    Pregnancy resulting from in vitro fertilization, third trimester 2022    Chronic hypertension with superimposed preeclampsia 2021    Delivery of pregnancy by  section 2021    Anxiety 2020     She  has a past surgical history that includes [place] tooth extraction; Tonsillectomy; Kidney stone surgery; Exploratory laparotomy; Hysteroscopy w/ polypectomy; Colposcopy; pr  delivery only (N/A, 2022); and  section.  Her  "family history includes Alzheimer's disease in her family; Breast cancer in her maternal grandmother and paternal grandmother; COPD in her mother; Cervical cancer in her maternal aunt; Diabetes in her mother; Hypertension in her mother and paternal grandmother; Lymphoma in her mother; No Known Problems in her brother, brother, father, maternal grandfather, paternal grandfather, sister, and sister; Osteoporosis in her paternal grandmother; Ovarian cancer in her maternal grandmother.  She  reports that she has never smoked. She has never used smokeless tobacco. She reports that she does not currently use alcohol. She reports that she does not use drugs.  Current Outpatient Medications   Medication Sig Dispense Refill    benzonatate (TESSALON PERLES) 100 mg capsule Take 1 capsule (100 mg total) by mouth 3 (three) times a day as needed for cough 20 capsule 0    Fluticasone-Salmeterol (Advair Diskus) 100-50 mcg/dose inhaler Inhale 1 puff 2 (two) times a day Rinse mouth after use. 60 blister 0    sertraline (ZOLOFT) 25 mg tablet Take 1 tablet (25 mg total) by mouth daily 30 tablet 5     No current facility-administered medications for this visit.     No current outpatient medications on file prior to visit.     No current facility-administered medications on file prior to visit.     She is allergic to sulfamethoxazole-trimethoprim..    Review of Systems   Psychiatric/Behavioral:  Positive for depression.    All other systems reviewed and are negative.        Objective:      /80 (BP Location: Right arm, Patient Position: Sitting)   Pulse 98   Temp 97.7 °F (36.5 °C) (Tympanic)   Ht 5' 4\" (1.626 m)   Wt 61.9 kg (136 lb 6.4 oz)   SpO2 98%   BMI 23.41 kg/m²          Physical Exam  Vitals and nursing note reviewed.   Constitutional:       Appearance: Normal appearance. She is normal weight.   Cardiovascular:      Rate and Rhythm: Normal rate and regular rhythm.      Pulses: Normal pulses.      Heart sounds: Normal " heart sounds.   Pulmonary:      Effort: Pulmonary effort is normal.      Breath sounds: Normal breath sounds.   Abdominal:      General: Abdomen is flat. Bowel sounds are normal.      Palpations: Abdomen is soft.   Musculoskeletal:         General: Normal range of motion.      Cervical back: Normal range of motion and neck supple.   Skin:     General: Skin is warm and dry.      Capillary Refill: Capillary refill takes less than 2 seconds.      Comments: 3 mm round, flesh-colored, raised area without teleangiectasia.   Neurological:      General: No focal deficit present.      Mental Status: She is alert and oriented to person, place, and time.   Psychiatric:         Mood and Affect: Mood normal.         Behavior: Behavior normal.         Thought Content: Thought content normal.         Judgment: Judgment normal.

## 2024-01-30 ENCOUNTER — APPOINTMENT (RX ONLY)
Dept: URBAN - NONMETROPOLITAN AREA CLINIC 4 | Facility: CLINIC | Age: 34
Setting detail: DERMATOLOGY
End: 2024-01-30

## 2024-01-30 DIAGNOSIS — D22 MELANOCYTIC NEVI: ICD-10-CM

## 2024-01-30 PROBLEM — D22.22 MELANOCYTIC NEVI OF LEFT EAR AND EXTERNAL AURICULAR CANAL: Status: ACTIVE | Noted: 2024-01-30

## 2024-01-30 PROCEDURE — ? PHOTO-DOCUMENTATION

## 2024-01-30 PROCEDURE — ? OBSERVATION AND MEASURE

## 2024-01-30 PROCEDURE — ? COUNSELING

## 2024-01-30 PROCEDURE — 99202 OFFICE O/P NEW SF 15 MIN: CPT

## 2024-01-30 ASSESSMENT — LOCATION DETAILED DESCRIPTION DERM: LOCATION DETAILED: LEFT TRAGUS

## 2024-01-30 ASSESSMENT — LOCATION ZONE DERM: LOCATION ZONE: EAR

## 2024-01-30 ASSESSMENT — LOCATION SIMPLE DESCRIPTION DERM: LOCATION SIMPLE: LEFT EAR

## 2024-01-30 NOTE — PROCEDURE: OBSERVATION
Body Location Override (Optional - Billing Will Still Be Based On Selected Body Map Location If Applicable): Left tragus
Detail Level: Detailed
Size Of Lesion: 0.4 cm x 0.4 cm

## 2024-01-31 DIAGNOSIS — L81.9 PIGMENTED SKIN LESION: Primary | ICD-10-CM

## 2024-03-13 ENCOUNTER — TELEPHONE (OUTPATIENT)
Dept: DERMATOLOGY | Facility: CLINIC | Age: 34
End: 2024-03-13

## 2024-03-19 DIAGNOSIS — F41.9 ANXIETY: Primary | ICD-10-CM

## 2024-04-23 ENCOUNTER — OFFICE VISIT (OUTPATIENT)
Dept: OBGYN CLINIC | Facility: CLINIC | Age: 34
End: 2024-04-23
Payer: COMMERCIAL

## 2024-04-23 VITALS
BODY MASS INDEX: 24.07 KG/M2 | HEIGHT: 64 IN | WEIGHT: 141 LBS | DIASTOLIC BLOOD PRESSURE: 84 MMHG | SYSTOLIC BLOOD PRESSURE: 120 MMHG

## 2024-04-23 DIAGNOSIS — N64.4 BREAST PAIN, RIGHT: Primary | ICD-10-CM

## 2024-04-23 PROCEDURE — 99213 OFFICE O/P EST LOW 20 MIN: CPT | Performed by: OBSTETRICS & GYNECOLOGY

## 2024-04-23 NOTE — PATIENT INSTRUCTIONS
Fibrocystic Breast Changes   WHAT YOU NEED TO KNOW:   What are fibrocystic breast changes?  Fibrocystic changes are changes in your breast tissue. Your breast tissue may have small cysts, benign lumps (not cancer), or thickened areas. These breast tissue changes are common in women who have not gone through menopause. Fibrocystic breast changes do not increase your risk of breast cancer. The cause of fibrocystic breast changes is unknown. They may be related to hormonal changes that occur during your menstrual cycle.  What are other signs and symptoms of fibrocystic breast changes?  Signs and symptoms may be more noticeable before your period. You may have any of the following:  Tenderness and pain in the upper outer areas of both breasts    Cysts that get larger and more painful before your period    Breast swelling during your period    Clear or cloudy nipple discharge    How are fibrocystic breast changes diagnosed?  Your healthcare provider will examine your breasts. He or she will ask about your signs and symptoms, and about your family medical history. The provider may diagnose fibrocystic breast changes based on your signs and symptoms alone. He or she may also order certain tests to make sure you do not have breast cancer. You may need any of the following:  A mammogram  is an x-ray to closely examine your breast tissue. Healthcare providers will also look for other lumps or tissue changes in your breast.         An ultrasound  uses sound waves to look for a cyst or tumor.    Aspiration and fine needle biopsy  is a procedure to find the cause of a breast lump. This procedure uses a small needle to collect fluid or cells from your breast. The samples are then sent to a lab. The drainage of fluid may also be done to help relieve pain.       How are fibrocystic breast changes treated?  Your healthcare provider may recommend the following to relieve your symptoms:  NSAIDs , such as ibuprofen, help decrease  swelling, pain, and fever. This medicine is available with or without a doctor's order. NSAIDs can cause stomach bleeding or kidney problems in certain people. If you take blood thinner medicine, always ask your healthcare provider if NSAIDs are safe for you. Always read the medicine label and follow directions.    Oral contraceptives  (birth control pills) may be recommended by your healthcare provider. These may help to decrease the level of hormones that worsen your signs and symptoms.    Surgery  to remove a large lump or cysts that return after drainage may be done.    How can I manage my symptoms?   Apply heat  on your breasts for 20 to 30 minutes every 2 hours for as many days as directed. Heat helps decrease pain and muscle spasms.    Apply ice  on your breasts for 15 to 20 minutes every hour or as directed. Use an ice pack, or put crushed ice in a plastic bag. Cover it with a towel. Ice helps prevent tissue damage and decreases swelling and pain.    Wear a well-fitted, supportive bra.  It may help to relieve pain and swelling.    Limit or avoid caffeine.  This may help to decrease symptoms in some women. Caffeine is found in coffee, tea, sodas, and chocolate.    Why is it important to continue breast self-exams?  Check your breast for changes in size, shape, or feel of the breast tissue. Check under your arms and all around your breasts. If you have monthly periods, examine your breasts after your period is over. Contact your healthcare provider if you notice any changes in your breasts. If you have questions, ask for more information about how to do a breast self-exam.       When should I contact my healthcare provider?   You notice other changes in your breasts or nipples, such as dimpling or a rash.    You have bloody nipple discharge.    You have a fever.    You have questions or concerns about your condition or care.    CARE AGREEMENT:   You have the right to help plan your care. Learn about your  health condition and how it may be treated. Discuss treatment options with your healthcare providers to decide what care you want to receive. You always have the right to refuse treatment. The above information is an  only. It is not intended as medical advice for individual conditions or treatments. Talk to your doctor, nurse or pharmacist before following any medical regimen to see if it is safe and effective for you.  © Copyright [employer] 2023 Information is for End User's use only and may not be sold, redistributed or otherwise used for commercial purposes.

## 2024-04-23 NOTE — PROGRESS NOTES
Assessment:   Min was seen today for breast pain.    Diagnoses and all orders for this visit:    Breast pain, right  -     US breast right limited (diagnostic); Future        Plan:  Right breast ultrasound ordered.  Pt given information on fibrocystic changes.  Also discussed vitamin supplementation such as vitamin E which may help with her breast discomfort.  Will contact patient with all results.    Subjective:    Patient is a 33 y.o. y.o.  female who presents for a problem visit.  Pt is c/o + breast pain and - breast lump.  Sx's are in the right side.  Sx's started 2 months ago.  Pain described as a soreness.  Patient reports that sx's are related to her menses.  Pain would go away for a couple of days and then come back.  Started around time pt started zoloft.  Pt reports - changes to her diet.  She drinks 2 caffeinated products daily.  Pt reports - breast trauma.  Pt reports - changes in diet and activity.  Pt reports - nipple discharge.   also stopped underwire bras and sx's have not improved.    Patient Active Problem List   Diagnosis    Anxiety    Delivery of pregnancy by  section    Chronic hypertension with superimposed preeclampsia    Pregnancy resulting from in vitro fertilization, third trimester    Insulin controlled gestational diabetes mellitus (GDM) in third trimester       Past Medical History:   Diagnosis Date    Abnormal Pap smear of cervix     Chronic hypertension affecting pregnancy     Female infertility     Gestational diabetes     HPV in female     Kidney stone     Varicella        Past Surgical History:   Procedure Laterality Date     SECTION      COLPOSCOPY      EXPLORATORY LAPAROTOMY      HYSTEROSCOPY W/ POLYPECTOMY      KIDNEY STONE SURGERY      [place]  DELIVERY ONLY N/A 2022    Procedure:  SECTION ();  Surgeon: Bola Cummings MD;  [facility];  Service: Obstetrics    TONSILLECTOMY      WISDOM TOOTH EXTRACTION         Family History    Problem Relation Age of Onset    COPD Mother     Lymphoma Mother         non- hodgkin's     Diabetes Mother     Hypertension Mother     No Known Problems Father     No Known Problems Sister     No Known Problems Sister     No Known Problems Brother     No Known Problems Brother     Ovarian cancer Maternal Grandmother         diagnosed in 40'2    Breast cancer Maternal Grandmother         diagnosed in 40's    No Known Problems Maternal Grandfather     Breast cancer Paternal Grandmother         diagnosed in late 50's    Osteoporosis Paternal Grandmother     Hypertension Paternal Grandmother     No Known Problems Paternal Grandfather     Cervical cancer Maternal Aunt     Alzheimer's disease Family     Colon cancer Neg Hx        Social History     Socioeconomic History    Marital status: /Civil Union     Spouse name: Not on file    Number of children: Not on file    Years of education: Not on file    Highest education level: Not on file   Occupational History    Not on file   Tobacco Use    Smoking status: Never    Smokeless tobacco: Never   Vaping Use    Vaping status: Never Used   Substance and Sexual Activity    Alcohol use: Not Currently     Comment: occasionally prior to pregnancy only    Drug use: Never    Sexual activity: Not Currently     Partners: Male   Other Topics Concern    Not on file   Social History Narrative    Not on file     Social Determinants of Health     Financial Resource Strain: Not on file   Food Insecurity: Not on file   Transportation Needs: Not on file   Physical Activity: Not on file   Stress: Not on file   Social Connections: Not on file   Intimate Partner Violence: Not on file   Housing Stability: Not on file         Current Outpatient Medications:     sertraline (Zoloft) 50 mg tablet, Take 1 tablet (50 mg total) by mouth daily, Disp: 90 tablet, Rfl: 3    Allergies   Allergen Reactions    Sulfamethoxazole-Trimethoprim Rash       Review of Systems  Constitutional :no fever, feels  well, no tiredness, no recent weight gain or loss  ENT: no ear ache, no loss of hearing, no nosebleeds or nasal discharge, no sore throat or hoarseness.  Cardiovascular: no complaints of slow or fast heart beat, no chest pain, no palpitations, no leg claudication or lower extremity edema.  Respiratory: no complaints of shortness of shortness of breath, no SAGASTUME  Breasts:no complaints of breast pain, breast lump, or nipple discharge  Gastrointestinal: no complaints of abdominal pain, constipation, nausea, vomiting, or diarrhea or bloody stools  Genitourinary : no complaints of dysuria, incontinence, pelvic pain, no dysmenorrhea, vaginal discharge or abnormal vaginal bleeding and as noted in HPI.       Musculoskeletal: no complaints of arthralgia, no myalgia, no joint swelling or            stiffness, no limb pain or swelling.  Integumentary: no complaints of skin rash or lesion, itching or dry skin  Neurological: no complaints of headache, no confusion, no numbness or tingling, no dizziness or fainting    Constitutional   General appearance: No acute distress, well appearing and well nourished.      Psychiatric   Orientation to person, place, and time: Normal.    Mood and affect: Normal.    Breast  Breasts: right breast normal without mass, skin or nipple changes or axillary nodes, pt reports pain from 2-5 o'clock position, sm to mod f-c changes throughout, left breast normal without mass, skin or nipple changes or axillary nodes; sm to mod f-c changes throughout

## 2024-05-06 ENCOUNTER — CONSULT (OUTPATIENT)
Dept: DERMATOLOGY | Facility: CLINIC | Age: 34
End: 2024-05-06
Payer: COMMERCIAL

## 2024-05-06 VITALS — BODY MASS INDEX: 24.07 KG/M2 | HEIGHT: 64 IN | WEIGHT: 141 LBS

## 2024-05-06 DIAGNOSIS — D48.5 NEOPLASM OF UNCERTAIN BEHAVIOR OF SKIN: Primary | ICD-10-CM

## 2024-05-06 DIAGNOSIS — L81.9 PIGMENTED SKIN LESION: ICD-10-CM

## 2024-05-06 PROCEDURE — 11102 TANGNTL BX SKIN SINGLE LES: CPT | Performed by: STUDENT IN AN ORGANIZED HEALTH CARE EDUCATION/TRAINING PROGRAM

## 2024-05-06 PROCEDURE — 88342 IMHCHEM/IMCYTCHM 1ST ANTB: CPT | Performed by: DERMATOLOGY

## 2024-05-06 PROCEDURE — 99244 OFF/OP CNSLTJ NEW/EST MOD 40: CPT | Performed by: STUDENT IN AN ORGANIZED HEALTH CARE EDUCATION/TRAINING PROGRAM

## 2024-05-06 PROCEDURE — 88305 TISSUE EXAM BY PATHOLOGIST: CPT | Performed by: DERMATOLOGY

## 2024-05-06 NOTE — PROGRESS NOTES
"[facility] Note     Patient Name: Min Medina  Encounter Date: 05/06/24     Have you been cared for by a [facility] Dermatologist in the last 3 years and, if so, which description applies to you?    NO.   I am considered a \"new\" patient and must complete all patient intake questions. I am FEMALE/of child-bearing potential.    REVIEW OF SYSTEMS:  Have you recently had or currently have any of the following? Recent fever or chills? No  Any non-healing wound? No  Are you pregnant or planning to become pregnant? No  Are you currently or planning to be nursing or breast feeding? No   PAST MEDICAL HISTORY:  Have you personally ever had or currently have any of the following?  If \"YES,\" then please provide more detail. Skin cancer (such as Melanoma, Basal Cell Carcinoma, Squamous Cell Carcinoma?  No  Tuberculosis, HIV/AIDS, Hepatitis B or C: No  Radiation Treatment No   HISTORY OF IMMUNOSUPPRESSION:   Do you have a history of any of the following:  Systemic Immunosuppression such as Diabetes, Biologic or Immunotherapy, Chemotherapy, Organ Transplantation, Bone Marrow Transplantation?  No    Answering \"YES\" requires the addition of the dotphrase \"IMMUNOSUPPRESSED\" as the first diagnosis of the patient's visit.   FAMILY HISTORY:  Any \"first degree relatives\" (parent, brother, sister, or child) with the following?    Skin Cancer, Pancreatic or Other Cancer? No   PATIENT EXPERIENCE:    Do you want the Dermatologist to perform a COMPLETE skin exam today including a clinical examination under the \"bra and underwear\" areas?  NO  If necessary, do we have your permission to call and leave a detailed message on your Preferred Phone number that includes your specific medical information?  Yes      Allergies   Allergen Reactions    Sulfamethoxazole-Trimethoprim Rash      Current Outpatient Medications:     sertraline (Zoloft) 50 mg tablet, Take 1 tablet (50 mg total) by mouth daily, Disp: 90 tablet, Rfl: 3    " "      Whom besides the patient is providing clinical information about today's encounter?   NO ADDITIONAL HISTORIAN (patient alone provided history)    Physical Exam and Assessment/Plan by Diagnosis:    NEOPLASM OF UNCERTAIN BEHAVIOR OF SKIN    Physical Exam:  (Anatomic Location); (Size and Morphological Description); (Differential Diagnosis):  Specimen A;left preauricular region;0.5 cm x 0.4 cm skin colored papule with surrounding rim of hyperpigmentation;Ddx: rule out atypia   Pertinent Positives:  Pertinent Negatives:    Additional History of Present Condition:   Patient here today for spot on left ear ,been present for 3 months, denies bleeding,scabbing or itching        Assessment and Plan:  I have discussed with the patient that a sample of skin via a \"skin biopsy” would be potentially helpful to further make a specific diagnosis under the microscope.  Based on a thorough discussion of this condition and the management approach to it (including a comprehensive discussion of the known risks, side effects and potential benefits of treatment), the patient (family) agrees to implement the following specific plan:    Procedure:  Skin Biopsy.  After a thorough discussion of treatment options and risk/benefits/alternatives (including but not limited to local pain, scarring, dyspigmentation, blistering, possible superinfection, and inability to confirm a diagnosis via histopathology), verbal and written consent were obtained and portion of the rash was biopsied for tissue sample.  See below for consent that was obtained from patient and subsequent Procedure Note.   PROCEDURE TANGENTIAL (SHAVE) BIOPSY NOTE:    Performing Physician:   Anatomic Location; Clinical Description with size (cm); Pre-Op Diagnosis:   Specimen A;left preauricular region;0.5 cm x 0.4 cm skin colored papule with surrounding rim of hyperpigmentation;Ddx: rule out atypia     Post-op diagnosis: Same     Local anesthesia: 1% xylocaine with " "epi      Topical anesthesia: None    Hemostasis: Aluminum chloride       After obtaining informed consent  at which time there was a discussion about the purpose of biopsy  and low risks of infection and bleeding.  The area was prepped and draped in the usual fashion. Anesthesia was obtained with 1% lidocaine with epinephrine. A shave biopsy to an appropriate sampling depth was obtained by Shave (Dermablade or 15 blade) The resulting wound was covered with surgical ointment and bandaged appropriately.     The patient tolerated the procedure well without complications and was without signs of functional compromise.      Specimen has been sent for review by Dermatopathology.    Standard post-procedure care has been explained and has been included in written form within the patient's copy of Informed Consent.    INFORMED CONSENT DISCUSSION AND POST-OPERATIVE INSTRUCTIONS FOR PATIENT    I.  RATIONALE FOR PROCEDURE  I understand that a skin biopsy allows the Dermatologist to test a lesion or rash under the microscope to obtain a diagnosis.  It usually involves numbing the area with numbing medication and removing a small piece of skin; sometimes the area will be closed with sutures. In this specific procedure, sutures are not usually needed.  If any sutures are placed, then they are usually need to be removed in 2 weeks or less.    I understand that my Dermatologist recommends that a skin \"shave\" biopsy be performed today.  A local anesthetic, similar to the kind that a dentist uses when filling a cavity, will be injected with a very small needle into the skin area to be sampled.  The injected skin and tissue underneath \"will go to sleep” and become numb so no pain should be felt afterwards.  An instrument shaped like a tiny \"razor blade\" (shave biopsy instrument) will be used to cut a small piece of tissue and skin from the area so that a sample of tissue can be taken and examined more closely under the microscope.  A " "slight amount of bleeding will occur, but it will be stopped with direct pressure and a pressure bandage and any other appropriate methods.  I understands that a scar will form where the wound was created.  Surgical ointment will be applied to help protect the wound.  Sutures are not usually needed.    II.  RISKS AND POTENTIAL COMPLICATIONS   I understand the risks and potential complications of a skin biopsy include but are not limited to the following:  Bleeding  Infection  Pain  Scar/keloid  Skin discoloration  Incomplete Removal  Recurrence  Nerve Damage/Numbness/Loss of Function  Allergic Reaction to Anesthesia  Biopsies are diagnostic procedures and based on findings additional treatment or evaluation may be required  Loss or destruction of specimen resulting in no additional findings    My Dermatologist has explained to me the nature of the condition, the nature of the procedure, and the benefits to be reasonably expected compared with alternative approaches.  My Dermatologist has discussed the likelihood of major risks or complications of this procedure including the specific risks listed above, such as bleeding, infection, and scarring/keloid.  I understand that a scar is expected after this procedure.  I understand that my physician cannot predict if the scar will form a \"keloid,\" which extends beyond the borders of the wound that is created.  A keloid is a thick, painful, and bumpy scar.  A keloid can be difficult to treat, as it does not always respond well to therapy, which includes injecting cortisone directly into the keloid every few weeks.  While this usually reduces the pain and size of the scar, it does not eliminate it.      I understand that photographs may be taken before and after the procedure.  These will be maintained as part of the medical providers confidential records and may not be made available to me.  I further authorize the medical provider to use the photographs for teaching " "purposes or to illustrate scientific papers, books, or lectures if in his/her judgment, medical research, education, or science may benefit from its use.    I have had an opportunity to fully inquire about the risks and benefits of this procedure and its alternatives.   I have been given ample time and opportunity to ask questions and to seek a second opinion if I wished to do so.  I acknowledge that there have specifically been no guarantees as to the cosmetic results from the procedure.  I am aware that with any procedure there is always the possibility of an unexpected complication.    III. POST-PROCEDURAL CARE (WHAT YOU WILL NEED TO DO \"AFTER THE BIOPSY\" TO OPTIMIZE HEALING)    Keep the area clean and dry.  Try NOT to remove the bandage or get it wet for the first 24 hours.    Gently clean the area and apply surgical ointment (such as Vaseline petrolatum ointment, which is available \"over the counter\" and not a prescription) to the biopsy site for up to 2 weeks straight.  This acts to protect the wound from the outside world.      Sutures are not usually placed in this procedure.  If any sutures were placed, return for suture removal as instructed (generally 1 week for the face, 2 weeks for the body).      Take Acetaminophen (Tylenol) for discomfort, if no contraindications.  Ibuprofen or aspirin could make bleeding worse.    Call our office immediately for signs of infection: fever, chills, increased redness, warmth, tenderness, discomfort/pain, or pus or foul smell coming from the wound.    WHAT TO DO IF THERE IS ANY BLEEDING?  If a small amount of bleeding is noticed, place a clean cloth over the area and apply firm pressure for ten minutes.  Check the wound after 10 minutes of direct pressure.  If bleeding persists, try one more time for an additional 10 minutes of direct pressure on the area.  If the bleeding becomes heavier or does not stop after the second attempt, or if you have any other questions about " this procedure, then please call your [facility] Dermatologist by calling [phone] (SKIN).     I hereby acknowledge that I have reviewed and verified the site with my Dermatologist and have requested and authorized my Dermatologist to proceed with the procedure.             Scribe Attestation      I,:  Jaci Gan am acting as a scribe while in the presence of the attending physician.:       I,:  Patricio Andre MD personally performed the services described in this documentation    as scribed in my presence.:

## 2024-05-06 NOTE — PATIENT INSTRUCTIONS
"III. POST-PROCEDURAL CARE (WHAT YOU WILL NEED TO DO \"AFTER THE BIOPSY\" TO OPTIMIZE HEALING)    Keep the area clean and dry.  Try NOT to remove the bandage or get it wet for the first 24 hours.    Gently clean the area and apply surgical ointment (such as Vaseline petrolatum ointment, which is available \"over the counter\" and not a prescription) to the biopsy site for up to 2 weeks straight.  This acts to protect the wound from the outside world.      Sutures are not usually placed in this procedure.  If any sutures were placed, return for suture removal as instructed (generally 1 week for the face, 2 weeks for the body).      Take Acetaminophen (Tylenol) for discomfort, if no contraindications.  Ibuprofen or aspirin could make bleeding worse.    Call our office immediately for signs of infection: fever, chills, increased redness, warmth, tenderness, discomfort/pain, or pus or foul smell coming from the wound.    WHAT TO DO IF THERE IS ANY BLEEDING?  If a small amount of bleeding is noticed, place a clean cloth over the area and apply firm pressure for ten minutes.  Check the wound after 10 minutes of direct pressure.  If bleeding persists, try one more time for an additional 10 minutes of direct pressure on the area.  If the bleeding becomes heavier or does not stop after the second attempt, or if you have any other questions about this procedure, then please call your [facility]'s Dermatologist by calling [phone] (SKIN).     I hereby acknowledge that I have reviewed and verified the site with my Dermatologist and have requested and authorized my Dermatologist to proceed with the procedu  "

## 2024-05-17 PROCEDURE — 88342 IMHCHEM/IMCYTCHM 1ST ANTB: CPT | Performed by: DERMATOLOGY

## 2024-05-17 PROCEDURE — 88305 TISSUE EXAM BY PATHOLOGIST: CPT | Performed by: DERMATOLOGY

## 2024-05-20 ENCOUNTER — HOSPITAL ENCOUNTER (OUTPATIENT)
Dept: RADIOLOGY | Facility: CLINIC | Age: 34
Discharge: HOME | End: 2024-05-20
Payer: COMMERCIAL

## 2024-05-20 VITALS — WEIGHT: 144 LBS | BODY MASS INDEX: 20.16 KG/M2 | HEIGHT: 71 IN

## 2024-05-20 DIAGNOSIS — N64.4 BREAST PAIN, RIGHT: ICD-10-CM

## 2024-05-20 DIAGNOSIS — N64.4 BREAST PAIN: ICD-10-CM

## 2024-05-20 PROCEDURE — 77066 DX MAMMO INCL CAD BI: CPT

## 2024-05-20 PROCEDURE — G0279 TOMOSYNTHESIS, MAMMO: HCPCS

## 2024-05-20 PROCEDURE — 76642 ULTRASOUND BREAST LIMITED: CPT

## 2024-05-21 ENCOUNTER — TELEPHONE (OUTPATIENT)
Age: 34
End: 2024-05-21

## 2024-05-21 DIAGNOSIS — Z80.3 FAMILY HISTORY OF BREAST CANCER: Primary | ICD-10-CM

## 2024-05-21 NOTE — TELEPHONE ENCOUNTER
Spoke with patient to review recommendation per Dr. Baker for consult with breast surgeon due to family history and dense breast tissue. Patient verbalizes understanding and would like referral to breast surgeon.

## 2024-05-22 NOTE — TELEPHONE ENCOUNTER
Called patient. Patient aware referral placed and will call to schedule. No further questions at this time.

## 2024-08-08 ENCOUNTER — CONSULT (OUTPATIENT)
Dept: SURGICAL ONCOLOGY | Facility: CLINIC | Age: 34
End: 2024-08-08
Payer: COMMERCIAL

## 2024-08-08 VITALS
SYSTOLIC BLOOD PRESSURE: 118 MMHG | BODY MASS INDEX: 20.86 KG/M2 | OXYGEN SATURATION: 98 % | DIASTOLIC BLOOD PRESSURE: 72 MMHG | TEMPERATURE: 97.2 F | HEART RATE: 63 BPM | HEIGHT: 71 IN | WEIGHT: 149 LBS

## 2024-08-08 DIAGNOSIS — N64.4 BREAST PAIN: Primary | ICD-10-CM

## 2024-08-08 DIAGNOSIS — Z80.3 FAMILY HISTORY OF BREAST CANCER: ICD-10-CM

## 2024-08-08 PROCEDURE — 99244 OFF/OP CNSLTJ NEW/EST MOD 40: CPT

## 2024-08-08 NOTE — PROGRESS NOTES
Surgical Oncology Follow Up       [address] [facility] SURGICAL ONCOLOGY [place]  [address] [place] [address]    Min Medina  1990  [id number]  [address] [facility] SURGICAL ONCOLOGY [place]  [address] [place] [address]    Chief Complaint   Patient presents with   • Consult       Assessment/Plan:  1. Breast pain  - warm compress, supportive bra, EPO     2. Family history of breast cancer  - Ambulatory Referral to Oncology Genetics; Future      Discussion/Summary: Patient is a 34 year old female presenting today for breast pain and family hx of breast cancer. Her maternal grandmother has a hx of breast and ovarian cancer in her 50s and her paternal grandmother also has a hx of breast cancer in her 50s. She has a maternal aunt with a hx of cervical cancer in her 40s. I am going to refer her to genetics for counseling and possible genetic testing. She states that she began experiencing right sided breast pain in the upper inner quadrant when she began taking zoloft. She states that it started severe but is now manageable. She states it may have been more painful d/t trauma of her 2 year old accidentally elbowing her breast. She had a b/l dx mammo and u/s of the right breast which were BIRADS 1 category 4 density. I explained to her that her pain is most likely r/t her zoloft and due to fibrocystic changes secondary to her dense breast tissue. I am recommending warm compress, EPO, and to wear a supportive bra. There were no concerns on her cbe. I informed her that she may follow with us annually if she wants but she does not need to start breast imaging until 40s. Patient would like to follow with gyn for annual breast exam and she will contact our office with any concerns in the future. I think this is reasonable. I told her to be self breast aware and told her what to look for that is considered abnormal. She was instructed to call with  any questions or concerns prior to this time. All questions were answered today.      History of Present Illness:     Oncology History    No history exists.        -Interval History: Patient is a 34 year old female presenting today for breast pain and family hx of breast cancer. Her maternal grandmother has a hx of breast and ovarian cancer in her 50s and her paternal grandmother also has a hx of breast cancer in her 50s. She has a maternal aunt with a hx of cervical cancer in her 40s. She states that she began experiencing right sided breast pain in the upper inner quadrant when she began taking zoloft. She states that it started severe but is now manageable. She states it may have been more painful d/t trauma of her 2 year old accidentally elbowing her breast. She had a b/l dx mammo and u/s of the right breast which were BIRADS 1 category 4 density. Age of menarche was 15. She had 1 pregnancy and 1 live birth. She is pre-menopausal. She has a hx of IVF. She never had genetic testing and is not of Ashkenazi Ten[place] descent.     Review of Systems:  Review of Systems   Constitutional:  Positive for fatigue. Negative for activity change, appetite change and unexpected weight change.   Respiratory:  Negative for cough and shortness of breath.    Cardiovascular:  Negative for chest pain.   Gastrointestinal:  Negative for abdominal pain, diarrhea, nausea and vomiting.   Endocrine: Negative for heat intolerance.   Musculoskeletal:  Negative for arthralgias, back pain and myalgias.   Skin:  Negative for rash.   Neurological:  Negative for weakness and headaches.   Hematological:  Negative for adenopathy.       Patient Active Problem List   Diagnosis   • Anxiety   • Delivery of pregnancy by  section   • Chronic hypertension with superimposed preeclampsia   • Pregnancy resulting from in vitro fertilization, third trimester   • Insulin controlled gestational diabetes mellitus (GDM) in third trimester   • Breast pain    • Family history of breast cancer     Past Medical History:   Diagnosis Date   • Abnormal Pap smear of cervix    • Chronic hypertension affecting pregnancy    • Female infertility    • Gestational diabetes    • HPV in female    • Kidney stone    • Varicella      Past Surgical History:   Procedure Laterality Date   •  SECTION     • COLPOSCOPY     • EXPLORATORY LAPAROTOMY     • HYSTEROSCOPY W/ POLYPECTOMY     • KIDNEY STONE SURGERY     • [place]  DELIVERY ONLY N/A 2022    Procedure:  SECTION ();  Surgeon: Bola Cummings MD;  Location: [facility];  Service: Obstetrics   • TONSILLECTOMY     • WISDOM TOOTH EXTRACTION       Family History   Problem Relation Age of Onset   • COPD Mother    • Lymphoma Mother         non- hodgkin's    • Diabetes Mother    • Hypertension Mother    • No Known Problems Father    • No Known Problems Sister    • No Known Problems Sister    • Ovarian cancer Maternal Grandmother         diagnosed in 40'2   • Breast cancer Maternal Grandmother         diagnosed in 40's   • No Known Problems Maternal Grandfather    • Breast cancer Paternal Grandmother         diagnosed in late 50's   • Osteoporosis Paternal Grandmother    • Hypertension Paternal Grandmother    • No Known Problems Paternal Grandfather    • No Known Problems Brother    • No Known Problems Brother    • Cervical cancer Maternal Aunt 48   • Alzheimer's disease Family    • Colon cancer Neg Hx      Social History     Socioeconomic History   • Marital status: /Civil Union     Spouse name: Not on file   • Number of children: Not on file   • Years of education: Not on file   • Highest education level: Not on file   Occupational History   • Not on file   Tobacco Use   • Smoking status: Never   • Smokeless tobacco: Never   Vaping Use   • Vaping status: Never Used   Substance and Sexual Activity   • Alcohol use: Not Currently     Comment: occasionally prior to pregnancy only   • Drug use: Never   • Sexual  activity: Not Currently     Partners: Male   Other Topics Concern   • Not on file   Social History Narrative   • Not on file     Social Determinants of Health     Financial Resource Strain: Not on file   Food Insecurity: Not on file   Transportation Needs: Not on file   Physical Activity: Not on file   Stress: Not on file   Social Connections: Unknown (6/18/2024)    Received from [email or web address]    Social Connections    • How often do you feel lonely or isolated from those around you? (Adult - for ages 18 years and over): Not on file   Intimate Partner Violence: Not on file   Housing Stability: Not on file       Current Outpatient Medications:   •  sertraline (Zoloft) 50 mg tablet, Take 1 tablet (50 mg total) by mouth daily, Disp: 90 tablet, Rfl: 3  Allergies   Allergen Reactions   • Sulfamethoxazole-Trimethoprim Rash     Vitals:    08/08/24 1018   BP: 118/72   Pulse: 63   Temp: (!) 97.2 °F (36.2 °C)   SpO2: 98%       Physical Exam  Constitutional:       General: She is not in acute distress.     Appearance: Normal appearance.   Cardiovascular:      Rate and Rhythm: Normal rate and regular rhythm.      Pulses: Normal pulses.      Heart sounds: Normal heart sounds.   Pulmonary:      Effort: Pulmonary effort is normal.      Breath sounds: Normal breath sounds.   Chest:      Chest wall: No mass.   Breasts:     Right: No swelling, bleeding, inverted nipple, mass, nipple discharge, skin change or tenderness.      Left: No swelling, bleeding, inverted nipple, mass, nipple discharge, skin change or tenderness.   Abdominal:      General: Abdomen is flat.      Palpations: Abdomen is soft.   Lymphadenopathy:      Upper Body:      Right upper body: No supraclavicular, axillary or pectoral adenopathy.      Left upper body: No supraclavicular, axillary or pectoral adenopathy.   Skin:     General: Skin is warm.   Neurological:      General: No focal deficit present.      Mental Status: She is alert and oriented to person, place, and  time.   Psychiatric:         Mood and Affect: Mood normal.         Behavior: Behavior normal.           Results:    Imaging  No results found.    I reviewed the above imaging data.      Advance Care Planning/Advance Directives:  Discussed disease status, cancer treatment plans and/or cancer treatment goals with the patient.

## 2024-08-26 ENCOUNTER — APPOINTMENT (OUTPATIENT)
Dept: LAB | Facility: CLINIC | Age: 34
End: 2024-08-26

## 2024-08-26 DIAGNOSIS — Z00.8 HEALTH EXAMINATION IN POPULATION SURVEY: ICD-10-CM

## 2024-08-26 LAB
CHOLEST SERPL-MCNC: 190 MG/DL
EST. AVERAGE GLUCOSE BLD GHB EST-MCNC: 117 MG/DL
HBA1C MFR BLD: 5.7 %
HDLC SERPL-MCNC: 57 MG/DL
LDLC SERPL CALC-MCNC: 115 MG/DL (ref 0–100)
NONHDLC SERPL-MCNC: 133 MG/DL
TRIGL SERPL-MCNC: 88 MG/DL

## 2024-08-26 PROCEDURE — 36415 COLL VENOUS BLD VENIPUNCTURE: CPT

## 2024-08-26 PROCEDURE — 80061 LIPID PANEL: CPT

## 2024-08-26 PROCEDURE — 83036 HEMOGLOBIN GLYCOSYLATED A1C: CPT

## 2024-08-30 ENCOUNTER — HOSPITAL ENCOUNTER (EMERGENCY)
Facility: HOSPITAL | Age: 34
Discharge: HOME | End: 2024-08-30
Attending: STUDENT IN AN ORGANIZED HEALTH CARE EDUCATION/TRAINING PROGRAM
Payer: COMMERCIAL

## 2024-08-30 VITALS
TEMPERATURE: 97.2 F | HEART RATE: 61 BPM | DIASTOLIC BLOOD PRESSURE: 91 MMHG | SYSTOLIC BLOOD PRESSURE: 118 MMHG | OXYGEN SATURATION: 100 % | RESPIRATION RATE: 18 BRPM

## 2024-08-30 DIAGNOSIS — R55 NEAR SYNCOPE: Primary | ICD-10-CM

## 2024-08-30 DIAGNOSIS — R00.2 PALPITATIONS: ICD-10-CM

## 2024-08-30 LAB
ALBUMIN SERPL BCG-MCNC: 4.4 G/DL (ref 3.5–5)
ALP SERPL-CCNC: 69 U/L (ref 34–104)
ALT SERPL W P-5'-P-CCNC: 8 U/L (ref 7–52)
ANION GAP SERPL CALCULATED.3IONS-SCNC: 8 MMOL/L (ref 4–13)
AST SERPL W P-5'-P-CCNC: 12 U/L (ref 13–39)
ATRIAL RATE: 63 BPM
BASOPHILS # BLD AUTO: 0.02 THOUSANDS/ÂΜL (ref 0–0.1)
BASOPHILS NFR BLD AUTO: 0 % (ref 0–1)
BILIRUB SERPL-MCNC: 0.42 MG/DL (ref 0.2–1)
BUN SERPL-MCNC: 10 MG/DL (ref 5–25)
CALCIUM SERPL-MCNC: 9.6 MG/DL (ref 8.4–10.2)
CARDIAC TROPONIN I PNL SERPL HS: <2 NG/L
CHLORIDE SERPL-SCNC: 103 MMOL/L (ref 96–108)
CO2 SERPL-SCNC: 24 MMOL/L (ref 21–32)
CREAT SERPL-MCNC: 0.66 MG/DL (ref 0.6–1.3)
EOSINOPHIL # BLD AUTO: 0.06 THOUSAND/ÂΜL (ref 0–0.61)
EOSINOPHIL NFR BLD AUTO: 1 % (ref 0–6)
ERYTHROCYTE [DISTWIDTH] IN BLOOD BY AUTOMATED COUNT: 12.3 % (ref 11.6–15.1)
EXT PREGNANCY TEST URINE: NEGATIVE
EXT. CONTROL: NORMAL
GFR SERPL CREATININE-BSD FRML MDRD: 116 ML/MIN/1.73SQ M
GLUCOSE SERPL-MCNC: 106 MG/DL (ref 65–140)
HCT VFR BLD AUTO: 44.3 % (ref 34.8–46.1)
HGB BLD-MCNC: 14.6 G/DL (ref 11.5–15.4)
IMM GRANULOCYTES # BLD AUTO: 0.03 THOUSAND/UL (ref 0–0.2)
IMM GRANULOCYTES NFR BLD AUTO: 0 % (ref 0–2)
LYMPHOCYTES # BLD AUTO: 1.51 THOUSANDS/ÂΜL (ref 0.6–4.47)
LYMPHOCYTES NFR BLD AUTO: 20 % (ref 14–44)
MCH RBC QN AUTO: 30 PG (ref 26.8–34.3)
MCHC RBC AUTO-ENTMCNC: 33 G/DL (ref 31.4–37.4)
MCV RBC AUTO: 91 FL (ref 82–98)
MONOCYTES # BLD AUTO: 0.41 THOUSAND/ÂΜL (ref 0.17–1.22)
MONOCYTES NFR BLD AUTO: 5 % (ref 4–12)
NEUTROPHILS # BLD AUTO: 5.64 THOUSANDS/ÂΜL (ref 1.85–7.62)
NEUTS SEG NFR BLD AUTO: 74 % (ref 43–75)
NRBC BLD AUTO-RTO: 0 /100 WBCS
P AXIS: 48 DEGREES
PLATELET # BLD AUTO: 266 THOUSANDS/UL (ref 149–390)
PMV BLD AUTO: 9.5 FL (ref 8.9–12.7)
POTASSIUM SERPL-SCNC: 3.9 MMOL/L (ref 3.5–5.3)
PR INTERVAL: 146 MS
PROT SERPL-MCNC: 7.5 G/DL (ref 6.4–8.4)
QRS AXIS: 23 DEGREES
QRSD INTERVAL: 86 MS
QT INTERVAL: 416 MS
QTC INTERVAL: 425 MS
RBC # BLD AUTO: 4.87 MILLION/UL (ref 3.81–5.12)
SODIUM SERPL-SCNC: 135 MMOL/L (ref 135–147)
T WAVE AXIS: 35 DEGREES
VENTRICULAR RATE: 63 BPM
WBC # BLD AUTO: 7.67 THOUSAND/UL (ref 4.31–10.16)

## 2024-08-30 PROCEDURE — 93005 ELECTROCARDIOGRAM TRACING: CPT

## 2024-08-30 PROCEDURE — 93010 ELECTROCARDIOGRAM REPORT: CPT | Performed by: INTERNAL MEDICINE

## 2024-08-30 PROCEDURE — 99285 EMERGENCY DEPT VISIT HI MDM: CPT | Performed by: STUDENT IN AN ORGANIZED HEALTH CARE EDUCATION/TRAINING PROGRAM

## 2024-08-30 PROCEDURE — 36415 COLL VENOUS BLD VENIPUNCTURE: CPT

## 2024-08-30 PROCEDURE — 80053 COMPREHEN METABOLIC PANEL: CPT | Performed by: STUDENT IN AN ORGANIZED HEALTH CARE EDUCATION/TRAINING PROGRAM

## 2024-08-30 PROCEDURE — 84484 ASSAY OF TROPONIN QUANT: CPT | Performed by: STUDENT IN AN ORGANIZED HEALTH CARE EDUCATION/TRAINING PROGRAM

## 2024-08-30 PROCEDURE — 85025 COMPLETE CBC W/AUTO DIFF WBC: CPT | Performed by: STUDENT IN AN ORGANIZED HEALTH CARE EDUCATION/TRAINING PROGRAM

## 2024-08-30 PROCEDURE — 81025 URINE PREGNANCY TEST: CPT | Performed by: STUDENT IN AN ORGANIZED HEALTH CARE EDUCATION/TRAINING PROGRAM

## 2024-08-30 NOTE — ED PROVIDER NOTES
History  Chief Complaint   Patient presents with    Dizziness     Pt reports sudden on set of dizziness and nausea at 0845     34-year-old female.  Presents with lightheadedness, numbness/tingling of the upper extremities, palpitations.  She states that she developed the symptoms while sitting at her desk this morning.  She states that she almost passed out.  States that she may be dehydrated.  Did not eat breakfast this morning.  Patient states that her symptoms are improved.  Last menstrual period was 2 weeks ago.      History provided by:  Patient  Dizziness  Quality:  Lightheadedness  Severity:  Moderate  Onset quality:  Sudden  Timing:  Intermittent  Progression:  Partially resolved  Chronicity:  New  Associated symptoms: nausea and palpitations    Associated symptoms: no blood in stool, no chest pain, no diarrhea, no headaches, no shortness of breath, no syncope, no vomiting and no weakness      Prior to Admission Medications   Prescriptions Last Dose Informant Patient Reported? Taking?   sertraline (Zoloft) 50 mg tablet 2024  No Yes   Sig: Take 1 tablet (50 mg total) by mouth daily      Facility-Administered Medications: None       Past Medical History:   Diagnosis Date    Abnormal Pap smear of cervix     Chronic hypertension affecting pregnancy     Female infertility     Gestational diabetes     HPV in female     Kidney stone     Varicella        Past Surgical History:   Procedure Laterality Date     SECTION      COLPOSCOPY      EXPLORATORY LAPAROTOMY      HYSTEROSCOPY W/ POLYPECTOMY      KIDNEY STONE SURGERY      [place]  DELIVERY ONLY N/A 2022    Procedure:  SECTION ();  Surgeon: Bola Cummings MD;  Location: [facility];  Service: Obstetrics    TONSILLECTOMY      WISDOM TOOTH EXTRACTION         Family History   Problem Relation Age of Onset    COPD Mother     Lymphoma Mother         non- hodgkin's     Diabetes Mother     Hypertension Mother     No Known Problems Father      No Known Problems Sister     No Known Problems Sister     Ovarian cancer Maternal Grandmother         diagnosed in 40'2    Breast cancer Maternal Grandmother         diagnosed in 40's    No Known Problems Maternal Grandfather     Breast cancer Paternal Grandmother         diagnosed in late 50's    Osteoporosis Paternal Grandmother     Hypertension Paternal Grandmother     No Known Problems Paternal Grandfather     No Known Problems Brother     No Known Problems Brother     Cervical cancer Maternal Aunt 48    Alzheimer's disease Family     Colon cancer Neg Hx      I have reviewed and agree with the history as documented.    E-Cigarette/Vaping    E-Cigarette Use Never User      E-Cigarette/Vaping Substances    Nicotine No     THC No     CBD No     Flavoring No     Other No     Unknown No      Social History     Tobacco Use    Smoking status: Never    Smokeless tobacco: Never   Vaping Use    Vaping status: Never Used   Substance Use Topics    Alcohol use: Not Currently     Comment: occasionally prior to pregnancy only    Drug use: Never       Review of Systems   Constitutional:  Positive for activity change and appetite change.   HENT:  Negative for congestion, rhinorrhea, sinus pain and sore throat.    Respiratory:  Negative for chest tightness, shortness of breath and wheezing.    Cardiovascular:  Positive for palpitations. Negative for chest pain and syncope.   Gastrointestinal:  Positive for nausea. Negative for abdominal pain, blood in stool, diarrhea and vomiting.   Neurological:  Positive for dizziness, light-headedness and numbness. Negative for weakness and headaches.   All other systems reviewed and are negative.    Physical Exam  Physical Exam  Vitals and nursing note reviewed.   Constitutional:       General: She is not in acute distress.     Appearance: She is not ill-appearing or toxic-appearing.   HENT:      Head: Normocephalic and atraumatic.      Right Ear: External ear normal.      Left Ear: External  ear normal.      Nose: No congestion or rhinorrhea.   Eyes:      General: No scleral icterus.        Right eye: No discharge.         Left eye: No discharge.      Extraocular Movements: Extraocular movements intact.      Conjunctiva/sclera: Conjunctivae normal.   Cardiovascular:      Rate and Rhythm: Normal rate and regular rhythm.      Pulses: Normal pulses.      Heart sounds: Normal heart sounds. No murmur heard.  Pulmonary:      Effort: Pulmonary effort is normal. No respiratory distress.      Breath sounds: Normal breath sounds. No stridor. No wheezing, rhonchi or rales.   Chest:      Chest wall: No tenderness.   Abdominal:      General: Bowel sounds are normal. There is no distension.      Palpations: Abdomen is soft.      Tenderness: There is no abdominal tenderness. There is no guarding or rebound.   Musculoskeletal:      Right lower leg: No edema.      Left lower leg: No edema.   Skin:     General: Skin is warm and dry.      Coloration: Skin is not jaundiced or pale.   Neurological:      General: No focal deficit present.      Mental Status: She is alert and oriented to person, place, and time.      Cranial Nerves: No cranial nerve deficit.      Sensory: No sensory deficit.      Motor: No weakness.   Psychiatric:         Mood and Affect: Mood normal.         Behavior: Behavior normal.         Thought Content: Thought content normal.         Vital Signs  ED Triage Vitals   Temperature Pulse Respirations Blood Pressure SpO2   08/30/24 1045 08/30/24 1045 08/30/24 1045 08/30/24 1045 08/30/24 1045   (!) 97.2 °F (36.2 °C) (!) 108 18 124/80 100 %      Temp Source Heart Rate Source Patient Position - Orthostatic VS BP Location FiO2 (%)   08/30/24 1045 08/30/24 1045 08/30/24 1115 08/30/24 1115 --   Temporal Monitor Lying Right arm       Pain Score       --                  Vitals:    08/30/24 1115 08/30/24 1200 08/30/24 1230 08/30/24 1312   BP: 118/80 120/80 123/92 118/91   Pulse: 67 68 65 61   Patient Position -  Orthostatic VS: Lying Sitting Lying Lying         Visual Acuity  Visual Acuity      Flowsheet Row Most Recent Value   L Pupil Size (mm) 3   R Pupil Size (mm) 3          ED Medications  Medications - No data to display    Diagnostic Studies  Results Reviewed       Procedure Component Value Units Date/Time    POCT pregnancy, urine [[id number]]  (Normal) Resulted: 08/30/24 1228    Lab Status: Final result Updated: 08/30/24 1228     EXT Preg Test, Ur Negative     Control Valid    HS Troponin 0hr (reflex protocol) [[id number]]  (Normal) Collected: 08/30/24 1112    Lab Status: Final result Specimen: Blood from Arm, Right Updated: 08/30/24 1144     hs TnI 0hr <2 ng/L     Comprehensive metabolic panel [[id number]]  (Abnormal) Collected: 08/30/24 1112    Lab Status: Final result Specimen: Blood from Arm, Right Updated: 08/30/24 1140     Sodium 135 mmol/L      Potassium 3.9 mmol/L      Chloride 103 mmol/L      CO2 24 mmol/L      ANION GAP 8 mmol/L      BUN 10 mg/dL      Creatinine 0.66 mg/dL      Glucose 106 mg/dL      Calcium 9.6 mg/dL      AST 12 U/L      ALT 8 U/L      Alkaline Phosphatase 69 U/L      Total Protein 7.5 g/dL      Albumin 4.4 g/dL      Total Bilirubin 0.42 mg/dL      eGFR 116 ml/min/1.73sq m     Narrative:      National Kidney Disease Foundation guidelines for Chronic Kidney Disease (CKD):     Stage 1 with normal or high GFR (GFR > 90 mL/min/1.73 square meters)    Stage 2 Mild CKD (GFR = 60-89 mL/min/1.73 square meters)    Stage 3A Moderate CKD (GFR = 45-59 mL/min/1.73 square meters)    Stage 3B Moderate CKD (GFR = 30-44 mL/min/1.73 square meters)    Stage 4 Severe CKD (GFR = 15-29 mL/min/1.73 square meters)    Stage 5 End Stage CKD (GFR <15 mL/min/1.73 square meters)  Note: GFR calculation is accurate only with a steady state creatinine    CBC and differential [[id number]] Collected: 08/30/24 1112    Lab Status: Final result Specimen: Blood from Arm, Right Updated: 08/30/24 1123     WBC 7.67 Thousand/uL       RBC 4.87 Million/uL      Hemoglobin 14.6 g/dL      Hematocrit 44.3 %      MCV 91 fL      MCH 30.0 pg      MCHC 33.0 g/dL      RDW 12.3 %      MPV 9.5 fL      Platelets 266 Thousands/uL      nRBC 0 /100 WBCs      Segmented % 74 %      Immature Grans % 0 %      Lymphocytes % 20 %      Monocytes % 5 %      Eosinophils Relative 1 %      Basophils Relative 0 %      Absolute Neutrophils 5.64 Thousands/µL      Absolute Immature Grans 0.03 Thousand/uL      Absolute Lymphocytes 1.51 Thousands/µL      Absolute Monocytes 0.41 Thousand/µL      Eosinophils Absolute 0.06 Thousand/µL      Basophils Absolute 0.02 Thousands/µL                No orders to display          Procedures  ECG 12 Lead Documentation Only    Date/Time: 8/30/2024 12:21 PM    Performed by: Abhishek Hong DO  Authorized by: Abhishek Hong DO    Indications / Diagnosis:  Presyncope  ECG reviewed by me, the ED Provider: yes    Patient location:  ED  Previous ECG:     Previous ECG:  Unavailable  Interpretation:     Interpretation: normal    Rate:     ECG rate:  63    ECG rate assessment: normal    Rhythm:     Rhythm: sinus rhythm    Ectopy:     Ectopy: none    QRS:     QRS axis:  Normal    QRS intervals:  Normal  Conduction:     Conduction: normal    ST segments:     ST segments:  Normal  T waves:     T waves: normal      ED Course  ED Course as of 08/30/24 1555   Fri Aug 30, 2024   1214 Vital signs reviewed.  Physical exam is largely unremarkable.  Patient expressing lightheadedness, numbness/tingling in her hands earlier in the day.  States that she may be dehydrated.  Did not eat breakfast this morning.  Nonfocal neurological exam.      Serum laboratory workup is largely unremarkable.  Troponin negative.  No significant electrolyte abnormalities.  Will orally hydrate.  Patient states that her last menstrual period was approximately 2 weeks ago.  Denies abdominal pain, vaginal bleeding. Will obtain labs, ECG. Will orally hydrate.    1308 Patient tolerating  PO  The patient was reevaluated multiple times throughout the ED visit.  Symptoms improving. Low suspicion for ACS, PE or intracranial pathology given the patient's hx/clinical exam. Recommendations/return precautions were discussed with the patient.  All questions addressed.  Stable for discharge.     SBIRT 22yo+      Flowsheet Row Most Recent Value   Initial Alcohol Screen: US AUDIT-C     1. How often do you have a drink containing alcohol? 0 Filed at: 08/30/2024 1045   2. How many drinks containing alcohol do you have on a typical day you are drinking?  0 Filed at: 08/30/2024 1045   3b. FEMALE Any Age, or MALE 65+: How often do you have 4 or more drinks on one occassion? 0 Filed at: 08/30/2024 1045   Audit-C Score 0 Filed at: 08/30/2024 1045   LOW: How many times in the past year have you...    Used an illegal drug or used a prescription medication for non-medical reasons? Never Filed at: 08/30/2024 1045          Medical Decision Making  This patient presents with presyncope, palpitations.   Diagnostic considerations include vasovagal syncope, acute kidney injury, ectopic pregnancy, UTI. See ED Course.         Problems Addressed:  Near syncope: acute illness or injury  Palpitations: acute illness or injury    Amount and/or Complexity of Data Reviewed  Labs: ordered. Decision-making details documented in ED Course.  ECG/medicine tests: ordered and independent interpretation performed. Decision-making details documented in ED Course.      Disposition  Final diagnoses:   Near syncope   Palpitations     Time reflects when diagnosis was documented in both MDM as applicable and the Disposition within this note       Time User Action Codes Description Comment    8/30/2024  1:09 PM Abhishek Hong Add [R55] Near syncope     8/30/2024  1:09 PM Abhishek Hong Add [R00.2] Palpitations           ED Disposition       ED Disposition   Discharge    Condition   Stable    Date/Time   Fri Aug 30, 2024 1309    Comment   Min  Monet Medina discharge to home/self care.                   Follow-up Information    None         Discharge Medication List as of 8/30/2024  1:10 PM        CONTINUE these medications which have NOT CHANGED    Details   sertraline (Zoloft) 50 mg tablet Take 1 tablet (50 mg total) by mouth daily, Starting Tue 3/19/2024, Normal             No discharge procedures on file.    PDMP Review       None            ED Provider  Electronically Signed by             Abhishek Hong DO  08/30/24 4541

## 2024-08-30 NOTE — DISCHARGE INSTRUCTIONS
The laboratory studies, ECG that were obtained did not show any significant abnormalities.    Drink plenty of clear/hydrating/electrolyte rich fluids over the next few days.  Do not skip any meals.    Return to the emergency department for any concerning signs or symptoms.

## 2024-08-30 NOTE — Clinical Note
Min Medina was seen and treated in our emergency department on 8/30/2024.                Diagnosis:     Min  .    She may return on this date:     Please excuse Thanh Medina from work on 8/30/2024     If you have any questions or concerns, please don't hesitate to call.      Abhishek Hong, DO    ______________________________           _______________          _______________  Hospital Representative                              Date                                Time

## 2024-09-06 ENCOUNTER — OFFICE VISIT (OUTPATIENT)
Dept: FAMILY MEDICINE CLINIC | Facility: CLINIC | Age: 34
End: 2024-09-06
Payer: COMMERCIAL

## 2024-09-06 VITALS
HEIGHT: 71 IN | SYSTOLIC BLOOD PRESSURE: 102 MMHG | TEMPERATURE: 97.1 F | DIASTOLIC BLOOD PRESSURE: 70 MMHG | OXYGEN SATURATION: 96 % | WEIGHT: 149.2 LBS | BODY MASS INDEX: 20.89 KG/M2 | HEART RATE: 83 BPM

## 2024-09-06 DIAGNOSIS — R00.2 PALPITATIONS: Primary | ICD-10-CM

## 2024-09-06 PROCEDURE — 99214 OFFICE O/P EST MOD 30 MIN: CPT | Performed by: FAMILY MEDICINE

## 2024-09-06 RX ORDER — ALPRAZOLAM 0.25 MG
0.25 TABLET ORAL
Qty: 30 TABLET | Refills: 0 | Status: SHIPPED | OUTPATIENT
Start: 2024-09-06

## 2024-09-07 NOTE — PROGRESS NOTES
"Ambulatory Visit  Name: Min Medina      : 1990      MRN: [id number]  Encounter Provider: Duc Boone MD  Encounter Date: 2024   Encounter department: [facility]    Assessment & Plan   1. Palpitations  -     TSH, 3rd generation with Free T4 reflex; Future  -     Echo complete w/ contrast if indicated; Future; Expected date: 2024  -     Holter monitor; Future; Expected date: 2024  -     CBC (Includes Diff/Plt) (Refl); Future  -     ALPRAZolam (XANAX) 0.25 mg tablet; Take 1 tablet (0.25 mg total) by mouth daily at bedtime as needed for anxiety       History of Present Illness     She experienced palpitations with a burning sensation throughout her body.  Her symptoms lasted a few hours.  She had no CP but felt weak.  She had no near syncope or syncope.  She denies diaphoresis.  She has a family h/o Afib.  She states her depression and anxiety are generally well controlled.  She had a normal EKG in the ER as well as normal labs.        Review of Systems   Cardiovascular:  Positive for palpitations.   All other systems reviewed and are negative.      Objective     /70 (BP Location: Right arm, Patient Position: Sitting)   Pulse 83   Temp (!) 97.1 °F (36.2 °C) (Tympanic)   Ht 5' 11\" (1.803 m)   Wt 67.7 kg (149 lb 3.2 oz)   SpO2 96%   BMI 20.81 kg/m²     Physical Exam  Vitals and nursing note reviewed.   Constitutional:       Appearance: Normal appearance. She is normal weight.   Cardiovascular:      Rate and Rhythm: Normal rate and regular rhythm.      Pulses: Normal pulses.      Heart sounds: Normal heart sounds.   Pulmonary:      Effort: Pulmonary effort is normal.      Breath sounds: Normal breath sounds.   Abdominal:      General: Abdomen is flat. Bowel sounds are normal.      Palpations: Abdomen is soft.   Musculoskeletal:         General: Normal range of motion.      Cervical back: Normal range of motion and neck supple.   Skin:     General: Skin " is warm and dry.      Capillary Refill: Capillary refill takes less than 2 seconds.   Neurological:      General: No focal deficit present.      Mental Status: She is alert and oriented to person, place, and time. Mental status is at baseline.   Psychiatric:         Mood and Affect: Mood normal.         Behavior: Behavior normal.         Thought Content: Thought content normal.         Judgment: Judgment normal.       Administrative Statements

## 2024-09-20 ENCOUNTER — HOSPITAL ENCOUNTER (OUTPATIENT)
Dept: NON INVASIVE DIAGNOSTICS | Facility: HOSPITAL | Age: 34
Discharge: HOME | End: 2024-09-20
Payer: COMMERCIAL

## 2024-09-20 ENCOUNTER — LAB (OUTPATIENT)
Dept: LAB | Facility: HOSPITAL | Age: 34
End: 2024-09-20
Payer: COMMERCIAL

## 2024-09-20 VITALS
HEART RATE: 70 BPM | SYSTOLIC BLOOD PRESSURE: 102 MMHG | WEIGHT: 149 LBS | HEIGHT: 71 IN | BODY MASS INDEX: 20.86 KG/M2 | DIASTOLIC BLOOD PRESSURE: 70 MMHG

## 2024-09-20 DIAGNOSIS — R00.2 PALPITATIONS: ICD-10-CM

## 2024-09-20 LAB
AORTIC ROOT: 2.6 CM
APICAL FOUR CHAMBER EJECTION FRACTION: 65 %
ASCENDING AORTA: 2.7 CM
BASOPHILS # BLD AUTO: 0.06 THOUSANDS/ΜL (ref 0–0.1)
BASOPHILS NFR BLD AUTO: 1 % (ref 0–1)
BSA FOR ECHO PROCEDURE: 1.86 M2
E WAVE DECELERATION TIME: 198 MS
E/A RATIO: 1.24
EOSINOPHIL # BLD AUTO: 0.1 THOUSAND/ΜL (ref 0–0.61)
EOSINOPHIL NFR BLD AUTO: 2 % (ref 0–6)
ERYTHROCYTE [DISTWIDTH] IN BLOOD BY AUTOMATED COUNT: 12.5 % (ref 11.6–15.1)
FRACTIONAL SHORTENING: 46 (ref 28–44)
HCT VFR BLD AUTO: 41.6 % (ref 34.8–46.1)
HGB BLD-MCNC: 13.7 G/DL (ref 11.5–15.4)
IMM GRANULOCYTES # BLD AUTO: 0.02 THOUSAND/UL (ref 0–0.2)
IMM GRANULOCYTES NFR BLD AUTO: 0 % (ref 0–2)
INTERVENTRICULAR SEPTUM IN DIASTOLE (PARASTERNAL SHORT AXIS VIEW): 0.8 CM
INTERVENTRICULAR SEPTUM: 0.8 CM (ref 0.6–1.1)
LAAS-AP2: 10.3 CM2
LAAS-AP4: 7.3 CM2
LEFT ATRIUM SIZE: 2.7 CM
LEFT ATRIUM VOLUME (MOD BIPLANE): 17 ML
LEFT ATRIUM VOLUME INDEX (MOD BIPLANE): 9.1 ML/M2
LEFT INTERNAL DIMENSION IN SYSTOLE: 2.5 CM (ref 2.1–4)
LEFT VENTRICLE DIASTOLIC VOLUME (MOD BIPLANE): 45 ML
LEFT VENTRICLE DIASTOLIC VOLUME INDEX (MOD BIPLANE): 24.2 ML/M2
LEFT VENTRICLE SYSTOLIC VOLUME (MOD BIPLANE): 16 ML
LEFT VENTRICLE SYSTOLIC VOLUME INDEX (MOD BIPLANE): 8.6 ML/M2
LEFT VENTRICULAR INTERNAL DIMENSION IN DIASTOLE: 4.6 CM (ref 3.5–6)
LEFT VENTRICULAR POSTERIOR WALL IN END DIASTOLE: 0.9 CM
LEFT VENTRICULAR STROKE VOLUME: 75 ML
LV EF: 64 %
LVSV (TEICH): 75 ML
LYMPHOCYTES # BLD AUTO: 2.01 THOUSANDS/ΜL (ref 0.6–4.47)
LYMPHOCYTES NFR BLD AUTO: 30 % (ref 14–44)
MCH RBC QN AUTO: 30.2 PG (ref 26.8–34.3)
MCHC RBC AUTO-ENTMCNC: 32.9 G/DL (ref 31.4–37.4)
MCV RBC AUTO: 92 FL (ref 82–98)
MONOCYTES # BLD AUTO: 0.38 THOUSAND/ΜL (ref 0.17–1.22)
MONOCYTES NFR BLD AUTO: 6 % (ref 4–12)
MV E'TISSUE VEL-LAT: 15 CM/S
MV E'TISSUE VEL-SEP: 10 CM/S
MV PEAK A VEL: 0.41 M/S
MV PEAK E VEL: 51 CM/S
MV STENOSIS PRESSURE HALF TIME: 57 MS
MV VALVE AREA P 1/2 METHOD: 3.86
NEUTROPHILS # BLD AUTO: 4.2 THOUSANDS/ΜL (ref 1.85–7.62)
NEUTS SEG NFR BLD AUTO: 61 % (ref 43–75)
NRBC BLD AUTO-RTO: 0 /100 WBCS
PLATELET # BLD AUTO: 299 THOUSANDS/UL (ref 149–390)
PMV BLD AUTO: 9.6 FL (ref 8.9–12.7)
RA PRESSURE ESTIMATED: 3 MMHG
RBC # BLD AUTO: 4.53 MILLION/UL (ref 3.81–5.12)
RIGHT ATRIUM AREA SYSTOLE A4C: 9.8 CM2
RIGHT VENTRICLE ID DIMENSION: 3.8 CM
SL CV LEFT ATRIUM LENGTH A2C: 4 CM
SL CV PED ECHO LEFT VENTRICLE DIASTOLIC VOLUME (MOD BIPLANE) 2D: 97 ML
SL CV PED ECHO LEFT VENTRICLE SYSTOLIC VOLUME (MOD BIPLANE) 2D: 22 ML
TRICUSPID ANNULAR PLANE SYSTOLIC EXCURSION: 2.2 CM
TSH SERPL DL<=0.05 MIU/L-ACNC: 1.06 UIU/ML (ref 0.45–4.5)
WBC # BLD AUTO: 6.77 THOUSAND/UL (ref 4.31–10.16)

## 2024-09-20 PROCEDURE — 36415 COLL VENOUS BLD VENIPUNCTURE: CPT

## 2024-09-20 PROCEDURE — 85025 COMPLETE CBC W/AUTO DIFF WBC: CPT

## 2024-09-20 PROCEDURE — 84443 ASSAY THYROID STIM HORMONE: CPT

## 2024-09-20 PROCEDURE — 93226 XTRNL ECG REC<48 HR SCAN A/R: CPT

## 2024-09-20 PROCEDURE — 93306 TTE W/DOPPLER COMPLETE: CPT

## 2024-09-20 PROCEDURE — 93225 XTRNL ECG REC<48 HRS REC: CPT

## 2024-09-24 PROCEDURE — 93227 XTRNL ECG REC<48 HR R&I: CPT | Performed by: INTERNAL MEDICINE

## 2024-12-10 ENCOUNTER — OCCMED (OUTPATIENT)
Dept: URGENT CARE | Facility: MEDICAL CENTER | Age: 34
End: 2024-12-10
Payer: OTHER MISCELLANEOUS
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
## 2024-12-10 PROCEDURE — 99283 EMERGENCY DEPT VISIT LOW MDM: CPT

## 2024-12-10 PROCEDURE — G0382 LEV 3 HOSP TYPE B ED VISIT: HCPCS

## 2024-12-11 ENCOUNTER — APPOINTMENT (OUTPATIENT)
Dept: LAB | Facility: MEDICAL CENTER | Age: 34
End: 2024-12-11
Payer: COMMERCIAL
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
## 2024-12-11 LAB
HBV SURFACE AB SER-ACNC: >500 MIU/ML
HBV SURFACE AG SER QL: NORMAL
HCV AB SER QL: NORMAL

## 2024-12-11 PROCEDURE — 36415 COLL VENOUS BLD VENIPUNCTURE: CPT

## 2024-12-11 PROCEDURE — 87389 HIV-1 AG W/HIV-1&-2 AB AG IA: CPT

## 2024-12-11 PROCEDURE — 86706 HEP B SURFACE ANTIBODY: CPT

## 2024-12-11 PROCEDURE — 86803 HEPATITIS C AB TEST: CPT

## 2024-12-11 PROCEDURE — 84460 ALANINE AMINO (ALT) (SGPT): CPT

## 2024-12-11 PROCEDURE — 87340 HEPATITIS B SURFACE AG IA: CPT

## 2024-12-12 LAB
ALT SERPL W P-5'-P-CCNC: 8 U/L (ref 7–52)
HIV 1+2 AB+HIV1 P24 AG SERPL QL IA: NORMAL
HIV 2 AB SERPL QL IA: NORMAL
HIV1 AB SERPL QL IA: NORMAL
HIV1 P24 AG SERPL QL IA: NORMAL

## 2024-12-13 ENCOUNTER — RESULTS FOLLOW-UP (OUTPATIENT)
Dept: ADMINISTRATIVE | Facility: HOSPITAL | Age: 34
End: 2024-12-13

## 2024-12-14 ENCOUNTER — RESULTS FOLLOW-UP (OUTPATIENT)
Dept: URGENT CARE | Facility: MEDICAL CENTER | Age: 34
End: 2024-12-14

## 2025-01-06 DIAGNOSIS — R93.89 ABNORMAL CT OF THE CHEST: Primary | ICD-10-CM

## 2025-01-18 ENCOUNTER — HOSPITAL ENCOUNTER (OUTPATIENT)
Dept: CT IMAGING | Facility: HOSPITAL | Age: 35
Discharge: HOME | End: 2025-01-18
Payer: COMMERCIAL

## 2025-01-18 DIAGNOSIS — R93.89 ABNORMAL CT OF THE CHEST: ICD-10-CM

## 2025-01-18 PROCEDURE — 71250 CT THORAX DX C-: CPT

## 2025-01-22 ENCOUNTER — RESULTS FOLLOW-UP (OUTPATIENT)
Dept: FAMILY MEDICINE CLINIC | Facility: CLINIC | Age: 35
End: 2025-01-22

## 2025-01-22 DIAGNOSIS — R93.89 ABNORMAL CT OF THE CHEST: Primary | ICD-10-CM

## 2025-01-26 ENCOUNTER — HOSPITAL ENCOUNTER (OUTPATIENT)
Dept: ULTRASOUND IMAGING | Facility: HOSPITAL | Age: 35
Discharge: HOME | End: 2025-01-26
Payer: COMMERCIAL

## 2025-01-26 DIAGNOSIS — R93.89 ABNORMAL CT OF THE CHEST: ICD-10-CM

## 2025-01-26 PROCEDURE — 76775 US EXAM ABDO BACK WALL LIM: CPT

## 2025-01-30 ENCOUNTER — RESULTS FOLLOW-UP (OUTPATIENT)
Dept: FAMILY MEDICINE CLINIC | Facility: CLINIC | Age: 35
End: 2025-01-30

## 2025-01-30 ENCOUNTER — TELEPHONE (OUTPATIENT)
Age: 35
End: 2025-01-30

## 2025-01-30 DIAGNOSIS — N28.1 COMPLEX RENAL CYST: Primary | ICD-10-CM

## 2025-01-30 NOTE — TELEPHONE ENCOUNTER
New Patient    Appointment Scheduling  What office location does the patient prefer?: [place]  What is the reason for the patient's appointment?: Complex Renal Cyst   Have patient records been requested?:  If No, are the records showing in Epic: Yes    Appointment Details  Date: 2/6/2025  Time: 10:00am      Location: [facility]   Provider:  Oliver Hu  Does the appointment need further review? No - scheduled within 4 weeks.      HISTORY  Is the patient having active symptoms? If so, describe symptoms:   Has the patient had any previous Urologist(s)?: 10+ years ago   Was the patient seen in the ED?: No  Has the patient had any outside testing done?: No  Does patient have Imaging/Lab Results: US Kidney Bladder 1/26/25  Does the patient have a personal history of any cancer?: No    INSURANCE   Have you confirmed Patient's insurance? Yes  Is the insurance accepted? Yes   Is the insurance active? Yes

## 2025-01-30 NOTE — RESULT ENCOUNTER NOTE
Your u/s needs follow-up.  They are calling it a complex cyst with calcifications.  I'm not sure what to make of this, so I'm going to get urology's opinion.  This is done with an ABUNDANCE OF CAUTION.  I don't think it is serious, I do not take chances.

## 2025-02-06 ENCOUNTER — TELEPHONE (OUTPATIENT)
Dept: UROLOGY | Facility: CLINIC | Age: 35
End: 2025-02-06

## 2025-02-06 NOTE — TELEPHONE ENCOUNTER
Contacted patient and re-scheduled her NP appointment with KATIA Lepe from this morning to 2/11/25 due to weather.

## 2025-02-11 ENCOUNTER — OFFICE VISIT (OUTPATIENT)
Dept: UROLOGY | Facility: CLINIC | Age: 35
End: 2025-02-11
Payer: COMMERCIAL

## 2025-02-11 VITALS
BODY MASS INDEX: 27.14 KG/M2 | SYSTOLIC BLOOD PRESSURE: 124 MMHG | WEIGHT: 159 LBS | DIASTOLIC BLOOD PRESSURE: 70 MMHG | HEIGHT: 64 IN | HEART RATE: 90 BPM | OXYGEN SATURATION: 98 %

## 2025-02-11 DIAGNOSIS — N13.30 HYDRONEPHROSIS, UNSPECIFIED HYDRONEPHROSIS TYPE: ICD-10-CM

## 2025-02-11 DIAGNOSIS — N28.1 COMPLEX RENAL CYST: Primary | ICD-10-CM

## 2025-02-11 PROCEDURE — 99203 OFFICE O/P NEW LOW 30 MIN: CPT

## 2025-02-11 NOTE — PROGRESS NOTES
Name: Min Medina      : 1990      MRN: [id number]  Encounter Provider: KATIA Salgado  Encounter Date: 2025   Encounter department: [facility] UROLOGY [place]    :  Assessment & Plan  Complex renal cyst  Patient has a 2 cm mildly complex right upper pole renal cyst with peripheral calcification.  It appears this has been stable in size going back to at least .  This is likely benign.  However I am recommending repeat CT imaging for further evaluation.  Typically this would only require a CT abdomen with and without contrast but given that her recent ultrasound shows possible hydronephrosis versus a extrarenal pelvis we will proceed with a CT renal protocol instead.  She will follow-up in about 4 to 6 weeks to review those results.    Orders:    Ambulatory Referral to Urology    CT renal protocol; Future    Basic metabolic panel; Future    Hydronephrosis, unspecified hydronephrosis type  Recent renal ultrasound showed extrarenal pelvis versus mild hydronephrosis, right ureteral jet was detected.  I suspect that this is likely a over read or simply extrarenal pelvis but we will further evaluate this with a CT renal protocol.    Orders:    CT renal protocol; Future    Basic metabolic panel; Future        History of Present Illness     New patient to office with PMHx significant for hypertension, gestational diabetes, anxiety, family history of breast cancer    Patient presents today for evaluation of a complex right renal cyst as well as hydronephrosis versus extrarenal pelvis.    She reports that her brother was recently diagnosed with sarcoidosis and had a lung biopsy.  This prompted her to review her MyChart records and she found that a CT of the abdomen and pelvis in 2016 had mentioned pleural plaque and at the time radiology had recommended a follow-up CT in 12 months.  This was never completed.  Of note CT imaging then had shown a low-density cyst in the upper pole of  the right kidney measuring 1.9 x 2.0 cm.    She spoke to her PCP regarding these findings which prompted a CT of the chest without contrast to be completed.  This showed a probable 1.5 cm cyst in the lower pole of the right kidney with a possible mural calcification or adjacent nonobstructing calculus.  Radiology recommended a renal ultrasound for further characterization.    Renal ultrasound completed on 1/26/2025 showed a extrarenal pelvis versus mild right-sided hydronephrosis.  Right ureteral jet was present.  Mildly complex cyst in the upper pole of the right kidney with peripheral calcification measuring 2 cm.    She does report a history of kidney stones in the past no recent episodes.  She denies any issues with recurrent UTIs or gross hematuria.    Negative smoking history, no chemical exposure history and no known family history of urinary tract malignancy.         Review of Systems   Constitutional:  Negative for chills and fever.   HENT:  Negative for ear pain and sore throat.    Eyes:  Negative for pain and visual disturbance.   Respiratory:  Negative for cough and shortness of breath.    Cardiovascular:  Negative for chest pain and palpitations.   Gastrointestinal:  Negative for abdominal pain and vomiting.   Genitourinary:  Negative for dysuria and hematuria.   Musculoskeletal:  Negative for arthralgias and back pain.   Skin:  Negative for color change and rash.   Neurological:  Negative for seizures and syncope.   All other systems reviewed and are negative.    Objective   There were no vitals taken for this visit.    Physical Exam  Vitals and nursing note reviewed.   Constitutional:       General: She is not in acute distress.     Appearance: She is well-developed.   HENT:      Head: Normocephalic and atraumatic.   Eyes:      Conjunctiva/sclera: Conjunctivae normal.   Cardiovascular:      Rate and Rhythm: Normal rate and regular rhythm.      Heart sounds: No murmur heard.  Pulmonary:      Effort:  Pulmonary effort is normal. No respiratory distress.      Breath sounds: Normal breath sounds.   Abdominal:      Palpations: Abdomen is soft.      Tenderness: There is no abdominal tenderness.   Musculoskeletal:         General: No swelling.      Cervical back: Neck supple.   Skin:     General: Skin is warm and dry.      Capillary Refill: Capillary refill takes less than 2 seconds.   Neurological:      Mental Status: She is alert.   Psychiatric:         Mood and Affect: Mood normal.           Imagin2025    RENAL ULTRASOUND     INDICATION: R93.89: Abnormal findings on diagnostic imaging of other specified body structures.     COMPARISON: CT chest dated 2025     TECHNIQUE: Ultrasound of the retroperitoneum was performed with a curvilinear transducer utilizing volumetric sweeps and still imaging techniques.     FINDINGS:     KIDNEYS:  Symmetric and normal size.  Right kidney: 11.0 x 4.5 x 6.3 cm. Volume 161.8 mL  Left kidney: 11.5 x 6.5 x 5.8 cm. Volume 226.0 mL     Right kidney  Normal echogenicity and contour.  No mass is identified.  Mildly complex cyst at the upper pole measuring 2 x 1.9 x 1.9 cm with peripheral calcification.  Extrarenal pelvis versus mild hydronephrosis.  No shadowing calculi.  No perinephric fluid collections.     Left kidney  Normal echogenicity and contour.  No mass is identified.  No hydronephrosis.  No shadowing calculi.  No perinephric fluid collections.     URETERS:  Nonvisualized.     BLADDER:  Normally distended.  No focal thickening or mass lesions.  Bilateral ureteral jets detected.        IMPRESSION:     1.  Extrarenal pelvis versus mild right-sided hydronephrosis. Right ureteral jet is present.  2.  Mildly complex cyst in the upper pole the right kidney with peripheral calcification measuring 2 cm.        2025    CT CHEST WITHOUT IV CONTRAST     INDICATION: R93.89: Abnormal findings on diagnostic imaging of other specified body structures.     COMPARISON:  3/31/2016; prior outside CT report 7/24/2011. Images are not available for direct comparison.     TECHNIQUE: CT examination of the chest was performed without intravenous contrast. Multiplanar 2D reformatted images were created from the source data.     This examination, like all CT scans performed in the [facility], was performed utilizing techniques to minimize radiation dose exposure, including the use of iterative reconstruction and automated exposure control. Radiation dose length   product (DLP) for this visit: 258.48 mGy-cm     FINDINGS:     LUNGS: Lungs are clear. There is no tracheal or endobronchial lesion.     PLEURA: Unremarkable.     HEART/GREAT VESSELS: Heart is unremarkable for patient's age. No thoracic aortic aneurysm.     MEDIASTINUM AND BRIANNA: Unremarkable.     CHEST WALL AND LOWER NECK: Unremarkable.     VISUALIZED STRUCTURES IN THE UPPER ABDOMEN: In the lower pole of the right kidney there is a rounded hypodense lesion measuring 1.5 cm with a punctate calcification series 301 image 140, incompletely imaged and evaluated.     OSSEOUS STRUCTURES: No acute fracture or destructive osseous lesion.     IMPRESSION:        1. The lungs are clear.  2. Probable 1.5 cm cyst in the lower pole the right kidney with a possible mural calcification or adjacent nonobstructing calculus. Renal ultrasound recommended for further characterization.        Please Note:  Voice dictation software has been used to create this document. There may be inadvertent transcriptions errors.     KATIA Salagdo 02/11/25

## 2025-02-24 ENCOUNTER — HOSPITAL ENCOUNTER (OUTPATIENT)
Dept: CT IMAGING | Facility: HOSPITAL | Age: 35
Discharge: HOME | End: 2025-02-24
Payer: COMMERCIAL

## 2025-02-24 DIAGNOSIS — N28.1 COMPLEX RENAL CYST: ICD-10-CM

## 2025-02-24 DIAGNOSIS — N13.30 HYDRONEPHROSIS, UNSPECIFIED HYDRONEPHROSIS TYPE: ICD-10-CM

## 2025-02-24 PROCEDURE — 74178 CT ABD&PLV WO CNTR FLWD CNTR: CPT

## 2025-02-24 RX ADMIN — IOHEXOL 120 ML: 350 INJECTION, SOLUTION INTRAVENOUS at 18:32

## 2025-02-25 ENCOUNTER — DOCUMENTATION (OUTPATIENT)
Dept: GENETICS | Facility: CLINIC | Age: 35
End: 2025-02-25

## 2025-02-27 ENCOUNTER — OFFICE VISIT (OUTPATIENT)
Dept: GENETICS | Facility: CLINIC | Age: 35
End: 2025-02-27

## 2025-02-27 DIAGNOSIS — Z80.3 FAMILY HISTORY OF BREAST CANCER: ICD-10-CM

## 2025-02-27 DIAGNOSIS — Z80.0 FAMILY HISTORY OF COLON CANCER: ICD-10-CM

## 2025-02-27 DIAGNOSIS — Z80.41 FAMILY HISTORY OF OVARIAN CANCER: Primary | ICD-10-CM

## 2025-02-27 NOTE — PROGRESS NOTES
Pre-Test Genetic Counseling Consult Note    Patient Name: Min Medina   /Age: 1990/34 y.o.  Referring Provider: KATIA Mix    Date of Service: 2025  Genetic Counselor: Linh Marino MS, [employer]  Interpretation Services: None  Location: Telephone consult   Length of Visit:  25 Minutes    Min was referred to the [facility] Cancer Risk and Genetic Assessment Program due to her family history of breast, ovarian, and colon cancer . she presents today to discuss the possibility of a hereditary cancer syndrome, options for genetic testing, and implications for her and her family.     Cancer History and Treatment:   Personal History:   Oncology History    No history exists.       Screening Hx:   Breast:  Breast Imaging:   Mammogram and Right Breast US (May 2024):   Impression: No mammographic or sonographic abnormality in the right inner breast over the patient's area of clinical concern.  Clinical follow-up recommended.  No mammographic evidence of malignancy.  Breast Biopsy: none   Breast Density: Extremely dense    Colon:  Colonoscopy: none     Gynecologic:  Ovaries and Uterus intact     Skin:  No current screening    Reproductive History  Age at menarche: 15  Age at first live birth:  31  Menopause: Premenopausal  Hormone replacement: none    Medical and Surgical History  Pertinent surgical history:   Past Surgical History:   Procedure Laterality Date     SECTION      COLPOSCOPY      EXPLORATORY LAPAROTOMY      HYSTEROSCOPY W/ POLYPECTOMY      KIDNEY STONE SURGERY      IN  DELIVERY ONLY N/A 2022    Procedure:  SECTION ();  Surgeon: Bola Cummings MD;  Location: [facility];  Service: Obstetrics    TONSILLECTOMY      WISDOM TOOTH EXTRACTION        Pertinent medical history:  Past Medical History:   Diagnosis Date    Abnormal Pap smear of cervix     Chronic hypertension affecting pregnancy     Female infertility     Gestational diabetes     HPV in female  "    Kidney stone     Varicella          Other History:  Height:   Ht Readings from Last 1 Encounters:   02/11/25 5' 4\" (1.626 m)     Weight:   Wt Readings from Last 1 Encounters:   02/11/25 72.1 kg (159 lb)       Relevant Family History   Patient reports non-Ashkenazi Taoism ancestry.     Maternal Family History:  Grandmother: colon cancer diagnosed at unknown age; ovarian cancer diagnosed in 40s, DADA/BSO; breast cancer diagnosed at unknown age, possibly 40s, s/p mastectomy + chemo (d.72)   Aunt: cervical cancer diagnosed at 50 (d.53 CHF)     Paternal Family History:  Grandmother: breast cancer diagnosed at 54, chemo (currently 75 y/o)     Please refer to the scanned pedigree in the Media Tab for a complete family history     *All history is reported as provided by the patient; records are not available for review, except where indicated.     Assessment:  We discussed sporadic, familial and hereditary cancer.  We reviewed that only 5-10% of cancers are considered hereditary. Cancers such as lung cancer, cervical cancer, testicular cancer, and liver cancer very rarely have a hereditary etiology, and we cannot test for a genetic predisposition for these cancers at this time.  We also discussed the many factors that influence our risk for cancer such as age, environmental exposures, lifestyle choices and family history.      We reviewed the indications suggestive of a hereditary predisposition to cancer.    Of note, while testing an affected family member is most informative, it is also appropriate to test unaffected family members who meet testing criteria (NCCN Guidelines for Genetic/Familial High-Risk Assessment: Breast, Ovarian, and Pancreatic).      Genetic testing is indicated for Min based on the following criteria:   Meets NCCN V2.2025 Testing Criteria for Ovarian Cancer Susceptibility Genes: second-degree relative (maternal grandmother) with ovarian cancer diagnosis  Meets NCCN V2.2025 Testing Criteria for " High-Penetrance Breast Cancer Susceptibility Genes: second-degree relative (maternal grandmother) with breast cancer diagnosis, likely <51 y/o     The risks, benefits, and limitations of genetic testing were reviewed with the patient, as well as genetic discrimination laws, and possible test results (positive, negative, variants of uncertain significance) and their clinical implications. If positive for a mutation, options for managing cancer risk including increased surveillance, chemoprevention, and in some cases prophylactic surgery were discussed.     Min was informed that if a hereditary cancer syndrome was identified in her, first degree relatives (parents, siblings, and children) have a chance of also inheriting the condition. Genetic testing would allow for predictive genetic testing in other relatives, who may also be at risk depending on their degree of relation.     Billing:  Most individuals pay <$100 for hereditary cancer genetic testing. If insurance covers the cost of the testing, individuals may still pay out of pocket secondary to co-pays, co-insurance, or deductibles. If the cost of the testing exceeds $100, the lab will reach out to the patient via phone or e-mail. The patient will then have the option to proceed with the testing, cancel the testing, or elect the self-pay option of $250. Min verbalized understanding.     Plan: Patient decided to proceed with testing and provided consent.    Summary:     Sample Collection:  An order was placed and the patient was instructed to go to a [facility] lab for a blood collection    Genetic Testing Preformed: CancerNext-Expanded + RNA (76 genes): AIP, ALK, APC, QUINCY, AXIN2, BAP1, BARD1, BMPR1A, BRCA1, BRCA2, BRIP1, CDC73, CDH1, CDK4, CDKN1B, CDKN2A, CEBPA, CHEK2, CTNNA1, DDX41, DICER1, EGFR, EPCAM, ETV6, FH, FLCN, GATA2, GREM1, HOXB13, KIT, LZTR1, MAX, MBD4, MEN1, MET, MITF, MLH1, MSH2, MSH3, MSH6, MUTYH, NF1, NF2, NTHL1, PALB2, PDGFRA, PHOX2B, PMS2,  POLD1, POLE, POT1, JMIMS7I, PTCH1, PTEN, RAD51C, RAD51D, RB1, RET, RUNX1, SDHA, SDHAF2, SDHB, SDHC, SDHD, SMAD4, SMARCA4, SMARCB1, SMARCE1, STK11, SUFU, PCDT452, TP53, TSC1, TSC2, VHL, WT1    Result Call Information:  In the event that we need to reach Min via telephone:  I confirmed the patient's mobile number on file as the best number to call with results  I confirmed with the patient that we can leave a voicemail on her mobile number    Results take approximately 2-3 weeks to complete once test is started.    Min will be notified via [employer] once results are available.      Additional recommendations for surveillance/medical management will be made pending genetic test results.

## 2025-03-05 ENCOUNTER — RESULTS FOLLOW-UP (OUTPATIENT)
Dept: UROLOGY | Facility: CLINIC | Age: 35
End: 2025-03-05

## 2025-03-05 NOTE — RESULT ENCOUNTER NOTE
Results were sent via [employer]. Bosniak 2 right renal lesion, statistically benign does not require further workup.  Has follow-up in April to review.

## 2025-04-08 ENCOUNTER — OFFICE VISIT (OUTPATIENT)
Dept: UROLOGY | Facility: CLINIC | Age: 35
End: 2025-04-08
Payer: COMMERCIAL

## 2025-04-08 VITALS
BODY MASS INDEX: 26.98 KG/M2 | WEIGHT: 158 LBS | SYSTOLIC BLOOD PRESSURE: 100 MMHG | OXYGEN SATURATION: 98 % | HEART RATE: 78 BPM | DIASTOLIC BLOOD PRESSURE: 70 MMHG | HEIGHT: 64 IN | TEMPERATURE: 97.9 F

## 2025-04-08 DIAGNOSIS — N28.1 COMPLEX RENAL CYST: Primary | ICD-10-CM

## 2025-04-08 DIAGNOSIS — N13.30 HYDRONEPHROSIS, UNSPECIFIED HYDRONEPHROSIS TYPE: ICD-10-CM

## 2025-04-08 PROCEDURE — 99213 OFFICE O/P EST LOW 20 MIN: CPT

## 2025-04-08 NOTE — PROGRESS NOTES
Name: Min Medina      : 1990      MRN: [id number]  Encounter Provider: KATIA Salgado  Encounter Date: 2025   Encounter department: [facility] UROLOGY [place]    :  Assessment & Plan  Complex renal cyst  2.4 cm Bosniak 2 right renal cyst.  This is benign and does not require further workup or imaging.    Can follow-up as needed.         Hydronephrosis, unspecified hydronephrosis type  CT renal protocol confirmed no hydronephrosis, this is likely over read on ultrasound.  No further workup is needed.  Follow-up as needed.             Interval HPI:    She presents today for follow-up to review her CT renal protocol results.  As detailed below, CT renal protocol confirmed a Bosniak 2 right renal cyst.  Hydronephrosis noted on prior ultrasound was not present on CT suggesting likely over read.  Otherwise doing well.      History of Present Illness     Recently established patient initially seen by me on 2025 for evaluation of complex right renal cyst as well as hydronephrosis versus extrarenal pelvis. She had reported that her brother was recently diagnosed with sarcoidosis and had a lung biopsy.  This prompted her to review her MyChart records and she found that a CT of the abdomen and pelvis in 2016 had mentioned pleural plaque and at the time radiology had recommended a follow-up CT in 12 months.  This was never completed.  Of note CT imaging then had shown a low-density cyst in the upper pole of the right kidney measuring 1.9 x 2.0 cm. She spoke to her PCP regarding these findings which prompted a CT of the chest without contrast to be completed.  This showed a probable 1.5 cm cyst in the lower pole of the right kidney with a possible mural calcification or adjacent nonobstructing calculus.  Radiology recommended a renal ultrasound for further characterization. Renal ultrasound completed on 2025 showed a extrarenal pelvis versus mild right-sided hydronephrosis.   Right ureteral jet was present.  Mildly complex cyst in the upper pole of the right kidney with peripheral calcification measuring 2 cm.    She does report a history of kidney stones in the past no recent episodes.  She denies any issues with recurrent UTIs or gross hematuria. Negative smoking history, no chemical exposure history and no known family history of urinary tract malignancy.    I suspected that her right upper pole renal cyst was likely benign but given findings of questionable extrarenal pelvis versus mild hydronephrosis I did recommend a CT renal protocol which was completed on 2/24/2025.  This revealed a 2.4 cm right upper pole renal cyst with a small punctate peripheral calcification cyst wall.  Consistent with a Bosniak 2 renal cyst.  Almost certainly benign and not necessitating additional follow-up.  No hydronephrosis or hydroureter noted bilaterally.  She does have subcentimeter hypoattenuating renal lesions on the left too small to characterize and statistically likely benign.      Objective   There were no vitals taken for this visit.    Review of Systems   Constitutional:  Negative for chills and fever.   HENT:  Negative for ear pain and sore throat.    Eyes:  Negative for pain and visual disturbance.   Respiratory:  Negative for cough and shortness of breath.    Cardiovascular:  Negative for chest pain and palpitations.   Gastrointestinal:  Negative for abdominal pain and vomiting.   Genitourinary:  Negative for dysuria and hematuria.   Musculoskeletal:  Negative for arthralgias and back pain.   Skin:  Negative for color change and rash.   Neurological:  Negative for seizures and syncope.   All other systems reviewed and are negative.      Physical Exam  Vitals and nursing note reviewed.   Constitutional:       General: She is not in acute distress.     Appearance: She is well-developed.   HENT:      Head: Normocephalic and atraumatic.   Eyes:      Conjunctiva/sclera: Conjunctivae normal.    Cardiovascular:      Rate and Rhythm: Normal rate and regular rhythm.      Heart sounds: No murmur heard.  Pulmonary:      Effort: Pulmonary effort is normal. No respiratory distress.      Breath sounds: Normal breath sounds.   Abdominal:      Palpations: Abdomen is soft.      Tenderness: There is no abdominal tenderness.   Musculoskeletal:         General: No swelling.      Cervical back: Neck supple.   Skin:     General: Skin is warm and dry.      Capillary Refill: Capillary refill takes less than 2 seconds.   Neurological:      Mental Status: She is alert.   Psychiatric:         Mood and Affect: Mood normal.           Imagin2025    CT ABDOMEN AND PELVIS WITH AND WITHOUT IV CONTRAST     INDICATION: N28.1: Cyst of kidney, acquired  N13.30: Unspecified hydronephrosis.     COMPARISON: CT chest 2025     TECHNIQUE: Initial CT of the abdomen and pelvis was performed without intravenous contrast. Subsequent dynamic CT evaluation of the abdomen and pelvis was performed after the administration of contrast in both nephrographic and delayed phases.    Multiplanar 2D reformatted images were created from the source data.     This examination, like all CT scans performed in the [facility], was performed utilizing techniques to minimize radiation dose exposure, including the use of iterative reconstruction and automated exposure control. Radiation dose length   product (DLP) for this visit: 1327 mGy-cm     IV Contrast: 120 mL of iohexol  Enteric Contrast: Not administered.     FINDINGS:     ABDOMEN     RIGHT KIDNEY AND URETER:  No solid renal mass or detectable urothelial mass.  There is a 1.9 x 2.4 cm cyst identified in the posterolateral upper pole the right kidney corresponding to the lesion identified on recent CT chest dated 2025. Small punctate peripheral calcification in the cyst wall noted. No significant post   contrast-enhancement or other suspicious features noted. This is  consistent with a Bosniak 2 renal cyst, almost certainly benign and not necessitating additional follow-up.  No hydronephrosis or hydroureter.  No urinary tract calculi.  No perinephric collection.     LEFT KIDNEY AND URETER:  No solid renal mass or detectable urothelial mass.  Subcentimeter hypoattenuating renal lesion(s), too small to characterize but statistically likely benign, which do not warrant follow-up (Radiology June 2019).  No hydronephrosis or hydroureter.  No urinary tract calculi.  No perinephric collection.     URINARY BLADDER:  No bladder wall mass.  No calculi.        LOWER CHEST: No clinically significant abnormality in the visualized lower chest.     LIVER/BILIARY TREE: Unremarkable.     GALLBLADDER: No calcified gallstones. No pericholecystic inflammatory change.     SPLEEN: Unremarkable.     PANCREAS: Unremarkable.     ADRENAL GLANDS: Unremarkable.     STOMACH AND BOWEL: Unremarkable.     APPENDIX: Normal.     ABDOMINOPELVIC CAVITY: No ascites. No pneumoperitoneum. No lymphadenopathy. A few scattered mildly prominent mesenteric lymph nodes are likely reactive in nature.     VESSELS: Unremarkable for patient's age.     PELVIS     REPRODUCTIVE ORGANS: Unremarkable for patient's age.     ABDOMINAL WALL/INGUINAL REGIONS: Small fat-containing umbilical hernia.     BONES: No acute fracture or suspicious osseous lesion.     IMPRESSION:        1. Right renal cyst identified on recent CT chest 1/18/2025 corresponds to a 2.4 cm Bosniak 2 lesion, almost certainly benign and not necessitating additional follow-up.  2. Incidental findings as noted.          Please Note:  Voice dictation software has been used to create this document. There may be inadvertent transcriptions errors.     KATIA Salgado 04/08/25

## 2025-05-07 NOTE — ASSESSMENT & PLAN NOTE
- Per MFM, delivery is recommended between [id number]-[id number] due to no elevations in blood pressure since 20 weeks gestation Wegovy Patient Education  Savings Card  Follow this link to sign up for your Wegovy savings card. You will need this information when the specialty pharmacy contacts you to help pay for your prescription.  Wegovy Savings Card    Dosing Schedule      Choosing an Injection Site and Using your Pen       - Pens are stored in the refrigerator and can be removed 20-30 minutes before the injection to allow the medication to come to room temperature to avoid irritation, burning, or bruising with injection.   - Administration Video: Step-by-Step Instructions for Administering Wegovy    What to Expect      Rare, but Serious Side Effects  - Wegovy may cause pancreatitis or gallstones. If you experience severe abdominal pain that may radiate to the back and may or may not be accompanied by vomiting, you are encouraged to seek medical attention to assess your symptoms.     Reproduction, Pregnancy, and Lactation Considerations  - Wegovy is not recommended for use in patients who are currently pregnant or breastfeeding.   - If you plan to become pregnant, the use of this medication is not recommended at the time of conception. It is recommended that this medication should be discontinued at least 2 months prior to conception.     Patient's using Oral Contraceptives  - Use of medications like Wegovy may decrease the effectiveness of your oral hormonal contraceptives.   - You are encouraged to add a barrier method of contraception for 4 weeks after initiation and for 4 weeks after each dose titration of Wegovy to minimize this potential risk.     Missed or Changing Your Dosing Schedule  - If you miss a dose of Wegovy, take it as soon as possible, within 5 days of your scheduled dose. If more than 5 days have passed, skip the missed dose and take your next dose on your regularly scheduled day.  - If you would like to change the day of the week you take your Wegovy dose, make sure there are at least 2 days between doses.

## 2025-05-16 ENCOUNTER — OFFICE VISIT (OUTPATIENT)
Dept: FAMILY MEDICINE CLINIC | Facility: CLINIC | Age: 35
End: 2025-05-16
Payer: COMMERCIAL

## 2025-05-16 VITALS
OXYGEN SATURATION: 96 % | DIASTOLIC BLOOD PRESSURE: 80 MMHG | HEART RATE: 75 BPM | HEIGHT: 64 IN | SYSTOLIC BLOOD PRESSURE: 119 MMHG | RESPIRATION RATE: 18 BRPM | TEMPERATURE: 97.6 F | WEIGHT: 155.5 LBS | BODY MASS INDEX: 26.55 KG/M2

## 2025-05-16 DIAGNOSIS — Z00.00 ANNUAL PHYSICAL EXAM: Primary | ICD-10-CM

## 2025-05-16 DIAGNOSIS — F41.9 ANXIETY: ICD-10-CM

## 2025-05-16 PROCEDURE — 99395 PREV VISIT EST AGE 18-39: CPT

## 2025-05-16 NOTE — PROGRESS NOTES
Adult Annual Physical  Name: Min Medina      : 1990      MRN: [id number]  Encounter Provider: Jaz Bustamante MD  Encounter Date: 2025   Encounter department: [facility] [place]    :  Assessment & Plan  Anxiety    Orders:    sertraline (Zoloft) 50 mg tablet; Take 1 tablet (50 mg total) by mouth daily    Annual physical exam         Anxiety         Annual physical exam               Preventive Screenings:  - Diabetes Screening: screening up-to-date  - Cholesterol Screening: risks/benefits discussed   - Hepatitis C screening: screening up-to-date   - HIV screening: screening up-to-date   - Cervical cancer screening: risks/benefits discussed   - Lung cancer screening: screening not indicated     Immunizations:  - Immunizations due: Prevnar 20         History of Present Illness     Adult Annual Physical:  Patient presents for annual physical.     Diet and Physical Activity:  - Diet/Nutrition: well balanced diet.  - Exercise: no formal exercise.    General Health:  - Sleep: sleeps well.  - Hearing: normal hearing bilateral ears.  - Vision: no vision problems.  - Dental: regular dental visits.    Review of Systems   Constitutional:  Negative for activity change, chills and fever.   HENT:  Negative for congestion, ear pain, postnasal drip, rhinorrhea and sore throat.    Eyes:  Negative for pain and visual disturbance.   Respiratory:  Negative for cough and shortness of breath.    Cardiovascular:  Negative for chest pain and palpitations.   Gastrointestinal:  Negative for abdominal pain, constipation, diarrhea, nausea and vomiting.   Genitourinary:  Negative for dysuria and hematuria.   Musculoskeletal:  Negative for arthralgias and back pain.   Skin:  Negative for color change and rash.   Neurological:  Negative for seizures, syncope and headaches.   Psychiatric/Behavioral:  Negative for agitation, confusion and sleep disturbance. The patient is not nervous/anxious.   "  All other systems reviewed and are negative.    Medications Ordered Prior to Encounter[1]     Objective   /80 (BP Location: Left arm, Patient Position: Sitting, Cuff Size: Large)   Pulse 75   Temp 97.6 °F (36.4 °C) (Temporal)   Resp 18   Ht 5' 4\" (1.626 m)   Wt 70.5 kg (155 lb 8 oz)   LMP  (LMP Unknown)   SpO2 96%   BMI 26.69 kg/m²     Physical Exam  Vitals and nursing note reviewed.   Constitutional:       General: She is not in acute distress.     Appearance: Normal appearance. She is normal weight. She is not ill-appearing.   HENT:      Head: Normocephalic and atraumatic.      Right Ear: External ear normal.      Left Ear: External ear normal.      Nose: Nose normal.      Mouth/Throat:      Mouth: Mucous membranes are moist.      Pharynx: Oropharynx is clear.     Eyes:      Extraocular Movements: Extraocular movements intact.      Conjunctiva/sclera: Conjunctivae normal.       Cardiovascular:      Rate and Rhythm: Normal rate and regular rhythm.      Pulses: Normal pulses.      Heart sounds: Normal heart sounds. No murmur heard.     No gallop.   Pulmonary:      Effort: Pulmonary effort is normal. No respiratory distress.      Breath sounds: Normal breath sounds. No wheezing or rales.   Abdominal:      General: Abdomen is flat. Bowel sounds are normal. There is no distension.      Palpations: Abdomen is soft.      Tenderness: There is no abdominal tenderness.     Musculoskeletal:         General: No swelling or signs of injury. Normal range of motion.      Cervical back: Normal range of motion. No rigidity.     Skin:     General: Skin is warm and dry.      Findings: No erythema or rash.     Neurological:      General: No focal deficit present.      Mental Status: She is alert and oriented to person, place, and time.     Psychiatric:         Mood and Affect: Mood normal.         Behavior: Behavior normal.              [1]   No current outpatient medications on file prior to visit.     No current " facility-administered medications on file prior to visit.

## 2025-05-16 NOTE — PATIENT INSTRUCTIONS
"Patient Education     Routine physical for adults   The Basics   Written by the doctors and editors at [facility]   What is a physical? -- A physical is a routine visit, or \"check-up,\" with your doctor. You might also hear it called a \"wellness visit\" or \"preventive visit.\"  During each visit, the doctor will:   Ask about your physical and mental health   Ask about your habits, behaviors, and lifestyle   Do an exam   Give you vaccines if needed   Talk to you about any medicines you take   Give advice about your health   Answer your questions  Getting regular check-ups is an important part of taking care of your health. It can help your doctor find and treat any problems you have. But it's also important for preventing health problems.  A routine physical is different from a \"sick visit.\" A sick visit is when you see a doctor because of a health concern or problem. Since physicals are scheduled ahead of time, you can think about what you want to ask the doctor.  How often should I get a physical? -- It depends on your age and health. In general, for people age 21 years and older:   If you are younger than 50 years, you might be able to get a physical every 3 years.   If you are 50 years or older, your doctor might recommend a physical every year.  If you have an ongoing health condition, like diabetes or high blood pressure, your doctor will probably want to see you more often.  What happens during a physical? -- In general, each visit will include:   Physical exam - The doctor or nurse will check your height, weight, heart rate, and blood pressure. They will also look at your eyes and ears. They will ask about how you are feeling and whether you have any symptoms that bother you.   Medicines - It's a good idea to bring a list of all the medicines you take to each doctor visit. Your doctor will talk to you about your medicines and answer any questions. Tell them if you are having any side effects that bother you. You " "should also tell them if you are having trouble paying for any of your medicines.   Habits and behaviors - This includes:   Your diet   Your exercise habits   Whether you smoke, drink alcohol, or use drugs   Whether you are sexually active   Whether you feel safe at home  Your doctor will talk to you about things you can do to improve your health and lower your risk of health problems. They will also offer help and support. For example, if you want to quit smoking, they can give you advice and might prescribe medicines. If you want to improve your diet or get more physical activity, they can help you with this, too.   Lab tests, if needed - The tests you get will depend on your age and situation. For example, your doctor might want to check your:   Cholesterol   Blood sugar   Iron level   Vaccines - The recommended vaccines will depend on your age, health, and what vaccines you already had. Vaccines are very important because they can prevent certain serious or deadly infections.   Discussion of screening - \"Screening\" means checking for diseases or other health problems before they cause symptoms. Your doctor can recommend screening based on your age, risk, and preferences. This might include tests to check for:   Cancer, such as breast, prostate, cervical, ovarian, colorectal, prostate, lung, or skin cancer   Sexually transmitted infections, such as chlamydia and gonorrhea   Mental health conditions like depression and anxiety  Your doctor will talk to you about the different types of screening tests. They can help you decide which screenings to have. They can also explain what the results might mean.   Answering questions - The physical is a good time to ask the doctor or nurse questions about your health. If needed, they can refer you to other doctors or specialists, too.  Adults older than 65 years often need other care, too. As you get older, your doctor will talk to you about:   How to prevent falling at " home   Hearing or vision tests   Memory testing   How to take your medicines safely   Making sure that you have the help and support you need at home  All topics are updated as new evidence becomes available and our peer review process is complete.  This topic retrieved from [employer] on: May 02, 2024.  Topic 109160 Version 1.0  Release: 32.4.3 - C32.122  © 2024 UpToDate, Inc. and/or its affiliates. All rights reserved.  Consumer Information Use and Disclaimer   Disclaimer: This generalized information is a limited summary of diagnosis, treatment, and/or medication information. It is not meant to be comprehensive and should be used as a tool to help the user understand and/or assess potential diagnostic and treatment options. It does NOT include all information about conditions, treatments, medications, side effects, or risks that may apply to a specific patient. It is not intended to be medical advice or a substitute for the medical advice, diagnosis, or treatment of a health care provider based on the health care provider's examination and assessment of a patient's specific and unique circumstances. Patients must speak with a health care provider for complete information about their health, medical questions, and treatment options, including any risks or benefits regarding use of medications. This information does not endorse any treatments or medications as safe, effective, or approved for treating a specific patient. UpToDate, Inc. and its affiliates disclaim any warranty or liability relating to this information or the use thereof.The use of this information is governed by the Terms of Use, available at https://www.[employer]uwer.com/en/know/clinical-effectiveness-terms. 2024© UpToDate, Inc. and its affiliates and/or licensors. All rights reserved.  Copyright   © 2024 Up[employer]Date, Inc. and/or its affiliates. All rights reserved.

## 2025-08-16 ENCOUNTER — APPOINTMENT (OUTPATIENT)
Dept: LAB | Facility: HOSPITAL | Age: 35
End: 2025-08-16

## 2025-08-16 DIAGNOSIS — Z00.8 ENCOUNTER FOR OTHER GENERAL EXAMINATION: ICD-10-CM

## 2025-08-16 LAB
CHOLEST SERPL-MCNC: 160 MG/DL (ref ?–200)
EST. AVERAGE GLUCOSE BLD GHB EST-MCNC: 114 MG/DL
HBA1C MFR BLD: 5.6 %
HDLC SERPL-MCNC: 43 MG/DL
LDLC SERPL CALC-MCNC: 98 MG/DL (ref 0–100)
NONHDLC SERPL-MCNC: 117 MG/DL
TRIGL SERPL-MCNC: 97 MG/DL (ref ?–150)

## 2025-08-16 PROCEDURE — 83036 HEMOGLOBIN GLYCOSYLATED A1C: CPT

## 2025-08-16 PROCEDURE — 80061 LIPID PANEL: CPT

## 2025-08-16 PROCEDURE — 36415 COLL VENOUS BLD VENIPUNCTURE: CPT

## (undated) DEVICE — ADHESIVE SKIN HIGH VISCOSITY EXOFIN 1ML

## (undated) DEVICE — KIT,BETHLEHEM C SECT DELIVERY: Brand: CARDINAL HEALTH

## (undated) DEVICE — MEDI-VAC YANKAUER SUCTION HANDLE W/STRAIGHT TIP & CONTROL VENT: Brand: CARDINAL HEALTH

## (undated) DEVICE — SUT VICRYL 0 CT 36 IN J958H

## (undated) DEVICE — CHLORAPREP HI-LITE 26ML ORANGE

## (undated) DEVICE — GLOVE INDICATOR PI UNDERGLOVE SZ 7.5 BLUE

## (undated) DEVICE — GLOVE PI ULTRA TOUCH SZ.7.5

## (undated) DEVICE — SUT VICRYL 0 CTX 36 IN J978H

## (undated) DEVICE — SUT CHROMIC 0 CT-1 27 IN 812H

## (undated) DEVICE — SPONGE LAP 18 X 18 IN STRL RFD

## (undated) DEVICE — SUT MONOCRYL 4-0 PS-2 27 IN Y426H